# Patient Record
Sex: MALE | Race: WHITE | NOT HISPANIC OR LATINO | Employment: FULL TIME | URBAN - METROPOLITAN AREA
[De-identification: names, ages, dates, MRNs, and addresses within clinical notes are randomized per-mention and may not be internally consistent; named-entity substitution may affect disease eponyms.]

---

## 2017-06-07 ENCOUNTER — RADIANT APPOINTMENT (OUTPATIENT)
Dept: GENERAL RADIOLOGY | Facility: CLINIC | Age: 42
End: 2017-06-07
Attending: NURSE PRACTITIONER
Payer: COMMERCIAL

## 2017-06-07 ENCOUNTER — OFFICE VISIT (OUTPATIENT)
Dept: FAMILY MEDICINE | Facility: CLINIC | Age: 42
End: 2017-06-07
Payer: COMMERCIAL

## 2017-06-07 VITALS
HEART RATE: 72 BPM | TEMPERATURE: 98.3 F | DIASTOLIC BLOOD PRESSURE: 94 MMHG | WEIGHT: 315 LBS | SYSTOLIC BLOOD PRESSURE: 146 MMHG

## 2017-06-07 DIAGNOSIS — M25.562 ACUTE PAIN OF LEFT KNEE: Primary | ICD-10-CM

## 2017-06-07 PROCEDURE — 99203 OFFICE O/P NEW LOW 30 MIN: CPT | Performed by: NURSE PRACTITIONER

## 2017-06-07 PROCEDURE — 73565 X-RAY EXAM OF KNEES: CPT

## 2017-06-07 ASSESSMENT — PAIN SCALES - GENERAL: PAINLEVEL: NO PAIN (1)

## 2017-06-07 NOTE — LETTER
June 7, 2017      Rashaun Camara  [address] [facility] [address]      RE: Rashaun Rodney was seen in clinic today 6/7/2017 for acute injury of knee. He will be out of of work the rest of this week 6/7/2017 - 6/9/2017. May return to work on Monday 6/1212/2017.      Sincerely,    JARETT Collins CNP      Your [facility] Care Team

## 2017-06-07 NOTE — PATIENT INSTRUCTIONS
Will get x-ray today and notify you of any abnormal results    Will ace wrap knee today in office - if this does not offer enough compression/comfort - Get a compression sleeve/brace to see if tighter compression helps comfort    Ice knee as able     Ibuprofen 600 mg three times daily     Stay off of work this week, return to work Monday if you are able     Make appointment to see ortho   Knee Pain of Uncertain Cause    There are several common causes for knee pain. These can include:    A sprain of the ligaments that support the joint    An injury to the cartilage lining of the joint    Arthritis from wear-and-tear or inflammation  There are other causes as well. There may also be swelling, reduced movement of the knee joint, and pain with walking. A definite diagnosis will still need to be made. If your symptoms do not improve, further follow-up and testing may be needed.  Home care    Stay off the injured leg as much as possible until pain improves.    Apply an ice pack over the injured area for 15 to 20 minutes every 3 to 6 hours. You should do this for the first 24 to 48 hours. You can make an ice pack by filling a plastic bag that seals at the top with ice cubes and then wrapping it with a thin towel. Continue to use ice packs for relief of pain and swelling as needed. As the ice melts, be careful to avoid getting your wrap, splint, or cast wet. After 48 hours, apply heat (warm shower or warm bath) for 15 to 20 minutes several times a day, or alternate ice and heat. If you have to wear a hook-and-loop knee brace, you can open it to apply the ice pack, or heat, directly to the knee. Never put ice directly on the skin. Always wrap the ice in a towel or other type of cloth.    You may use over-the-counter pain medicine to control pain, unless another pain medicine was prescribed. If you have chronic liver or kidney disease or ever had a stomach ulcer or GI bleeding, talk with your healthcare provider using these  medicines.    If crutches or a walker have been recommended, do not put weight on the injured leg until you can do so without pain. Check with your healthcare provider before returning to sports or full work duties.    If you have a hook-and-loop knee brace, you can remove it to bathe and sleep, unless told otherwise.  Follow-up care  Follow up with your healthcare provider as advised. This is usually within 1-2 weeks.  If X-rays were taken, you will be told of any new findings that may affect your care.  Call 911  Call 911 if you have:     Shortness of breath    Chest pain  When to seek medical advice  Call your healthcare provider right away if any of these occur:    Toes or foot becomes swollen, cold, blue, numb, or tingly    Pain or swelling spreads over the knee or calf    Warmth or redness appears over the knee or calf    Other joints become painful    Rash appears    Fever of 100.4 F (38 C) or above lasting for 24 to 48 hours    8120-6171 [employer]. [address] [place] [address], [place] [address]. All rights reserved. This information is not intended as a substitute for professional medical care. Always follow your healthcare professional's instructions.

## 2017-06-07 NOTE — MR AVS SNAPSHOT
After Visit Summary   6/7/2017    Rashaun Camara    MRN: [id number]           Patient Information     Date Of Birth          1975        Visit Information        Provider Department      6/7/2017 9:40 AM Talya Bates APRN [facility]        Today's Diagnoses     Acute pain of left knee    -  1      Care Instructions    Will get x-ray today and notify you of any abnormal results    Will ace wrap knee today in office - if this does not offer enough compression/comfort - Get a compression sleeve/brace to see if tighter compression helps comfort    Ice knee as able     Ibuprofen 600 mg three times daily     Stay off of work this week, return to work Monday if you are able     Make appointment to see ortho   Knee Pain of Uncertain Cause    There are several common causes for knee pain. These can include:    A sprain of the ligaments that support the joint    An injury to the cartilage lining of the joint    Arthritis from wear-and-tear or inflammation  There are other causes as well. There may also be swelling, reduced movement of the knee joint, and pain with walking. A definite diagnosis will still need to be made. If your symptoms do not improve, further follow-up and testing may be needed.  Home care    Stay off the injured leg as much as possible until pain improves.    Apply an ice pack over the injured area for 15 to 20 minutes every 3 to 6 hours. You should do this for the first 24 to 48 hours. You can make an ice pack by filling a plastic bag that seals at the top with ice cubes and then wrapping it with a thin towel. Continue to use ice packs for relief of pain and swelling as needed. As the ice melts, be careful to avoid getting your wrap, splint, or cast wet. After 48 hours, apply heat (warm shower or warm bath) for 15 to 20 minutes several times a day, or alternate ice and heat. If you have to wear a hook-and-loop knee brace, you can open it to apply the ice  pack, or heat, directly to the knee. Never put ice directly on the skin. Always wrap the ice in a towel or other type of cloth.    You may use over-the-counter pain medicine to control pain, unless another pain medicine was prescribed. If you have chronic liver or kidney disease or ever had a stomach ulcer or GI bleeding, talk with your healthcare provider using these medicines.    If crutches or a walker have been recommended, do not put weight on the injured leg until you can do so without pain. Check with your healthcare provider before returning to sports or full work duties.    If you have a hook-and-loop knee brace, you can remove it to bathe and sleep, unless told otherwise.  Follow-up care  Follow up with your healthcare provider as advised. This is usually within 1-2 weeks.  If X-rays were taken, you will be told of any new findings that may affect your care.  Call 911  Call 911 if you have:     Shortness of breath    Chest pain  When to seek medical advice  Call your healthcare provider right away if any of these occur:    Toes or foot becomes swollen, cold, blue, numb, or tingly    Pain or swelling spreads over the knee or calf    Warmth or redness appears over the knee or calf    Other joints become painful    Rash appears    Fever of 100.4 F (38 C) or above lasting for 24 to 48 hours    7117-6639 [employer]. [address] [place], [place] [address]. All rights reserved. This information is not intended as a substitute for professional medical care. Always follow your healthcare professional's instructions.                Follow-ups after your visit        Additional Services     ORTHO  REFERRAL       [facility] is referring you to the Orthopedic  Services at [facility].       The  Representative will assist you in the coordination of your Orthopedic and Musculoskeletal Care as prescribed by your physician.    The   "Representative will call you within 1 business day to help schedule your appointment, or you may contact the  Representative at:    All areas ~ [phone]     Type of Referral : Non Surgical       Timeframe requested: Within 2 weeks    Coverage of these services is subject to the terms and limitations of your health insurance plan.  Please call member services at your health plan with any benefit or coverage questions.      If X-rays, CT or MRI's have been performed, please contact the facility where they were done to arrange for , prior to your scheduled appointment.  Please bring this referral request to your appointment and present it to your specialist.                  Who to contact     If you have questions or need follow up information about today's clinic visit or your schedule please contact [facility] directly at [phone].  Normal or non-critical lab and imaging results will be communicated to you by [employer], letter or phone within 4 business days after the clinic has received the results. If you do not hear from us within 7 days, please contact the clinic through [employer] or phone. If you have a critical or abnormal lab result, we will notify you by phone as soon as possible.  Submit refill requests through [employer] or call your pharmacy and they will forward the refill request to us. Please allow 3 business days for your refill to be completed.          Additional Information About Your Visit        [employer] Information     [employer] lets you send messages to your doctor, view your test results, renew your prescriptions, schedule appointments and more. To sign up, go to [email or web address] . Click on \"Log in\" on the left side of the screen, which will take you to the Welcome page. Then click on \"Sign up Now\" on the right side of the page.     You will be asked to enter the access code listed below, as well as some personal information. Please follow the " directions to create your username and password.     Your access code is: [email or web address]  Expires: 2017 10:26 AM     Your access code will  in 90 days. If you need help or a new code, please call your [facility] or [phone].        Care EveryWhere ID     This is your Care EveryWhere ID. This could be used by other organizations to access your [place] medical records  [id number]        Your Vitals Were     Pulse Temperature                72 98.3  F (36.8  C) (Tympanic)           Blood Pressure from Last 3 Encounters:   17 (!) 146/94   09/15/16 132/72    Weight from Last 3 Encounters:   17 (!) 366 lb (166 kg)              We Performed the Following     ORTHO  REFERRAL     XR Knee AP Standing Bilateral        Primary Care Provider    None       No address on file        Thank you!     Thank you for choosing [facility]  for your care. Our goal is always to provide you with excellent care. Hearing back from our patients is one way we can continue to improve our services. Please take a few minutes to complete the written survey that you may receive in the mail after your visit with us. Thank you!             Your Updated Medication List - Protect others around you: Learn how to safely use, store and throw away your medicines at www.disposemymeds.org.          This list is accurate as of: 17 10:26 AM.  Always use your most recent med list.                   Brand Name Dispense Instructions for use    FLONASE NA      Spray in nostril as needed

## 2017-06-07 NOTE — NURSING NOTE
Chief Complaint   Patient presents with     Musculoskeletal Problem     Knee       Initial BP (!) 146/94 (BP Location: Right arm, Patient Position: Chair, Cuff Size: Adult Large)  Pulse 72  Temp 98.3  F (36.8  C) (Tympanic)  Wt (!) 366 lb (166 kg) There is no height or weight on file to calculate BMI.  Medication Reconciliation: complete    Health Maintenance that is potentially due pending provider review:  TD - Believes had within the last 3 years possibly at [facility]    Alison Edwards MA  9:57 AM 6/7/2017  .

## 2017-06-07 NOTE — PROGRESS NOTES
"  SUBJECTIVE:                                                    Rashaun Camara is a 41 year old male who presents to clinic today for the following health issues:      Knee Pain     Onset: x 1 week    Description:   Location: left knee  Character: \"electric\" when moves just right, Dull Ache at times    Intensity: moderate    Progression of Symptoms: worse    Accompanying Signs & Symptoms:  Other symptoms: swelling and spasms and cramps in his calf   History:   Previous similar pain: no       Precipitating factors:   Trauma or overuse: Unknown    Alleviating factors:  Improved by: ice and NSAID - Ibuprofen    Sometimes will buckle on him if he steps wrong     Therapies Tried and outcome: Ibuprofen, Ice - fells some relief      No history of trauma or injury  Slipped and fell when mowing the lawn, slipped in a pot hole prior to all of this, but didn't hurt during/after incidents    Steadily getting worse in the pat week     Morning is okay, stiff and wants to buckle  Walks it out, then by mid-end of day, by about noon starts hurting more  Doesn't want to support him to move, some times has to catch self    Time off it seems to improve    Ibuprofen seems to help, but is resting when taking so not sure if is really helping     At rest doesn't even feel, when tapping on medial aspect of knee is like electric  Spasms of lower calf and upper leg  Waking up with cramps         Problem list and histories reviewed & adjusted, as indicated.  Additional history: as documented    There is no problem list on file for this patient.    History reviewed. No pertinent surgical history.    Social History   Substance Use Topics     Smoking status: Never Smoker     Smokeless tobacco: Not on file     Alcohol use No     History reviewed. No pertinent family history.      Current Outpatient Prescriptions   Medication Sig Dispense Refill     Fluticasone Propionate (FLONASE NA) Spray in nostril as needed       Allergies   Allergen Reactions "     Penicillins Rash       Reviewed and updated as needed this visit by clinical staff  Tobacco  Allergies  Meds  Problems  Med Hx  Surg Hx  Fam Hx  Soc Hx        Reviewed and updated as needed this visit by Provider  Allergies  Meds  Problems         ROS:  Constitutional, HEENT, cardiovascular, pulmonary, gi and gu systems are negative, except as otherwise noted.    OBJECTIVE:                                                    BP (!) 146/94 (BP Location: Right arm, Patient Position: Chair, Cuff Size: Adult Large)  Pulse 72  Temp 98.3  F (36.8  C) (Tympanic)  Wt (!) 366 lb (166 kg)  There is no height or weight on file to calculate BMI.  GENERAL APPEARANCE: healthy, alert and no distress  MS: extremities normal- no gross deformities noted and peripheral pulses normal, antalgic gait favoring left side  ORTHO: Knee Exam Left: Inspection: No effusion  Tender: medial joint line  Non-tender: remainder of knee non-tender  Active Range of Motion: full flexion, pain with extension  Strength: quad  5/5, Hamstrings  5/5, Gastroc  5/5, Tibialis anterior  5/5 and Peroneals  5/5    Also examined: hip full range of motion   NEURO: Normal strength and tone, mentation intact, speech normal and DTR symmetrically normal in lower extremities    Diagnostic Test Results:  Xray - Knees independently read by JARETT Collins CNP - good joint space, no acute fracture      ASSESSMENT/PLAN:                                                      1. Acute pain of left knee  Just started hurting x 1 week, prior to that no known injury or trauma. Patient did slip in yard when mowing and stepped in pot hole, but did not have pain with either at time of incident. Full range of motion with the exception of full extension with pain. No effusion. Xray negative for degeneration or acute process. Concerned for ligament tear would like patient to see ortho for further evaluation  - XR Knee AP Standing Bilateral  - ORTHO   REFERRAL    Patient Instructions   Will get x-ray today and notify you of any abnormal results    Will ace wrap knee today in office - if this does not offer enough compression/comfort - Get a compression sleeve/brace to see if tighter compression helps comfort    Ice knee as able     Ibuprofen 600 mg three times daily     Stay off of work this week, return to work Monday if you are able     Make appointment to see ortho   Knee Pain of Uncertain Cause    There are several common causes for knee pain. These can include:    A sprain of the ligaments that support the joint    An injury to the cartilage lining of the joint    Arthritis from wear-and-tear or inflammation  There are other causes as well. There may also be swelling, reduced movement of the knee joint, and pain with walking. A definite diagnosis will still need to be made. If your symptoms do not improve, further follow-up and testing may be needed.  Home care    Stay off the injured leg as much as possible until pain improves.    Apply an ice pack over the injured area for 15 to 20 minutes every 3 to 6 hours. You should do this for the first 24 to 48 hours. You can make an ice pack by filling a plastic bag that seals at the top with ice cubes and then wrapping it with a thin towel. Continue to use ice packs for relief of pain and swelling as needed. As the ice melts, be careful to avoid getting your wrap, splint, or cast wet. After 48 hours, apply heat (warm shower or warm bath) for 15 to 20 minutes several times a day, or alternate ice and heat. If you have to wear a hook-and-loop knee brace, you can open it to apply the ice pack, or heat, directly to the knee. Never put ice directly on the skin. Always wrap the ice in a towel or other type of cloth.    You may use over-the-counter pain medicine to control pain, unless another pain medicine was prescribed. If you have chronic liver or kidney disease or ever had a stomach ulcer or GI bleeding, talk with  your healthcare provider using these medicines.    If crutches or a walker have been recommended, do not put weight on the injured leg until you can do so without pain. Check with your healthcare provider before returning to sports or full work duties.    If you have a hook-and-loop knee brace, you can remove it to bathe and sleep, unless told otherwise.  Follow-up care  Follow up with your healthcare provider as advised. This is usually within 1-2 weeks.  If X-rays were taken, you will be told of any new findings that may affect your care.  Call 911  Call 911 if you have:     Shortness of breath    Chest pain  When to seek medical advice  Call your healthcare provider right away if any of these occur:    Toes or foot becomes swollen, cold, blue, numb, or tingly    Pain or swelling spreads over the knee or calf    Warmth or redness appears over the knee or calf    Other joints become painful    Rash appears    Fever of 100.4 F (38 C) or above lasting for 24 to 48 hours    7942-3489 [employer] [address] [place] [address]. All rights reserved. This information is not intended as a substitute for professional medical care. Always follow your healthcare professional's instructions.            JARETT Choudhary [facility]

## 2017-06-13 ENCOUNTER — OFFICE VISIT (OUTPATIENT)
Dept: ORTHOPEDICS | Facility: CLINIC | Age: 42
End: 2017-06-13
Payer: COMMERCIAL

## 2017-06-13 VITALS
WEIGHT: 315 LBS | BODY MASS INDEX: 37.19 KG/M2 | HEIGHT: 77 IN | DIASTOLIC BLOOD PRESSURE: 92 MMHG | SYSTOLIC BLOOD PRESSURE: 148 MMHG

## 2017-06-13 DIAGNOSIS — M25.462 EFFUSION OF LEFT KNEE: ICD-10-CM

## 2017-06-13 DIAGNOSIS — M25.562 ACUTE PAIN OF LEFT KNEE: Primary | ICD-10-CM

## 2017-06-13 PROCEDURE — 99243 OFF/OP CNSLTJ NEW/EST LOW 30: CPT | Performed by: FAMILY MEDICINE

## 2017-06-13 NOTE — PROGRESS NOTES
"Rashaun Camara  :  1975  DOS: 2017  MRN: [id number]    Sports Medicine Clinic Visit    PCP: None    Rashaun Camara is a 41 year old male who is seen in consultation at the request of  Talya Bates C.N.P. presenting with acute left knee pain.    Injury: Mowing lawn, slipped landing with left knee bent behind him ~ 2.5 weeks ago, pain started ~ 3 days later and has gradually worsened over time.  Pain located over left deep medial knee, nonradiating.  Additional Features:  Positive: swelling, popping, instability and weakness, severe antalgic gait.  Symptoms are better with Rest and knee brace.  Symptoms are worse with: walking, lying on either side, stairs/step ups, going from sit to stand.  Other evaluation and/or treatments so far consists of: Ice, Ibuprofen, Rest and UC visit, hinged knee brace.  Recent imaging completed: X-rays completed 17.  Prior History of related problems: none  Patient reports that he mild relief after resting from work for ~ 5 days, pain worsened after returning to work yesterday.    Social History: works a  for [employer]    Review of Systems  Musculoskeletal: as above  Remainder of review of systems is negative including constitutional, CV, pulmonary, GI, Skin and Neurologic except as noted in HPI or medical history.    No past medical history on file.  No past surgical history on file.    Objective  BP (!) 148/92  Ht 6' 5\" (1.956 m)  Wt (!) 366 lb (166 kg)  BMI 43.4 kg/m2    General: healthy, alert and in no distress    HEENT: no scleral icterus or conjunctival erythema   Skin: no suspicious lesions or rash. No jaundice.   CV: regular rhythm by palpation, 2+ distal pulses, no pedal edema    Resp: normal respiratory effort without conversational dyspnea   Psych: normal mood and affect    Gait: antalgic, appropriate coordination and balance   Neuro: normal light touch sensory exam of the extremities. Motor strength as noted below "     Left Knee exam    ROM:        Flexion 90 degrees       Extension -6 degrees       Range of motion limited by pain, tightness    Inspection:       no visible ecchymosis        effusion noted small/moderte    Skin:       no visible deformities       well perfused       capillary refill brisk    Patellar Motion:        Normal patellar tracking noted through range of motion       Crepitus noted in the patellofemoral joint    Tender:        lateral patellar border       medial joint line       lateral joint line    Non Tender:         remainder of knee area        medial patellar border        along MCL        distal IT Band        infrapatellar tendon       pes anserine bursa       either hamstring tendon    Special Tests:        positive (+) Noah       neg (-) Lachman       neg (-) anterior drawer       neg (-) posterior drawer       neg (-) varus at 0 deg and 30 deg       neg (-) valgus at 0 deg and 30 deg       positive pain with forced extension    Evaluation of ipsilateral kinetic chain       normal strength with hip extension and abduction       normal strength with single leg squat      Radiology  Results for orders placed or performed in visit on 06/07/17   XR Knee AP Standing Bilateral    Narrative    XR KNEE AP STANDING BILATERAL -  6/7/2017 10:55 AM     HISTORY:  Left knee pain, Pain in left knee.     COMPARISON: None.      Impression    IMPRESSION: Minimal degenerative changes in the right medial and  lateral compartments. Joint spaces of the left knee are maintained. No  fracture or osseous lesion seen on this single view.     ALISE BRADSHAW MD         Assessment:  1. Acute pain of left knee    2. Effusion of left knee        Plan:  Discussed the assessment with the patient.  Follow up: will contact with MR results  Given nature/mechanism of injury and effusion with significant WB pain, will eval with MR to determine if there is a fracture or internal dernagement  Advised protected painfree WB  He  is reticent to take too many restrictions at work  I filled out paperwork for him with limited standing and WB in leg, but will work with him as long as his job duties can be perfored safel for him and others and not worsen underlying injury  RICE reviewed  XR images independently visualized and reviewed with patient today in clinic  Minimal OA changes on imaging, signs of small effusion  Assistive device use revewed  Plan for close f/u, unless there is a derangement which would require ortho consult  Home handouts provided and supportive care reviewed  All questions were answered today  Contact us with additional questions or concerns  Signs and sx of concern reviewed    Thanks very much for sending this nice gentleman to us, I will keep you updated with his progress      Tristan Monroy DO, CAQ  Primary Care Sports Medicine  [facility]               Disclaimer: This note consists of symbols derived from keyboarding, dictation and/or voice recognition software. As a result, there may be errors in the script that have gone undetected. Please consider this when interpreting information found in this chart.

## 2017-06-13 NOTE — MR AVS SNAPSHOT
After Visit Summary   6/13/2017    Rashaun Camara    MRN: [id number]           Patient Information     Date Of Birth          1975        Visit Information        Provider Department      6/13/2017 3:00 PM Tristan Monroy,  [facility]        Today's Diagnoses     Acute pain of left knee    -  1    Effusion of left knee          Care Instructions     Advanced imaging is done by appointment. Some insurance require a prior authorization to be completed which may delay the time until you are able to schedule your appointment. You should be receiving a call from the scheduling department, if you have not heard from them in 24-48 hours.   Please call [facility]: [phone] to schedule your left knee MRI.  Depending on your availability you can usually schedule within the next 1-2 days.    The clinic will call you with results, if you have not heard from the clinic within 3-4 days following your MRI please contact us at the number listed below.                 Follow-ups after your visit        Future tests that were ordered for you today     Open Future Orders        Priority Expected Expires Ordered    MR Knee Left w/o Contrast Routine  6/13/2018 6/13/2017            Who to contact     If you have questions or need follow up information about today's clinic visit or your schedule please contact [facility] directly at [phone].  Normal or non-critical lab and imaging results will be communicated to you by MyChart, letter or phone within 4 business days after the clinic has received the results. If you do not hear from us within 7 days, please contact the clinic through MyChart or phone. If you have a critical or abnormal lab result, we will notify you by phone as soon as possible.  Submit refill requests through [employer] or call your pharmacy and they will forward the refill request to us. Please  "allow 3 business days for your refill to be completed.          Additional Information About Your Visit        MyChart Information     Appwizhart lets you send messages to your doctor, view your test results, renew your prescriptions, schedule appointments and more. To sign up, go to [email or web address] . Click on \"Log in\" on the left side of the screen, which will take you to the Welcome page. Then click on \"Sign up Now\" on the right side of the page.     You will be asked to enter the access code listed below, as well as some personal information. Please follow the directions to create your username and password.     Your access code is: [email or web address]  Expires: 2017 10:26 AM     Your access code will  in 90 days. If you need help or a new code, please call your [place] clinic or [phone].        Care EveryWhere ID     This is your Care EveryWhere ID. This could be used by other organizations to access your [place] medical records  [id number]        Your Vitals Were     Height BMI (Body Mass Index)                6' 5\" (1.956 m) 43.4 kg/m2           Blood Pressure from Last 3 Encounters:   17 (!) 148/92   17 (!) 146/94   09/15/16 132/72    Weight from Last 3 Encounters:   17 (!) 366 lb (166 kg)   17 (!) 366 lb (166 kg)               Primary Care Provider    None       No address on file        Thank you!     Thank you for choosing [facility]  for your care. Our goal is always to provide you with excellent care. Hearing back from our patients is one way we can continue to improve our services. Please take a few minutes to complete the written survey that you may receive in the mail after your visit with us. Thank you!             Your Updated Medication List - Protect others around you: Learn how to safely use, store and throw away your medicines at www.disposemymeds.org.          This list is accurate as of: 17  3:48 PM.  Always use " your most recent med list.                   Brand Name Dispense Instructions for use    FLONASE NA      Spray in nostril as needed

## 2017-06-13 NOTE — NURSING NOTE
"Chief Complaint   Patient presents with     Knee Pain     acute left knee pain > 2 weeks       Initial BP (!) 148/92  Ht 6' 5\" (1.956 m)  Wt (!) 366 lb (166 kg)  BMI 43.4 kg/m2 Estimated body mass index is 43.4 kg/(m^2) as calculated from the following:    Height as of this encounter: 6' 5\" (1.956 m).    Weight as of this encounter: 366 lb (166 kg).  Medication Reconciliation: complete     Joe Torres ATC  "

## 2017-06-13 NOTE — PATIENT INSTRUCTIONS
Advanced imaging is done by appointment. Some insurance require a prior authorization to be completed which may delay the time until you are able to schedule your appointment. You should be receiving a call from the scheduling department, if you have not heard from them in 24-48 hours.   Please call [place] [place] [facility] [place]: [phone] to schedule your left knee MRI.  Depending on your availability you can usually schedule within the next 1-2 days.    The clinic will call you with results, if you have not heard from the clinic within 3-4 days following your MRI please contact us at the number listed below.

## 2017-06-14 ENCOUNTER — HOSPITAL ENCOUNTER (OUTPATIENT)
Dept: MRI IMAGING | Facility: CLINIC | Age: 42
Discharge: HOME | End: 2017-06-14
Attending: FAMILY MEDICINE | Admitting: FAMILY MEDICINE
Payer: COMMERCIAL

## 2017-06-14 DIAGNOSIS — M25.462 EFFUSION OF LEFT KNEE: ICD-10-CM

## 2017-06-14 DIAGNOSIS — M25.562 ACUTE PAIN OF LEFT KNEE: ICD-10-CM

## 2017-06-14 PROCEDURE — 73721 MRI JNT OF LWR EXTRE W/O DYE: CPT | Mod: LT

## 2017-06-15 ENCOUNTER — TELEPHONE (OUTPATIENT)
Dept: ORTHOPEDICS | Facility: CLINIC | Age: 42
End: 2017-06-15

## 2017-06-15 NOTE — TELEPHONE ENCOUNTER
Patient scheduled for f/u appt on 6/22.  Letter emailed to patient as requested.    Joe Torres ATC

## 2017-06-15 NOTE — LETTER
Olivia 15, 2017      Rashaun [facility]  [address] [facility] [address]        I reviewed Rashaun's MRI results with him today.  I am recommending the same restrictions as provided earlier this week for now, but I will follow up with him next week and updated restrictions will be provided at that time.  The most critical issue in managing his recovery will be avoiding pain when he is bearing weight.  I strongly advised him to limit, modify or eliminate any painful weightbearing activity.  All home or work activity that is pain-free is allowed, within the provided restrictions.  Please work with him as much as possible to provide work for him if possible within his restrictions.        Tristan Hanna, , CAQ  Primary Care Sports Medicine  [facility]

## 2017-06-15 NOTE — TELEPHONE ENCOUNTER
Reason for Call:  Request for results:    Name of test or procedure: MRI    Date of test of procedure: 6/14/17    Location of the test or procedure: [place]    OK to leave the result message on voice mail or with a family member? YES    Phone number Patient can be reached at:  Home number on file [phone] (home)    Additional comments: pt calling stating someone called him and he is returning the call for MRI results    Call taken on 6/15/2017 at 9:13 AM by Shara Gómez

## 2017-06-15 NOTE — TELEPHONE ENCOUNTER
Returned phone call to patient and discussed results.  He has a moderate bone contusion/stress reaction on the distal end of his femur.  Would recommend that the limit WB on his left knee over the next 2 weeks.  Discussed his current work restrictions - he requesting that note be addended to allow him 2 - 4 hours of standing through out the day or clarification on total time vs time at one period.  Would also like to discuss bending restriction.  Advised may recommend further time off, although he is eager to continue working d/t limited PTO.  Will discuss with Dr Monroy and return phone call to patient.    Joe Torres ATC

## 2017-06-15 NOTE — TELEPHONE ENCOUNTER
New letter to be written, limit any painful WB over the next 2 weeks as described.  Will f/u with him next Thursday at 9am to revisit, will advance activity as tolerated  Reassuring no internal derangement  Ok to to to work on ROM and pain in a pool if painfree  New letter written and will be emailed to [email or web address]

## 2017-06-22 ENCOUNTER — OFFICE VISIT (OUTPATIENT)
Dept: ORTHOPEDICS | Facility: CLINIC | Age: 42
End: 2017-06-22
Payer: COMMERCIAL

## 2017-06-22 VITALS
DIASTOLIC BLOOD PRESSURE: 85 MMHG | SYSTOLIC BLOOD PRESSURE: 140 MMHG | BODY MASS INDEX: 37.19 KG/M2 | WEIGHT: 315 LBS | HEIGHT: 77 IN

## 2017-06-22 DIAGNOSIS — S89.92XD KNEE JOINT INJURY, LEFT, SUBSEQUENT ENCOUNTER: Primary | ICD-10-CM

## 2017-06-22 DIAGNOSIS — T14.90XA BONE INJURY: ICD-10-CM

## 2017-06-22 DIAGNOSIS — R93.7 BONE MARROW EDEMA: ICD-10-CM

## 2017-06-22 PROCEDURE — 99213 OFFICE O/P EST LOW 20 MIN: CPT | Performed by: FAMILY MEDICINE

## 2017-06-22 NOTE — PROGRESS NOTES
"Rashaun Camara  :  1975  DOS: 2017  MRN: [id number]    Sports Medicine Clinic Visit    PCP: None    Rashaun Camara is a 41 year old male who is seen in consultation at the request of  Talya Bates C.N.P. presenting with acute left knee pain.    Injury: Mowing lawn, slipped landing with left knee bent behind him ~ 2.5 weeks ago, pain started ~ 3 days later and has gradually worsened over time.  Pain located over left deep medial knee, nonradiating.  Additional Features:  Positive: swelling, popping, instability and weakness, severe antalgic gait.  Symptoms are better with Rest and knee brace.  Symptoms are worse with: walking, lying on either side, stairs/step ups, going from sit to stand.  Other evaluation and/or treatments so far consists of: Ice, Ibuprofen, Rest and UC visit, hinged knee brace.  Recent imaging completed: X-rays completed 17.  Prior History of related problems: none  Patient reports that he mild relief after resting from work for ~ 5 days, pain worsened after returning to work yesterday.    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  Reports that his pain has waxed and waned over the last 1 week.  Walking with moderate antalgic gait today after tripping over a bottle hans earlier this AM.  No interim injury.         Review of Systems  Musculoskeletal: as above  Remainder of review of systems is negative including constitutional, CV, pulmonary, GI, Skin and Neurologic except as noted in HPI or medical history.    No past medical history on file.  No past surgical history on file.    Objective  /85  Ht 6' 5\" (1.956 m)  Wt (!) 366 lb (166 kg)  BMI 43.4 kg/m2    General: healthy, alert and in no distress    HEENT: no scleral icterus or conjunctival erythema   Skin: no suspicious lesions or rash. No jaundice.   CV: regular rhythm by palpation, " 2+ distal pulses, no pedal edema    Resp: normal respiratory effort without conversational dyspnea   Psych: normal mood and affect    Gait: antalgic, appropriate coordination and balance   Neuro: normal light touch sensory exam of the extremities. Motor strength as noted below     Left Knee exam    ROM:        Flexion 130 degrees       Extension -2 degrees       Range of motion limited by mild pain in terminal flexion    Inspection:       no visible ecchymosis        effusion noted trace    Skin:       no visible deformities       well perfused       capillary refill brisk    Patellar Motion:        Normal patellar tracking noted through range of motion    Tender:        lateral patellar border mild       medial joint line and medial femoral condyle still focal       lateral joint line mild    Non Tender:         remainder of knee area        medial patellar border        along MCL        distal IT Band        infrapatellar tendon       pes anserine bursa       either hamstring tendon    Special Tests:        neg (-) varus at 0 deg and 30 deg       neg (-) valgus at 0 deg and 30 deg       positive pain with forced extension    Evaluation of ipsilateral kinetic chain       normal strength with hip extension and abduction      Radiology  Results for orders placed or performed in visit on 06/07/17   XR Knee AP Standing Bilateral    Narrative    XR KNEE AP STANDING BILATERAL -  6/7/2017 10:55 AM     HISTORY:  Left knee pain, Pain in left knee.     COMPARISON: None.      Impression    IMPRESSION: Minimal degenerative changes in the right medial and  lateral compartments. Joint spaces of the left knee are maintained. No  fracture or osseous lesion seen on this single view.     ALISE BRADSHAW MD         Assessment:  1. Knee joint injury, left, subsequent encounter    2. Bone injury    3. Bone marrow edema        Plan:  Discussed the assessment with the patient.  Follow up: 3 weeks  Continue protected painfree WB  Was  improving to pain-free WB except stairs, somewhat flared last 2 days due to increased activity  Letter updated today, can amend any time  He will call with update next week  Employer's position is that he likely will not be able to return until he has no restrictions  RICE reviewed, assistive device use reviewed including crutch/es and cane prn  Reassuring no internal derangement, so should heal completely with time and conservative care  Supportive care reviewed  All questions were answered today  Contact us with additional questions or concerns  Signs and sx of concern reviewed      Tristan Monroy DO, ASHLYN  Primary Care Sports Medicine  [facility]               Disclaimer: This note consists of symbols derived from keyboarding, dictation and/or voice recognition software. As a result, there may be errors in the script that have gone undetected. Please consider this when interpreting information found in this chart.

## 2017-06-22 NOTE — MR AVS SNAPSHOT
"              After Visit Summary   2017    Rashaun Camara    MRN: [id number]           Patient Information     Date Of Birth          1975        Visit Information        Provider Department      2017 9:00 AM Tristan Monroy,  [facility]        Today's Diagnoses     Knee joint injury, left, subsequent encounter    -  1    Bone injury        Bone marrow edema           Follow-ups after your visit        Who to contact     If you have questions or need follow up information about today's clinic visit or your schedule please contact [facility] directly at [phone].  Normal or non-critical lab and imaging results will be communicated to you by [employer], letter or phone within 4 business days after the clinic has received the results. If you do not hear from us within 7 days, please contact the clinic through [employer] or phone. If you have a critical or abnormal lab result, we will notify you by phone as soon as possible.  Submit refill requests through [employer] or call your pharmacy and they will forward the refill request to us. Please allow 3 business days for your refill to be completed.          Additional Information About Your Visit        MyChart Information     [employer] lets you send messages to your doctor, view your test results, renew your prescriptions, schedule appointments and more. To sign up, go to [email or web address] . Click on \"Log in\" on the left side of the screen, which will take you to the Welcome page. Then click on \"Sign up Now\" on the right side of the page.     You will be asked to enter the access code listed below, as well as some personal information. Please follow the directions to create your username and password.     Your access code is: [email or web address]  Expires: 2017 10:26 AM     Your access code will  in 90 days. If you need help or a new code, please call your [place] clinic or " "[phone].        Care EveryWhere ID     This is your Care EveryWhere ID. This could be used by other organizations to access your [place] medical records  [id number]        Your Vitals Were     Height BMI (Body Mass Index)                6' 5\" (1.956 m) 43.4 kg/m2           Blood Pressure from Last 3 Encounters:   06/22/17 140/85   06/13/17 (!) 148/92   06/07/17 (!) 146/94    Weight from Last 3 Encounters:   06/22/17 (!) 366 lb (166 kg)   06/13/17 (!) 366 lb (166 kg)   06/07/17 (!) 366 lb (166 kg)              Today, you had the following     No orders found for display       Primary Care Provider    None       No address on file        Equal Access to Services     WENDI CHAMBERLAIN : Hadii radhames Christianson, waaxda luqadaha, qaybta kaalmada adeshelbyyaivanna, keila martin . So [facility] [phone].    ATENCIÓN: Si habla español, tiene a dillon disposición servicios gratuitos de asistencia lingüística. Llame al [phone].    We comply with applicable federal civil rights laws and [place] laws. We do not discriminate on the basis of race, color, national origin, age, disability sex, sexual orientation or gender identity.            Thank you!     Thank you for choosing [place] [facility]  for your care. Our goal is always to provide you with excellent care. Hearing back from our patients is one way we can continue to improve our services. Please take a few minutes to complete the written survey that you may receive in the mail after your visit with us. Thank you!             Your Updated Medication List - Protect others around you: Learn how to safely use, store and throw away your medicines at www.disposemymeds.org.          This list is accurate as of: 6/22/17 12:02 PM.  Always use your most recent med list.                   Brand Name Dispense Instructions for use Diagnosis    FLONASE NA      Spray in nostril as needed          "

## 2017-06-22 NOTE — NURSING NOTE
"Chief Complaint   Patient presents with     Knee Pain     f/u left knee injury > 3 weeks       Initial /85  Ht 6' 5\" (1.956 m)  Wt (!) 366 lb (166 kg)  BMI 43.4 kg/m2 Estimated body mass index is 43.4 kg/(m^2) as calculated from the following:    Height as of this encounter: 6' 5\" (1.956 m).    Weight as of this encounter: 366 lb (166 kg).  Medication Reconciliation: complete     Joe Torres ATC  "

## 2017-06-22 NOTE — LETTER
June 22, 2017      Rashaun [facility]  [address] [facility] [address]        I reviewed Rashaun's MRI results with him again today.  I am recommending the same restrictions for now.  I will follow up with him in three weeks and updated restrictions will be provided at that time.  I will update restrictions weekly as needed based on his progress, however, as he is anxious to return to work.  The most critical issue in managing his recovery will be avoiding pain when he is bearing weight.  I strongly advised him to limit, modify or eliminate any painful weightbearing activity.  All home or work activity that is pain-free is allowed, within the provided restrictions.  Please work with him as much as possible to provide work for him if possible within his restrictions.        Tristan Hanna, DO, CAQ  Primary Care Sports Medicine  [facility]

## 2017-06-30 ENCOUNTER — TELEPHONE (OUTPATIENT)
Dept: ORTHOPEDICS | Facility: CLINIC | Age: 42
End: 2017-06-30

## 2017-06-30 NOTE — TELEPHONE ENCOUNTER
Reason for Call:  Other     Detailed comments: Pt instructed to call in once a week with an update: He has an appt in 2 weeks.     He is having issues with weight bearing with standing - there is pain in the knee. If he tries to walk with the knee straight it is painful. Before he could walk and not do stairs, but now he can do stairs and not straight walking. He feels if the knee is bent it feels more stable. He is using 1 crutch.     Phone Number Patient can be reached at: Home number on file [phone] (home)    Best Time: Any    Can we leave a detailed message on this number? YES    Call taken on 6/30/2017 at 9:35 AM by Denise Behrendt

## 2017-07-11 ENCOUNTER — OFFICE VISIT (OUTPATIENT)
Dept: ORTHOPEDICS | Facility: CLINIC | Age: 42
End: 2017-07-11
Payer: COMMERCIAL

## 2017-07-11 VITALS
BODY MASS INDEX: 37.19 KG/M2 | WEIGHT: 315 LBS | DIASTOLIC BLOOD PRESSURE: 88 MMHG | HEIGHT: 77 IN | SYSTOLIC BLOOD PRESSURE: 135 MMHG

## 2017-07-11 DIAGNOSIS — T14.90XA BONE INJURY: ICD-10-CM

## 2017-07-11 DIAGNOSIS — R93.7 BONE MARROW EDEMA: ICD-10-CM

## 2017-07-11 DIAGNOSIS — S89.92XD KNEE JOINT INJURY, LEFT, SUBSEQUENT ENCOUNTER: Primary | ICD-10-CM

## 2017-07-11 PROCEDURE — 99213 OFFICE O/P EST LOW 20 MIN: CPT | Performed by: FAMILY MEDICINE

## 2017-07-11 NOTE — PROGRESS NOTES
"Rashaun Camara  :  1975  DOS: 2017  MRN: [id number]    Sports Medicine Clinic Visit    PCP: None    Rashaun Camara is a 41 year old male who is seen in consultation at the request of  Talya Bates C.N.P. presenting with acute left knee pain.    Injury: Mowing lawn, slipped landing with left knee bent behind him ~ 2.5 weeks ago, pain started ~ 3 days later and has gradually worsened over time.  Pain located over left deep medial knee, nonradiating.  Additional Features:  Positive: swelling, popping, instability and weakness, severe antalgic gait.  Symptoms are better with Rest and knee brace.  Symptoms are worse with: walking, lying on either side, stairs/step ups, going from sit to stand.  Other evaluation and/or treatments so far consists of: Ice, Ibuprofen, Rest and UC visit, hinged knee brace.  Recent imaging completed: X-rays completed 17.  Prior History of related problems: none  Patient reports that he mild relief after resting from work for ~ 5 days, pain worsened after returning to work yesterday.    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  Reports that his pain has waxed and waned over the last 1 week.  Walking with moderate antalgic gait today after tripping over a bottle hans earlier this AM.  No interim injury.       Interim History - 2017  Since last visit on 2017 patient has minimal improvement in left knee pain.  Reports that he is now able to use stairs and walk with knee with minimal discomfort.  He is lacking terminal knee extension and reports that his knee feels \"weak\".  He has continued to be off work, since his employer is unable to work within his restrictions.  No interim injury.       Review of Systems  Musculoskeletal: as above  Remainder of review of systems is negative including constitutional, CV, pulmonary, GI, Skin " "and Neurologic except as noted in HPI or medical history.    No past medical history on file.  No past surgical history on file.    Objective  /88  Ht 6' 5\" (1.956 m)  Wt (!) 366 lb (166 kg)  BMI 43.4 kg/m2    General: healthy, alert and in no distress    HEENT: no scleral icterus or conjunctival erythema   Skin: no suspicious lesions or rash. No jaundice.   CV: regular rhythm by palpation, 2+ distal pulses, no pedal edema    Resp: normal respiratory effort without conversational dyspnea   Psych: normal mood and affect    Gait: antalgic bearing wt especially with full extension and WB, appropriate coordination and balance   Neuro: normal light touch sensory exam of the extremities. Motor strength as noted below     Left Knee exam    ROM:        Flexion 130 degrees       Extension -2 degrees       Range of motion limited by mild pain in terminal flexion    Inspection:       no visible ecchymosis        effusion noted trace    Skin:       no visible deformities       well perfused       capillary refill brisk    Patellar Motion:        Normal patellar tracking noted through range of motion    Tender:        lateral patellar border mild       medial joint line and medial femoral condyle still focal       lateral joint line mild    Non Tender:         remainder of knee area        medial patellar border        along MCL        distal IT Band        infrapatellar tendon       pes anserine bursa       either hamstring tendon    Special Tests:        neg (-) varus at 0 deg and 30 deg       neg (-) valgus at 0 deg and 30 deg       positive pain with forced extension    Evaluation of ipsilateral kinetic chain       normal strength with hip extension and abduction      Radiology  Results for orders placed or performed in visit on 06/07/17   XR Knee AP Standing Bilateral    Narrative    XR KNEE AP STANDING BILATERAL -  6/7/2017 10:55 AM     HISTORY:  Left knee pain, Pain in left knee.     COMPARISON: None.      " "Impression    IMPRESSION: Minimal degenerative changes in the right medial and  lateral compartments. Joint spaces of the left knee are maintained. No  fracture or osseous lesion seen on this single view.     ALISE BRADSHAW MD         Assessment:  1. Knee joint injury, left, subsequent encounter    2. Bone injury    3. Bone marrow edema        Plan:  Discussed the assessment with the patient.  Follow up: 4 weeks  Continue protected painfree WB  Employer's position is that he likely will not be able to return until he has no restrictions  RICE reviewed, assistive device use reviewed including crutch/es and cane prn  Reassuring no internal derangement, so should heal completely with time and conservative care  Letter updated for work:  \"I saw Rashaun again today in my clinic.  He will follow up with me in 4 weeks and updated restrictions will be provided at that time, or sooner if he is improved.  I will update restrictions weekly if needed based on his progress.  The most critical issue in managing his recovery will be avoiding pain when he is bearing weight.  I strongly advised him to limit, modify or eliminate any painful weightbearing activity.  All home or work activity that is pain-free is allowed.  Please work with him as much as possible to provide work for him if possible.\"  Supportive care reviewed  All questions were answered today  Contact us with additional questions or concerns  Signs and sx of concern reviewed      Tristan Monroy DO, ASHLYN  Primary Care Sports Medicine  [facility]               Disclaimer: This note consists of symbols derived from keyboarding, dictation and/or voice recognition software. As a result, there may be errors in the script that have gone undetected. Please consider this when interpreting information found in this chart.  "

## 2017-07-11 NOTE — NURSING NOTE
"Chief Complaint   Patient presents with     Knee Pain     f/u left knee pain > 4 weeks       Initial /88  Ht 6' 5\" (1.956 m)  Wt (!) 366 lb (166 kg)  BMI 43.4 kg/m2 Estimated body mass index is 43.4 kg/(m^2) as calculated from the following:    Height as of this encounter: 6' 5\" (1.956 m).    Weight as of this encounter: 366 lb (166 kg).  Medication Reconciliation: complete     Joe Torres ATC  "

## 2017-07-11 NOTE — MR AVS SNAPSHOT
"              After Visit Summary   7/11/2017    Rashaun Camara    MRN: [id number]           Patient Information     Date Of Birth          1975        Visit Information        Provider Department      7/11/2017 2:40 PM Tristan Monroy, DO [facility]        Today's Diagnoses     Knee joint injury, left, subsequent encounter    -  1    Bone injury        Bone marrow edema          Care Instructions    Schedule physical therapy with [facility] physical therapy, [phone].  Call with updated in ~ 2 weeks.  Work restrictions provided today, can be updated at any time if improving significantly.          Follow-ups after your visit        Additional Services     PHYSICAL THERAPY REFERRAL       *This therapy referral will be filtered to a centralized scheduling office at [facility] and the patient will receive a call to schedule an appointment at a [facility] location most convenient for them. *     [facility] provides Physical Therapy evaluation and treatment and many specialty services across the [facility] system.  If requesting a specialty program, please choose from the list below.    If you have not heard from the scheduling office within 2 business days, please call [phone] for all locations, with the exception of [place], please call [phone].  Treatment: Evaluation & Treatment  Special Instructions/Modalities: per therapist evaluation  Special Programs: None    Please be aware that coverage of these services is subject to the terms and limitations of your health insurance plan.  Call member services at your health plan with any benefit or coverage questions.      **Note to Provider:  If you are referring outside of [facility] for the therapy appointment, please list the name of the location in the \"special instructions\" above, print the referral and give to the patient to schedule the appointment.                  Who " "to contact     If you have questions or need follow up information about today's clinic visit or your schedule please contact [facility] directly at [phone].  Normal or non-critical lab and imaging results will be communicated to you by MyChart, letter or phone within 4 business days after the clinic has received the results. If you do not hear from us within 7 days, please contact the clinic through MyChart or phone. If you have a critical or abnormal lab result, we will notify you by phone as soon as possible.  Submit refill requests through [employer] or call your pharmacy and they will forward the refill request to us. Please allow 3 business days for your refill to be completed.          Additional Information About Your Visit        [facility] Information     [employer] lets you send messages to your doctor, view your test results, renew your prescriptions, schedule appointments and more. To sign up, go to [email or web address] . Click on \"Log in\" on the left side of the screen, which will take you to the Welcome page. Then click on \"Sign up Now\" on the right side of the page.     You will be asked to enter the access code listed below, as well as some personal information. Please follow the directions to create your username and password.     Your access code is: [email or web address]  Expires: 2017 10:26 AM     Your access code will  in 90 days. If you need help or a new code, please call your [facility] clinic or [phone].        Care EveryWhere ID     This is your Care EveryWhere ID. This could be used by other organizations to access your [facility] medical records  [id number]        Your Vitals Were     Height BMI (Body Mass Index)                6' 5\" (1.956 m) 43.4 kg/m2           Blood Pressure from Last 3 Encounters:   17 135/88   17 140/85   17 (!) 148/92    Weight from Last 3 Encounters:   17 (!) 366 lb (166 kg)   17 (!) 366 lb (166 " kg)   06/13/17 (!) 366 lb (166 kg)              We Performed the Following     PHYSICAL THERAPY REFERRAL        Primary Care Provider    None       No address on file        Equal Access to Services     WENDI CHAMBERLAIN : Hadii aad ku hadnohemisalina Christianson, ricardaivanna garcianicoleha, nishi sosalaura melendrez, keila smileyelida dinora. So [facility] [phone].    ATENCIÓN: Si habla español, tiene a dillon disposición servicios gratuitos de asistencia lingüística. Llame al [phone].    We comply with applicable federal civil rights laws and [place] laws. We do not discriminate on the basis of race, color, national origin, age, disability sex, sexual orientation or gender identity.            Thank you!     Thank you for choosing [facility]  for your care. Our goal is always to provide you with excellent care. Hearing back from our patients is one way we can continue to improve our services. Please take a few minutes to complete the written survey that you may receive in the mail after your visit with us. Thank you!             Your Updated Medication List - Protect others around you: Learn how to safely use, store and throw away your medicines at www.disposemymeds.org.          This list is accurate as of: 7/11/17  3:22 PM.  Always use your most recent med list.                   Brand Name Dispense Instructions for use Diagnosis    FLONASE NA      Spray in nostril as needed

## 2017-07-11 NOTE — PATIENT INSTRUCTIONS
Schedule physical therapy with [facility] physical therapy, [phone].  Call with updated in ~ 2 weeks.  Work restrictions provided today, can be updated at any time if improving significantly.

## 2017-07-11 NOTE — LETTER
July 11, 2017      Rashaun [facility]  [address] [facility] [address]        I saw Rashaun again today in my clinic.  He will follow up with me in 4 weeks and updated restrictions will be provided at that time, or sooner if he is improved.  I will update restrictions weekly if needed based on his progress.  The most critical issue in managing his recovery will be avoiding pain when he is bearing weight.  I strongly advised him to limit, modify or eliminate any painful weightbearing activity.  All home or work activity that is pain-free is allowed.  Please work with him as much as possible to provide work for him if possible.        Tristan Hanna, , CAQ  Primary Care Sports Medicine  [facility]

## 2017-07-19 ENCOUNTER — HOSPITAL ENCOUNTER (OUTPATIENT)
Dept: PHYSICAL THERAPY | Facility: CLINIC | Age: 42
Setting detail: THERAPIES SERIES
End: 2017-07-19
Attending: FAMILY MEDICINE
Payer: COMMERCIAL

## 2017-07-19 PROCEDURE — 97161 PT EVAL LOW COMPLEX 20 MIN: CPT | Mod: GP | Performed by: PHYSICAL THERAPIST

## 2017-07-19 PROCEDURE — 97110 THERAPEUTIC EXERCISES: CPT | Mod: GP | Performed by: PHYSICAL THERAPIST

## 2017-07-19 PROCEDURE — 97112 NEUROMUSCULAR REEDUCATION: CPT | Mod: GP | Performed by: PHYSICAL THERAPIST

## 2017-07-19 PROCEDURE — 40000718 ZZHC STATISTIC PT DEPARTMENT ORTHO VISIT: Performed by: PHYSICAL THERAPIST

## 2017-07-19 NOTE — PROGRESS NOTES
PHYSICAL THERAPY INITIAL EVALUATION  07/19/17 1100   General Information   Type of Visit Initial OP Ortho PT Evaluation   Start of Care Date 07/19/17   Referring Physician Dr. Monroy   Patient/Family Goals Statement get back to work, mow lawn   Orders Evaluate and Treat   Date of Order 07/11/17   Insurance Type [employer]   Insurance Comments/Visits Authorized no limits   Medical Diagnosis knee joint, bone injury, bone marrow edema   Surgical/Medical history reviewed Yes   Precautions/Limitations no known precautions/limitations   Body Part(s)   Body Part(s) Knee   Presentation and Etiology   Pertinent history of current problem (include personal factors and/or comorbidities that impact the POC) Patient reports that he was mowing lawn, right leg slipped out and he landed on the left knee with it bent. Was told that he almost fractured it. 3 weeks into recovery, slipped on a bottle and aggravated it. Will occasionally feel like it is going to go out. Calf burns when standing on the leg. Needs to be 100% to return to work.     Impairments A. Pain;E. Decreased flexibility;G. Impaired balance;H. Impaired gait   Functional Limitations perform activities of daily living;perform required work activities;perform desired leisure / sports activities   Symptom Location L medial knee   How/Where did it occur Other  (mowing lawn)   Onset date of current episode/exacerbation 06/07/17   Chronicity New   Pain rating (0-10 point scale) Best (/10);Worst (/10)   Best (/10) 0   Worst (/10) 8   Pain quality B. Dull;C. Aching   Frequency of pain/symptoms D. Other   Pain frequency comment certain positions    Pain/symptoms exacerbated by B. Walking;G. Certain positions   Pain/symptoms eased by B. Walking;F. Certain positions   Progression of symptoms since onset: Improved   Prior Level of Function   Prior Level of Function-Mobility independent   Prior Level of Function-ADLs independent   Current Level of Function   Patient role/employment  history A. 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 No   Per patient - Fall with injury in past year? No   Is patient a fall risk? No   Functional Scales   Functional Scales Other   Other Scales  LEFS: 52/80   Knee Objective Findings   Side (if bilateral, select both right and left) Right;Left   Gait/Locomotion antalgic, mid foot heel strike, slighty circumducts left during swing, knee flexed at intiail contact    Foot Position In Standing pes planus   Anterior Drawer Test -   Posterior Drawer Test -   Varus Stress Test -   Valgus Stress Test -   Noah's Test -   Apprehension Test -   Palpation tender medial femoral condyle   Right Knee Extension AROM 0   Right Knee Extension PROM 3 hyperextension   Right Knee Flexion PROM 135   Right Knee Flexion Strength 5/5   Right Knee Extension Strength 5/5   Right Hip Abduction Strength 5/5   Right Quad Set Strength good   R VMO Strength good   Right Gastrocnemius Flexibility mild tight   Right Hamstring Flexibility mod tight   Right Hip Flexor Flexibility WNL   Right Quadricep Flexibility mild tight   Right ITB Flexibility mild tight   Left Knee Extension AROM lacking 2 degrees full extension    Left Knee Flexion AROM 135   Left Knee Flexion Strength 5/5   Left Knee Extension Strength 5-/5   Left Hip Abduction Strength 4+/5   Left Quad Set Strength fair   L VMO Strength fair   Left Gastrocnemius Flexibility mod tight   Left Hamstring Flexibility mod tight   Left Hip Flexor Flexibility WNL   Left Quadricep Flexibility mild tight   Left ITB Flexibility mild tight   Planned Therapy Interventions   Planned Therapy Interventions gait training;balance training;joint mobilization;manual therapy;neuromuscular re-education;ROM;strengthening;stretching   Clinical Impression   Criteria  for Skilled Therapeutic Interventions Met yes, treatment indicated   PT Diagnosis L knee pain, quad weakness, decreased knee ROM   Influenced by the following impairments pain, weakness, decreased flexibility, impaired gait, impaired balance    Functional limitations due to impairments walking, stairs, squatting, lifting   Clinical Presentation Stable/Uncomplicated   Clinical Presentation Rationale symptoms improving   Clinical Decision Making (Complexity) Low complexity   Therapy Frequency 2 times/Week   Predicted Duration of Therapy Intervention (days/wks) 2-3 weeks, 1x/week for 3 weeks   Risk & Benefits of therapy have been explained Yes   Patient, Family & other staff in agreement with plan of care Yes   Clinical Impression Comments Patient presenting with poor quad activation, decreased knee extension ROM and medial knee pain.   Education Assessment   Preferred Learning Style Listening;Demonstration;Pictures/video   Barriers to Learning No barriers   ORTHO GOALS   PT Ortho Eval Goals 1;2;3;4   Ortho Goal 1   Goal Identifier 1   Goal Description Patient will be able to walk 200 ft without a limp or pain, demonstrating normal gait pattern.    Target Date 08/09/17   Ortho Goal 2   Goal Identifier 2   Goal Description Patient will be able to ascend/descend stairs, no rail, demonstrating normal gait mechanics and minimal pain.   Target Date 08/30/17   Ortho Goal 3   Goal Identifier 3   Goal Description Patient will to walk 1 mile with minimal pain.   Target Date 08/30/17   Ortho Goal 4   Goal Identifier 4   Goal Description Patient will be able to return to work with pain 2/10 or less at end of day.   Target Date 08/30/17   Total Evaluation Time   Total Evaluation Time 30       Please contact me with any questions or concerns.  Thank you for your referral.    Gloria Puckett, PT, DPT, OCS  Physical Therapist, Orthopedic Certified Specialist  [facility]  [phone]

## 2017-07-24 ENCOUNTER — HOSPITAL ENCOUNTER (OUTPATIENT)
Dept: PHYSICAL THERAPY | Facility: CLINIC | Age: 42
Setting detail: THERAPIES SERIES
End: 2017-07-24
Attending: FAMILY MEDICINE
Payer: COMMERCIAL

## 2017-07-24 PROCEDURE — 97112 NEUROMUSCULAR REEDUCATION: CPT | Mod: GP | Performed by: PHYSICAL THERAPIST

## 2017-07-24 PROCEDURE — 97110 THERAPEUTIC EXERCISES: CPT | Mod: GP | Performed by: PHYSICAL THERAPIST

## 2017-07-24 PROCEDURE — 40000718 ZZHC STATISTIC PT DEPARTMENT ORTHO VISIT: Performed by: PHYSICAL THERAPIST

## 2017-07-31 ENCOUNTER — HOSPITAL ENCOUNTER (OUTPATIENT)
Dept: PHYSICAL THERAPY | Facility: CLINIC | Age: 42
Setting detail: THERAPIES SERIES
End: 2017-07-31
Attending: FAMILY MEDICINE
Payer: COMMERCIAL

## 2017-07-31 PROCEDURE — 97110 THERAPEUTIC EXERCISES: CPT | Mod: GP | Performed by: PHYSICAL THERAPIST

## 2017-07-31 PROCEDURE — 40000718 ZZHC STATISTIC PT DEPARTMENT ORTHO VISIT: Performed by: PHYSICAL THERAPIST

## 2017-08-02 ENCOUNTER — HOSPITAL ENCOUNTER (OUTPATIENT)
Dept: PHYSICAL THERAPY | Facility: CLINIC | Age: 42
Setting detail: THERAPIES SERIES
End: 2017-08-02
Attending: FAMILY MEDICINE
Payer: COMMERCIAL

## 2017-08-02 PROCEDURE — 97110 THERAPEUTIC EXERCISES: CPT | Mod: GP | Performed by: PHYSICAL THERAPIST

## 2017-08-02 PROCEDURE — 40000718 ZZHC STATISTIC PT DEPARTMENT ORTHO VISIT: Performed by: PHYSICAL THERAPIST

## 2017-08-04 ENCOUNTER — OFFICE VISIT (OUTPATIENT)
Dept: ORTHOPEDICS | Facility: CLINIC | Age: 42
End: 2017-08-04
Payer: COMMERCIAL

## 2017-08-04 VITALS
WEIGHT: 315 LBS | DIASTOLIC BLOOD PRESSURE: 85 MMHG | SYSTOLIC BLOOD PRESSURE: 130 MMHG | HEIGHT: 77 IN | BODY MASS INDEX: 37.19 KG/M2

## 2017-08-04 DIAGNOSIS — S89.92XD KNEE JOINT INJURY, LEFT, SUBSEQUENT ENCOUNTER: Primary | ICD-10-CM

## 2017-08-04 DIAGNOSIS — R93.7 BONE MARROW EDEMA: ICD-10-CM

## 2017-08-04 PROCEDURE — 99213 OFFICE O/P EST LOW 20 MIN: CPT | Performed by: FAMILY MEDICINE

## 2017-08-04 NOTE — PROGRESS NOTES
"Rashaun Camara  :  1975  DOS: 2017  MRN: [id number]    Sports Medicine Clinic Visit    PCP: None    Rashaun Camara is a 41 year old male who is seen in consultation at the request of  Talya Bates C.N.P. presenting with acute left knee pain.    Injury: Mowing lawn, slipped landing with left knee bent behind him ~ 2.5 weeks ago, pain started ~ 3 days later and has gradually worsened over time.  Pain located over left deep medial knee, nonradiating.  Additional Features:  Positive: swelling, popping, instability and weakness, severe antalgic gait.  Symptoms are better with Rest and knee brace.  Symptoms are worse with: walking, lying on either side, stairs/step ups, going from sit to stand.  Other evaluation and/or treatments so far consists of: Ice, Ibuprofen, Rest and UC visit, hinged knee brace.  Recent imaging completed: X-rays completed 17.  Prior History of related problems: none  Patient reports that he mild relief after resting from work for ~ 5 days, pain worsened after returning to work yesterday.    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  Reports that his pain has waxed and waned over the last 1 week.  Walking with moderate antalgic gait today after tripping over a bottle hans earlier this AM.  No interim injury.       Interim History - 2017  Since last visit on 2017 patient has minimal improvement in left knee pain.  Reports that he is now able to use stairs and walk with knee with minimal discomfort.  He is lacking terminal knee extension and reports that his knee feels \"weak\".  He has continued to be off work, since his employer is unable to work within his restrictions.  No interim injury.       Interim History - 2017  Since last visit on 17 patient has good overall improvement in left knee pain.  His walking has been " "greatly improved.  He is eager to return to work, although notes his employer has questions about him being able to return with restrictions.  No interim injury.       Review of Systems  Musculoskeletal: as above  Remainder of review of systems is negative including constitutional, CV, pulmonary, GI, Skin and Neurologic except as noted in HPI or medical history.    No past medical history on file.  No past surgical history on file.    Objective  /85  Ht 6' 5\" (1.956 m)  Wt (!) 366 lb (166 kg)  BMI 43.4 kg/m2    General: healthy, alert and in no distress    HEENT: no scleral icterus or conjunctival erythema   Skin: no suspicious lesions or rash. No jaundice.   CV: regular rhythm by palpation, 2+ distal pulses, no pedal edema    Resp: normal respiratory effort without conversational dyspnea   Psych: normal mood and affect    Gait: antalgic bearing wt especially with full extension and WB, appropriate coordination and balance   Neuro: normal light touch sensory exam of the extremities. Motor strength as noted below     Left Knee exam    ROM:        Flexion 140 degrees, symmetric       Extension -2 degrees, 0 deg on R       Range of motion not limited by pain    Inspection:       no visible ecchymosis        effusion noted trace    Skin:       no visible deformities       well perfused       capillary refill brisk    Patellar Motion:        Normal patellar tracking noted through range of motion    Tender:        lateral patellar border resolved       medial joint line and medial femoral condyle resolved/minimal       lateral joint line resolved    Non Tender:         remainder of knee area        medial patellar border        along MCL        distal IT Band        infrapatellar tendon       pes anserine bursa       either hamstring tendon    Special Tests:        neg (-) varus at 0 deg and 30 deg       neg (-) valgus at 0 deg and 30 deg       Neg pain with forced extension    Evaluation of ipsilateral kinetic " chain       normal strength with hip extension and abduction    Radiology  Results for orders placed or performed in visit on 06/07/17   XR Knee AP Standing Bilateral    Narrative    XR KNEE AP STANDING BILATERAL -  6/7/2017 10:55 AM     HISTORY:  Left knee pain, Pain in left knee.     COMPARISON: None.      Impression    IMPRESSION: Minimal degenerative changes in the right medial and  lateral compartments. Joint spaces of the left knee are maintained. No  fracture or osseous lesion seen on this single view.     ALISE BRADSHAW MD       Assessment:  1. Knee joint injury, left, subsequent encounter    2. Bone marrow edema        Plan:  Discussed the assessment with the patient.  Follow up: prn  Employer's position is that he likely will not be able to return until he has no restrictions  Cleared to return to full duties, but advised reduced shift length of 8hr for next week  RICE reviewed  Letter updated for work:  Overall significantly improved  Supportive care reviewed  All questions were answered today  Contact us with additional questions or concerns  Signs and sx of concern reviewed      Tristan Monroy DO, ASHLYN  Primary Care Sports Medicine  [facility]               Disclaimer: This note consists of symbols derived from keyboarding, dictation and/or voice recognition software. As a result, there may be errors in the script that have gone undetected. Please consider this when interpreting information found in this chart.

## 2017-08-04 NOTE — LETTER
August 4, 2017      Rashaun [facility]  [address] [facility] [address]        I saw Rashaun again today in my clinic.  He can return to work on 8/7/2017 without formal restriction, he can return to his full duties under his job description.  However, given the nature of his injury, I am recommending that he transition back to work carefully over the first one week. Therefore, while he can perform all of his normal duties while at work, I am recommending a maximum shift length of 8 hours for the first week to help prevent relapse.  I will update restrictions if needed only.  He can plan to return to work without hourly restriction after next week.  Please work with him as much as possible to provide work for him within the small remaining restriction.        Tristan Hanna, DO, CAQ  Primary Care Sports Medicine  [facility]

## 2017-08-04 NOTE — NURSING NOTE
"Chief Complaint   Patient presents with     Knee Pain     f/u left knee injury > 8 weeks       Initial /85  Ht 6' 5\" (1.956 m)  Wt (!) 366 lb (166 kg)  BMI 43.4 kg/m2 Estimated body mass index is 43.4 kg/(m^2) as calculated from the following:    Height as of this encounter: 6' 5\" (1.956 m).    Weight as of this encounter: 366 lb (166 kg).  Medication Reconciliation: complete     Joe Torres ATC  "

## 2017-08-04 NOTE — MR AVS SNAPSHOT
"              After Visit Summary   2017    Rashaun Camara    MRN: [id number]           Patient Information     Date Of Birth          1975        Visit Information        Provider Department      2017 3:20 PM Tristan Monroy DO [facility]        Today's Diagnoses     Knee joint injury, left, subsequent encounter    -  1    Bone marrow edema           Follow-ups after your visit        Who to contact     If you have questions or need follow up information about today's clinic visit or your schedule please contact [facility] directly at [phone].  Normal or non-critical lab and imaging results will be communicated to you by ZYOMYXhart, letter or phone within 4 business days after the clinic has received the results. If you do not hear from us within 7 days, please contact the clinic through [facility] or phone. If you have a critical or abnormal lab result, we will notify you by phone as soon as possible.  Submit refill requests through [facility]Plus or call your pharmacy and they will forward the refill request to us. Please allow 3 business days for your refill to be completed.          Additional Information About Your Visit        MyChart Information     [facility]Plus lets you send messages to your doctor, view your test results, renew your prescriptions, schedule appointments and more. To sign up, go to [email or web address] . Click on \"Log in\" on the left side of the screen, which will take you to the Welcome page. Then click on \"Sign up Now\" on the right side of the page.     You will be asked to enter the access code listed below, as well as some personal information. Please follow the directions to create your username and password.     Your access code is: [email or web address]  Expires: 2017 10:26 AM     Your access code will  in 90 days. If you need help or a new code, please call your [facility] clinic or [phone].        Care " "EveryWhere ID     This is your Care EveryWhere ID. This could be used by other organizations to access your [facility] medical records  [id number]        Your Vitals Were     Height BMI (Body Mass Index)                6' 5\" (1.956 m) 43.4 kg/m2           Blood Pressure from Last 3 Encounters:   08/04/17 130/85   07/11/17 135/88   06/22/17 140/85    Weight from Last 3 Encounters:   08/04/17 (!) 366 lb (166 kg)   07/11/17 (!) 366 lb (166 kg)   06/22/17 (!) 366 lb (166 kg)              Today, you had the following     No orders found for display       Primary Care Provider    None       No address on file        Equal Access to Services     WENDI CHAMBERLAIN : Tayler Christianson, kaushik wright, qaybmaty kaalmada parvin, keila martin . So [facility] [phone].    ATENCIÓN: Si habla español, tiene a dillon disposición servicios gratuitos de asistencia lingüística. Llame al [phone].    We comply with applicable federal civil rights laws and [place] laws. We do not discriminate on the basis of race, color, national origin, age, disability sex, sexual orientation or gender identity.            Thank you!     Thank you for choosing [facility]  for your care. Our goal is always to provide you with excellent care. Hearing back from our patients is one way we can continue to improve our services. Please take a few minutes to complete the written survey that you may receive in the mail after your visit with us. Thank you!             Your Updated Medication List - Protect others around you: Learn how to safely use, store and throw away your medicines at www.disposemymeds.org.          This list is accurate as of: 8/4/17  4:24 PM.  Always use your most recent med list.                   Brand Name Dispense Instructions for use Diagnosis    FLONASE NA      Spray in nostril as needed          "

## 2017-08-07 ENCOUNTER — TELEPHONE (OUTPATIENT)
Dept: ORTHOPEDICS | Facility: CLINIC | Age: 42
End: 2017-08-07

## 2017-08-07 NOTE — TELEPHONE ENCOUNTER
Jaclyn from [facility]. They have received Rashaun's work letter and they have some questions regarding Rashaun's return to work. Would like a call back.

## 2017-08-07 NOTE — TELEPHONE ENCOUNTER
Spoke with Jaclyn, addressed question on if 8 hr time restriction is in place for longer than 1 week  Taina Spencer MS ATC

## 2017-09-09 ENCOUNTER — OFFICE VISIT (OUTPATIENT)
Dept: ORTHOPEDICS | Facility: CLINIC | Age: 42
End: 2017-09-09
Payer: COMMERCIAL

## 2017-09-09 VITALS
HEIGHT: 77 IN | DIASTOLIC BLOOD PRESSURE: 80 MMHG | SYSTOLIC BLOOD PRESSURE: 131 MMHG | BODY MASS INDEX: 37.19 KG/M2 | WEIGHT: 315 LBS

## 2017-09-09 DIAGNOSIS — M25.562 LATERAL KNEE PAIN, LEFT: Primary | ICD-10-CM

## 2017-09-09 PROCEDURE — 99213 OFFICE O/P EST LOW 20 MIN: CPT | Performed by: FAMILY MEDICINE

## 2017-09-09 NOTE — NURSING NOTE
"Chief Complaint   Patient presents with     Knee Pain     f/u left knee injury > 10 weeks       Initial /80  Ht 6' 5\" (1.956 m)  Wt (!) 366 lb (166 kg)  BMI 43.4 kg/m2 Estimated body mass index is 43.4 kg/(m^2) as calculated from the following:    Height as of this encounter: 6' 5\" (1.956 m).    Weight as of this encounter: 366 lb (166 kg).  Medication Reconciliation: complete     Joe Torres ATC  "

## 2017-09-09 NOTE — PROGRESS NOTES
"Rashaun Camara  :  1975  DOS: 2017  MRN: [id number]    Sports Medicine Clinic Visit    PCP: None    Rashaun Camara is a 41 year old male who is seen in consultation at the request of  Talya Bates C.N.P. presenting with acute left knee pain.    Injury: Mowing lawn, slipped landing with left knee bent behind him ~ 2.5 weeks ago, pain started ~ 3 days later and has gradually worsened over time.  Pain located over left deep medial knee, nonradiating.  Additional Features:  Positive: swelling, popping, instability and weakness, severe antalgic gait.  Symptoms are better with Rest and knee brace.  Symptoms are worse with: walking, lying on either side, stairs/step ups, going from sit to stand.  Other evaluation and/or treatments so far consists of: Ice, Ibuprofen, Rest and UC visit, hinged knee brace.  Recent imaging completed: X-rays completed 17.  Prior History of related problems: none  Patient reports that he mild relief after resting from work for ~ 5 days, pain worsened after returning to work yesterday.    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  Reports that his pain has waxed and waned over the last 1 week.  Walking with moderate antalgic gait today after tripping over a bottle hans earlier this AM.  No interim injury.       Interim History - 2017  Since last visit on 2017 patient has minimal improvement in left knee pain.  Reports that he is now able to use stairs and walk with knee with minimal discomfort.  He is lacking terminal knee extension and reports that his knee feels \"weak\".  He has continued to be off work, since his employer is unable to work within his restrictions.  No interim injury.       Interim History - 2017  Since last visit on 17 patient has good overall improvement in left knee pain.  His walking has been " "greatly improved.  He is eager to return to work, although notes his employer has questions about him being able to return with restrictions.  No interim injury.       Interim History - September 9, 2017  Since last visit on 8/7/2017 patient has moderate left knee pain.  Reports that his knee pain has been waxing and waning over the last ~ 4 weeks.  Pain is now located over lateral knee.  Pain is worse going from sit to stand after resting, descending stairs, and lying with leg step.  Currently treating with ~ 1600mg of IBU daily PRN.  No interim injury.         Review of Systems  Musculoskeletal: as above  Remainder of review of systems is negative including constitutional, CV, pulmonary, GI, Skin and Neurologic except as noted in HPI or medical history.    No past medical history on file.  No past surgical history on file.    Objective  /80  Ht 6' 5\" (1.956 m)  Wt (!) 366 lb (166 kg)  BMI 43.4 kg/m2    General: healthy, alert and in no distress    HEENT: no scleral icterus or conjunctival erythema   Skin: no suspicious lesions or rash. No jaundice.   CV: regular rhythm by palpation, 2+ distal pulses, no pedal edema    Resp: normal respiratory effort without conversational dyspnea   Psych: normal mood and affect    Gait: antalgic bearing wt especially with full extension and WB, appropriate coordination and balance   Neuro: normal light touch sensory exam of the extremities. Motor strength as noted below     Left Knee exam    ROM:        Flexion 140 degrees, symmetric       Extension )       Range of motion not limited by pain, mild pain lateral knee with terminal flexion and extension    Inspection:       no visible ecchymosis        effusion not present today    Skin:       no visible deformities       well perfused       capillary refill brisk    Patellar Motion:        Normal patellar tracking noted through range of motion    Tender:        lateral patellar border resolved       medial joint line and " medial femoral condyle resolved/minimal       lateral joint line mild            distal IT Band    Non Tender:         remainder of knee area        medial patellar border        along MCL        infrapatellar tendon       pes anserine bursa       either hamstring tendon    Special Tests:        neg (-) varus at 0 deg and 30 deg       neg (-) valgus at 0 deg and 30 deg       Neg pain with forced extension    Evaluation of ipsilateral kinetic chain       normal strength with hip extension and abduction    Radiology  Results for orders placed or performed in visit on 06/07/17   XR Knee AP Standing Bilateral    Narrative    XR KNEE AP STANDING BILATERAL -  6/7/2017 10:55 AM     HISTORY:  Left knee pain, Pain in left knee.     COMPARISON: None.      Impression    IMPRESSION: Minimal degenerative changes in the right medial and  lateral compartments. Joint spaces of the left knee are maintained. No  fracture or osseous lesion seen on this single view.     ALISE BRADSHAW MD       Assessment:  1. Lateral knee pain, left        Plan:  Discussed the assessment with the patient.  Follow up: prn  Continue full duty at work  Reassurance overall  Focus on HEP and low impact activity  RICE reviewed  Letter updated for work if needed  Overall significantly improved from original injury  Supportive care reviewed  All questions were answered today  Contact us with additional questions or concerns  Signs and sx of concern reviewed      Tristan Monroy DO, ASHLYN  Primary Care Sports Medicine  [facility]               Disclaimer: This note consists of symbols derived from keyboarding, dictation and/or voice recognition software. As a result, there may be errors in the script that have gone undetected. Please consider this when interpreting information found in this chart.

## 2017-09-09 NOTE — LETTER
September 8, 2017      Rashaun [facility]  [address] [facility] [address]        I saw Rashaun again today in my clinic for follow up from his left knee injury.  He can continue to work without formal restriction, able to perform full duties.  I will update restrictions if needed only before his next follow up in 6 weeks.        Tristan Hanna, , CAQ  Primary Care Sports Medicine  [facility]

## 2017-09-14 ENCOUNTER — APPOINTMENT (OUTPATIENT)
Dept: GENERAL RADIOLOGY | Facility: CLINIC | Age: 42
End: 2017-09-14
Attending: PHYSICIAN ASSISTANT
Payer: OTHER MISCELLANEOUS

## 2017-09-14 ENCOUNTER — HOSPITAL ENCOUNTER (EMERGENCY)
Facility: CLINIC | Age: 42
Discharge: HOME | End: 2017-09-14
Attending: PHYSICIAN ASSISTANT | Admitting: PHYSICIAN ASSISTANT
Payer: OTHER MISCELLANEOUS

## 2017-09-14 VITALS
OXYGEN SATURATION: 97 % | BODY MASS INDEX: 43.28 KG/M2 | DIASTOLIC BLOOD PRESSURE: 97 MMHG | WEIGHT: 315 LBS | TEMPERATURE: 98.1 F | SYSTOLIC BLOOD PRESSURE: 173 MMHG | RESPIRATION RATE: 16 BRPM

## 2017-09-14 DIAGNOSIS — S76.012A STRAIN OF LEFT HIP, INITIAL ENCOUNTER: Primary | ICD-10-CM

## 2017-09-14 PROCEDURE — 99213 OFFICE O/P EST LOW 20 MIN: CPT | Performed by: PHYSICIAN ASSISTANT

## 2017-09-14 PROCEDURE — 73502 X-RAY EXAM HIP UNI 2-3 VIEWS: CPT

## 2017-09-14 PROCEDURE — 99213 OFFICE O/P EST LOW 20 MIN: CPT

## 2017-09-14 RX ORDER — HYDROCODONE BITARTRATE AND ACETAMINOPHEN 5; 325 MG/1; MG/1
1-2 TABLET ORAL EVERY 6 HOURS PRN
Qty: 10 TABLET | Refills: 0 | Status: SHIPPED | OUTPATIENT
Start: 2017-09-14 | End: 2017-09-15

## 2017-09-14 ASSESSMENT — ENCOUNTER SYMPTOMS
CARDIOVASCULAR NEGATIVE: 1
CONSTITUTIONAL NEGATIVE: 1
GASTROINTESTINAL NEGATIVE: 1
RESPIRATORY NEGATIVE: 1

## 2017-09-14 NOTE — DISCHARGE INSTRUCTIONS
Hip Strain    You have a strain of the muscles around the hip joint. A muscle strain is a stretching or tearing of muscle fibers. This causes pain, especially when you move that muscle. There may also be some swelling and bruising.  Home care    Stay off the injured leg as much as possible until you can walk on it without pain. If you have a lot of pain with walking, crutches or a walker may be prescribed. These can be rented or purchased at many pharmacies and surgical or orthopedic supply stores. Follow your healthcare provider's advice regarding when to begin putting weight on that leg.    Apply an ice pack over the injured area for 15 to 20 minutes every 3 to 6 hours. You should do this for the first 24 to 48 hours. You can make an ice pack by filling a plastic bag that seals at the top with ice cubes and then wrapping it with a thin towel. Be careful not to injure your skin with the ice treatments. Ice should never be applied directly to skin. Continue the use of ice packs for relief of pain and swelling as needed. After 48 hours, apply heat (warm shower or warm bath) for 15 to 20 minutes several times a day, or alternate ice and heat.    You may use over-the-counter pain medicine to control pain, unless another pain medicine was prescribed. If you have chronic liver or kidney disease or ever had a stomach ulcer or GI bleeding, talk with your healthcare provider beforeusing these medicines.    If you play sports, you may resume these activities when you are able to hop and run on the injured leg without pain.  Follow-up care  Follow up with your healthcare provider, or as advised. If your symptoms do not begin to get better after a week, more tests may be needed.  If X-rays were taken, you will be told of any new findings that may affect your care.  When to seek medical advice  Call your healthcare provider right away if any of these occur:    Increased swelling or bruising    Increased pain    Losing the  ability to put weight on the injured side  Date Last Reviewed: 11/19/2015 2000-2017 [employer]. [address] [place] [address], [place] [address]. All rights reserved. This information is not intended as a substitute for professional medical care. Always follow your healthcare professional's instructions.

## 2017-09-14 NOTE — ED AVS SNAPSHOT
[facility] Emergency Department    [address] [facility] [address]    Phone:  [phone]    Fax:  [phone]                                       Rashaun Camara   MRN: [id number]    Department:  [facility] Emergency Department   Date of Visit:  9/14/2017           After Visit Summary Signature Page     I have received my discharge instructions, and my questions have been answered. I have discussed any challenges I see with this plan with the nurse or doctor.    ..........................................................................................................................................  Patient/Patient Representative Signature      ..........................................................................................................................................  Patient Representative Print Name and Relationship to Patient    ..................................................               ................................................  Date                                            Time    ..........................................................................................................................................  Reviewed by Signature/Title    ...................................................              ..............................................  Date                                                            Time

## 2017-09-14 NOTE — ED AVS SNAPSHOT
[facility] Emergency Department    [address] [facility] [address]    Phone:  [phone]    Fax:  [phone]                                       Rashaun Camara   MRN: [id number]    Department:  [facility] Emergency Department   Date of Visit:  9/14/2017           Patient Information     Date Of Birth          1975        Your diagnoses for this visit were:     Strain of left hip, initial encounter        You were seen by Alexandra Reed PA-C.      Follow-up Information     Follow up with [facility]. Call on 9/18/2017.    Specialty:  Orthopedics and Sports Medicine    Why:  As needed, For persistent symptoms    Contact information:    [address] [place] [address]  [phone]    Additional information:    The medical center is located at   [address] [place] [address] (between [facility] and   [address], [place] miles north   of [place]).        Follow up with [facility].    Specialty:  EMERGENCY MEDICINE    Why:  As needed, If symptoms worsen    Contact information:    [address] [place] [id number] [phone]    Additional information:    The medical center is located at   [address] [place] [address] (between [facility] and   [address], four miles north   of [place]).        Discharge Instructions         Hip Strain    You have a strain of the muscles around the hip joint. A muscle strain is a stretching or tearing of muscle fibers. This causes pain, especially when you move that muscle. There may also be some swelling and bruising.  Home care    Stay off the injured leg as much as possible until you can walk on it without pain. If you have a lot of pain with walking, crutches or a walker may be prescribed. These can be rented or purchased at many pharmacies and surgical or orthopedic supply stores. Follow your healthcare provider's advice regarding when to begin putting weight  on that leg.    Apply an ice pack over the injured area for 15 to 20 minutes every 3 to 6 hours. You should do this for the first 24 to 48 hours. You can make an ice pack by filling a plastic bag that seals at the top with ice cubes and then wrapping it with a thin towel. Be careful not to injure your skin with the ice treatments. Ice should never be applied directly to skin. Continue the use of ice packs for relief of pain and swelling as needed. After 48 hours, apply heat (warm shower or warm bath) for 15 to 20 minutes several times a day, or alternate ice and heat.    You may use over-the-counter pain medicine to control pain, unless another pain medicine was prescribed. If you have chronic liver or kidney disease or ever had a stomach ulcer or GI bleeding, talk with your healthcare provider beforeusing these medicines.    If you play sports, you may resume these activities when you are able to hop and run on the injured leg without pain.  Follow-up care  Follow up with your healthcare provider, or as advised. If your symptoms do not begin to get better after a week, more tests may be needed.  If X-rays were taken, you will be told of any new findings that may affect your care.  When to seek medical advice  Call your healthcare provider right away if any of these occur:    Increased swelling or bruising    Increased pain    Losing the ability to put weight on the injured side  Date Last Reviewed: 11/19/2015 2000-2017 [employer]. [address] [place] [address]. All rights reserved. This information is not intended as a substitute for professional medical care. Always follow your healthcare professional's instructions.          24 Hour Appointment Hotline       To make an appointment at any [facility]), call [phone] ([phone]). If you don't have a family doctor or clinic, we will help you find one. [facility] clinics are conveniently located to serve the needs of you and  your family.             Review of your medicines      START taking        Dose / Directions Last dose taken    HYDROcodone-acetaminophen 5-325 MG per tablet   Commonly known as:  NORCO   Dose:  1-2 tablet   Quantity:  10 tablet        Take 1-2 tablets by mouth every 6 hours as needed for moderate to severe pain   Refills:  0          Our records show that you are taking the medicines listed below. If these are incorrect, please call your family doctor or clinic.        Dose / Directions Last dose taken    FLONASE NA        Spray in nostril as needed   Refills:  0                Prescriptions were sent or printed at these locations (1 Prescription)                   [facility] [place], [place] - [address]   [address] [facility] [address] [address]    Telephone:  [phone]   Fax:  [phone]   Hours:                  Printed at Department/Unit printer (1 of 1)         HYDROcodone-acetaminophen (NORCO) 5-325 MG per tablet                Procedures and tests performed during your visit     Pelvis XR w/ unilateral hip left      Orders Needing Specimen Collection     None      Pending Results     No orders found from 9/12/2017 to 9/15/2017.            Pending Culture Results     No orders found from 9/12/2017 to 9/15/2017.            Pending Results Instructions     If you had any lab results that were not finalized at the time of your Discharge, you can call the ED Lab Result RN at [phone]. You will be contacted by this team for any positive Lab results or changes in treatment. The nurses are available 7 days a week from 10A to 6:30P.  You can leave a message 24 hours per day and they will return your call.        Test Results From Your Hospital Stay        9/14/2017  3:50 PM      Narrative     XR PELVIS AND HIP LEFT 1 VIEW 9/14/2017 3:46 PM    HISTORY: Left hip injury. Anterior tenderness.    COMPARISON: None.    FINDINGS: No fracture or malalignment. Osseous structures are within  normal  "limits.        Impression     IMPRESSION: No acute osseous abnormality.    NADER COBURN MD                Thank you for choosing [facility]       Thank you for choosing [facility] for your care. Our goal is always to provide you with excellent care. Hearing back from our patients is one way we can continue to improve our services. Please take a few minutes to complete the written survey that you may receive in the mail after you visit with us. Thank you!        [email or web address] Information     [employer] lets you send messages to your doctor, view your test results, renew your prescriptions, schedule appointments and more. To sign up, go to [email or web address] . Click on \"Log in\" on the left side of the screen, which will take you to the Welcome page. Then click on \"Sign up Now\" on the right side of the page.     You will be asked to enter the access code listed below, as well as some personal information. Please follow the directions to create your username and password.     Your access code is: [email or web address]  Expires: 2017  4:06 PM     Your access code will  in 90 days. If you need help or a new code, please call your [facility] clinic or [phone].        Care EveryWhere ID     This is your Care EveryWhere ID. This could be used by other organizations to access your [facility] medical records  [id number]        Equal Access to Services     WENDI CHAMBERLAIN : Hadii radhames pereira Soyannick, waaxda luqadaha, qaybta kaalmada adeegyada, keila martin . So [facility] [phone].    ATENCIÓN: Si habla español, tiene a dillon disposición servicios gratuitos de asistencia lingüística. Llame al [phone].    We comply with applicable federal civil rights laws and [place] laws. We do not discriminate on the basis of race, color, national origin, age, disability sex, sexual orientation or gender identity.            After Visit Summary       This is your record. Keep this with you and show to your " community pharmacist(s) and doctor(s) at your next visit.

## 2017-09-14 NOTE — ED PROVIDER NOTES
"  History     Chief Complaint   Patient presents with     Hip Pain     left hip pain after near fall at work yesterday     HPI  Rashaun Camara is a 42 year old male who presents with complaints of left hip pain since yesterday.  Patient states he was standing on a platform at work yesterday when part of it \"shifted,\" and patient extending his left leg back in order to catch himself from falling.  He states he felt something in his left hip immediately at that time.  As the day progressed, he developed progressive pain in his left hip especially with weightbearing despite icing the area and taking Aleve.  Today the pain has persisted.  He states he has full range of motion but has pain with doing so.  Patient denies hx hip injury.    I have reviewed the Medications, Allergies, Past Medical and Surgical History, and Social History in the Epic system.    Allergies:   Allergies   Allergen Reactions     Penicillins Rash         No current facility-administered medications on file prior to encounter.   Current Outpatient Prescriptions on File Prior to Encounter:  Fluticasone Propionate (FLONASE NA) Spray in nostril as needed       There is no problem list on file for this patient.      History reviewed. No pertinent surgical history.    Social History   Substance Use Topics     Smoking status: Never Smoker     Smokeless tobacco: Never Used     Alcohol use No         There is no immunization history on file for this patient.    BMI: Estimated body mass index is 43.28 kg/(m^2) as calculated from the following:    Height as of 9/9/17: 1.956 m (6' 5\").    Weight as of this encounter: 165.6 kg (365 lb).      Review of Systems   Constitutional: Negative.    Respiratory: Negative.    Cardiovascular: Negative.    Gastrointestinal: Negative.    Musculoskeletal:        Left hip pain   Skin: Negative.    All other systems reviewed and are negative.      Physical Exam    BP (!) 173/97  Temp 98.1  F (36.7  C) (Oral)  Resp 16  Wt (!) " "165.6 kg (365 lb)  SpO2 97%  BMI 43.28 kg/m2    Physical Exam   Constitutional: He appears well-developed and well-nourished. No distress.   HENT:   Head: Normocephalic and atraumatic.   Cardiovascular: Intact distal pulses.    Pulmonary/Chest: Effort normal.   Musculoskeletal: Normal range of motion.        Left hip: He exhibits tenderness (anterior and lateral). He exhibits normal range of motion, normal strength, no swelling, no crepitus, no deformity and no laceration.        Left knee: Normal.        Left ankle: Normal.        Left lower leg: Normal.   Patient has full range of motion of his left hip but describes increased pain with flexion, external rotation, and especially internal rotation.     Neurological: He is alert. He has normal strength. No sensory deficit.   Skin: Skin is warm and dry.       ED Course     ED Course     Procedures    Results for orders placed or performed during the hospital encounter of 09/14/17   Pelvis XR w/ unilateral hip left    Narrative    XR PELVIS AND HIP LEFT 1 VIEW 9/14/2017 3:46 PM    HISTORY: Left hip injury. Anterior tenderness.    COMPARISON: None.    FINDINGS: No fracture or malalignment. Osseous structures are within  normal limits.      Impression    IMPRESSION: No acute osseous abnormality.    NADER COBURN MD       Assessments & Plan (with Medical Decision Making)     Pt is a 42 year old male who presents with complaints of left hip pain since yesterday.  Patient states he was standing on a platform at work yesterday when part of it \"shifted,\" and patient extending his left leg back in order to catch himself from falling.  He states he felt something in his left hip immediately at that time.  As the day progressed, he developed progressive pain in his left hip especially with weightbearing despite icing the area and taking Aleve.  Today the pain has persisted.  He states he has full range of motion but has pain with doing so.  Patient denies hx hip injury.  Pt " is afebrile on arrival.  Exam as above.  X-rays of left hip were negative for fracture or acute pathology.  Discussed results with patient.  Suspect hip strain.  Encouraged rest and ice as well as NSAID use for pain and inflammation.  Hand-outs provided.    Patient was sent with [facility] and was instructed to follow-up with orthopedics if no improvement in 5-7 days for continued care and management or sooner if new or worsening symptoms.  He is to return to the ED for persistent and/or worsening symptoms.  Patient expressed understanding of the diagnosis and plan and was discharged home in good condition.     have reviewed the nursing notes.    I have reviewed the findings, diagnosis, plan and need for follow up with the patient.    New Prescriptions    HYDROCODONE-ACETAMINOPHEN (NORCO) 5-325 MG PER TABLET    Take 1-2 tablets by mouth every 6 hours as needed for moderate to severe pain       Final diagnoses:   Strain of left hip, initial encounter       9/14/2017   [facility] EMERGENCY DEPARTMENT     Alexandra Reed PA-C  09/14/17 1603       Alexandra Reed PA-C  09/14/17 1600

## 2017-09-15 ENCOUNTER — TELEPHONE (OUTPATIENT)
Dept: ORTHOPEDICS | Facility: CLINIC | Age: 42
End: 2017-09-15

## 2017-09-15 ENCOUNTER — OFFICE VISIT (OUTPATIENT)
Dept: ORTHOPEDICS | Facility: CLINIC | Age: 42
End: 2017-09-15
Payer: OTHER MISCELLANEOUS

## 2017-09-15 VITALS
DIASTOLIC BLOOD PRESSURE: 85 MMHG | HEIGHT: 77 IN | BODY MASS INDEX: 37.19 KG/M2 | WEIGHT: 315 LBS | SYSTOLIC BLOOD PRESSURE: 140 MMHG

## 2017-09-15 DIAGNOSIS — S76.912A MUSCLE STRAIN OF THIGH, LEFT, INITIAL ENCOUNTER: ICD-10-CM

## 2017-09-15 DIAGNOSIS — S76.012A HIP STRAIN, LEFT, INITIAL ENCOUNTER: Primary | ICD-10-CM

## 2017-09-15 PROCEDURE — 99203 OFFICE O/P NEW LOW 30 MIN: CPT | Performed by: FAMILY MEDICINE

## 2017-09-15 RX ORDER — HYDROCODONE BITARTRATE AND ACETAMINOPHEN 5; 325 MG/1; MG/1
1-2 TABLET ORAL EVERY 6 HOURS PRN
Qty: 20 TABLET | Refills: 0 | Status: SHIPPED | OUTPATIENT
Start: 2017-09-15 | End: 2017-09-27

## 2017-09-15 NOTE — NURSING NOTE
"Chief Complaint   Patient presents with     Musculoskeletal Problem     left hip injury > 2 days       Initial /85  Ht 6' 5\" (1.956 m)  Wt (!) 365 lb (165.6 kg)  BMI 43.28 kg/m2 Estimated body mass index is 43.28 kg/(m^2) as calculated from the following:    Height as of this encounter: 6' 5\" (1.956 m).    Weight as of this encounter: 365 lb (165.6 kg).  Medication Reconciliation: complete     Joe Torres ATC  "

## 2017-09-15 NOTE — TELEPHONE ENCOUNTER
"Patient LVM this AM stating the injured left hip at work on 9/13 and is requesting appt with Dr Monroy.  He was seen in ER on 9/14, XR negative.    Spoke to patient discussed patient reports that he was at work on 9/13, stepped backwards off a \"faulty\" platform, slipped causing \"sharp pop\" sensation in lateral hip.  He was seen in ER yesterday, XR completed, given Norco for pain and instructed to f/u with Dr Monroy.  Rest and pain medication are provided moderate relief.  Patient is requesting to be worked in today d/t work note from ER only having off until 9/18.   Will work patient in for appt this afternoon.      Joe Torres, ATC  "

## 2017-09-15 NOTE — LETTER
Rashaun Jax,  1975, was seen in my office today for an injury at work on 2017. He has a hip strain on the left.  He would like to continue to work as much as possible, and I am allowing this for now as long as his symptoms do not worsen.  If he is unable to perform his normal work duties then he should be medically excused for up to the next week if needed.  He will follow up with me again in one week for re-evaluation, and updated recommendations will be provided at that time, and sooner if needed.        Tristan Hanna DO, CAQ  Primary Care Sports Medicine  [facility]

## 2017-09-15 NOTE — MR AVS SNAPSHOT
"              After Visit Summary   9/15/2017    Rashaun Camara    MRN: [id number]           Patient Information     Date Of Birth          1975        Visit Information        Provider Department      9/15/2017 1:20 PM Tristan Monroy DO [facility] [place]        Today's Diagnoses     Hip strain, left, initial encounter    -  1    Muscle strain of thigh, left, initial encounter           Follow-ups after your visit        Your next 10 appointments already scheduled     Sep 22, 2017  2:00 PM CDT   Return Visit with Tristan Monroy DO   [facility] [place] ([facility] [place])    [address]  [place] [address] [phone]              Who to contact     If you have questions or need follow up information about today's clinic visit or your schedule please contact [facility] [place] directly at [phone].  Normal or non-critical lab and imaging results will be communicated to you by trinkethart, letter or phone within 4 business days after the clinic has received the results. If you do not hear from us within 7 days, please contact the clinic through trinkethart or phone. If you have a critical or abnormal lab result, we will notify you by phone as soon as possible.  Submit refill requests through [employer] or call your pharmacy and they will forward the refill request to us. Please allow 3 business days for your refill to be completed.          Additional Information About Your Visit        [employer] Information     [employer] lets you send messages to your doctor, view your test results, renew your prescriptions, schedule appointments and more. To sign up, go to [email or web address] . Click on \"Log in\" on the left side of the screen, which will take you to the Welcome page. Then click on \"Sign up Now\" on the right side of the page.     You will be asked to enter the access code listed below, as " "well as some personal information. Please follow the directions to create your username and password.     Your access code is: [email or web address]  Expires: 2017  4:06 PM     Your access code will  in 90 days. If you need help or a new code, please call your [facility] clinic or [phone].        Care EveryWhere ID     This is your Care EveryWhere ID. This could be used by other organizations to access your [facility] medical records  [id number]        Your Vitals Were     Height BMI (Body Mass Index)                6' 5\" (1.956 m) 43.28 kg/m2           Blood Pressure from Last 3 Encounters:   09/15/17 140/85   17 (!) 173/97   17 131/80    Weight from Last 3 Encounters:   09/15/17 (!) 365 lb (165.6 kg)   17 (!) 365 lb (165.6 kg)   17 (!) 366 lb (166 kg)              Today, you had the following     No orders found for display         Where to get your medicines      Some of these will need a paper prescription and others can be bought over the counter.  Ask your nurse if you have questions.     Bring a paper prescription for each of these medications     HYDROcodone-acetaminophen 5-325 MG per tablet          Primary Care Provider Office Phone #    [facility] [phone] [address] [address]        Equal Access to Services     WENDI CHAMBERLAIN : Hadii radhames walkero Soyannick, waaxda luqadaha, qaybta kaalmada adeliam, keila martin . So [facility] [phone].    ATENCIÓN: Si habla español, tiene a dillon disposición servicios gratuitos de asistencia lingüística. Llame al [phone].    We comply with applicable federal civil rights laws and [place] laws. We do not discriminate on the basis of race, color, national origin, age, disability sex, sexual orientation or gender identity.            Thank you!     Thank you for choosing [facility]  for your care. Our goal is always to provide " you with excellent care. Hearing back from our patients is one way we can continue to improve our services. Please take a few minutes to complete the written survey that you may receive in the mail after your visit with us. Thank you!             Your Updated Medication List - Protect others around you: Learn how to safely use, store and throw away your medicines at www.disposemymeds.org.          This list is accurate as of: 9/15/17  4:45 PM.  Always use your most recent med list.                   Brand Name Dispense Instructions for use Diagnosis    FLONASE NA      Spray in nostril as needed        HYDROcodone-acetaminophen 5-325 MG per tablet    NORCO    20 tablet    Take 1-2 tablets by mouth every 6 hours as needed for moderate to severe pain    Hip strain, left, initial encounter

## 2017-09-15 NOTE — PROGRESS NOTES
"Rashaun Camara  :  1975  DOS: 9/15/2017  MRN: [id number]    Sports Medicine Clinic Visit    PCP: Clinic, [facility]    Rashaun Camara is a 42 year old male who is seen as an ER referral presenting with left hip injury.    Injury: Working on faulty platform at work, platform tilted forward, causing him to slip, catching himself suddenly and feeling \"sharp pop\" sensation in deep anterior, lateral hip ~ 2 days ago (17).  Was able to finish work shift that evening, but pain progressively worsened following day after returning to work.  Pain located over left deep anterior, lateral hip, nonradiating.  Additional Features:  Positive: popping and weakness.  Symptoms are better with Other medications: Norco and Rest.  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  Other evaluation and/or treatments so far consists of: Ice, Aleve, Other medications: Norco, Rest and ER visit.  Recent imaging completed: X-rays completed 17 in ER.  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    No past medical history on file.  No past surgical history on file.    Objective  /85  Ht 6' 5\" (1.956 m)  Wt (!) 365 lb (165.6 kg)  BMI 43.28 kg/m2    General: healthy, alert and in no distress    HEENT: no scleral icterus or conjunctival erythema   Skin: no suspicious lesions or rash. No jaundice.   CV: regular rhythm by palpation, 2+ distal pulses, no pedal edema    Resp: normal respiratory effort without conversational dyspnea   Psych: normal mood and affect    Gait: nonantalgic, appropriate coordination and balance   Neuro: normal light touch sensory exam of the extremities. Motor strength as noted below "     Left hip exam    Inspection:        No edema or ecchymosis in hip area    ROM:       Full painfree passive ROM except extension, which is painful    Strength:        flexion 5/5       extension 5/5       abduction 5/5       adduction 5/5       Painful active knee extension, hip flexion    Tender:        ASIS, rectus femoris       TFL, glute med    Non Tender:        remainder of hip area       illiac crest       pubis       greater trochanter       SI joint    Sensation:        grossly intact in hip and thigh    Skin:       well perfused       capillary refill brisk    Special Tests:        neg (-) ENEDINA       neg (-) FADIR       neg (-) scour    Radiology  Results for orders placed or performed during the hospital encounter of 09/14/17   Pelvis XR w/ unilateral hip left    Narrative    XR PELVIS AND HIP LEFT 1 VIEW 9/14/2017 3:46 PM    HISTORY: Left hip injury. Anterior tenderness.    COMPARISON: None.    FINDINGS: No fracture or malalignment. Osseous structures are within  normal limits.      Impression    IMPRESSION: No acute osseous abnormality.    NADER COBURN MD       Assessment:  1. Hip strain, left, initial encounter    2. Muscle strain of thigh, left, initial encounter        Plan:  Discussed the assessment with the patient.  Follow up: 1 week  Suspect primarily rectus femoris grade II strain and milder TFL/glute strain  Work-related injury  RICE and supportive care reviewed  Pain medication provided for breakthrough sx  Rest over the weekend and can return to work if able next week  If not improved or not safe to work should be medically excused  Compression shorts advised  Progress NWB/low impact activity as tolerated  Suspect overall 2-6 week recovery period if strain is accurate  No sign of radicular issue, hip joint issue, other significant pathology  Assisitive device use reviewed  We discussed modified progressive pain-free activity as tolerated  Home handouts provided and supportive care  reviewed  All questions were answered today  Contact us with additional questions or concerns  Signs and sx of concern reviewed      Tristan Monroy DO, ASHLYN  Primary Care Sports Medicine  [facility]             Disclaimer: This note consists of symbols derived from keyboarding, dictation and/or voice recognition software. As a result, there may be errors in the script that have gone undetected. Please consider this when interpreting information found in this chart.

## 2017-09-20 ENCOUNTER — TELEPHONE (OUTPATIENT)
Dept: ORTHOPEDICS | Facility: CLINIC | Age: 42
End: 2017-09-20

## 2017-09-20 NOTE — TELEPHONE ENCOUNTER
Spoke to patient discussed that may be off work until his f/u currently scheduled for 9/22.  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  Will address further workability at that time.    Joe Torres ATC

## 2017-09-20 NOTE — TELEPHONE ENCOUNTER
Patient LVM. He has tried to work through his injury, however it is getting worse. Wondering if he should be seen on Friday. Would like a call back.

## 2017-09-20 NOTE — LETTER
10/13/17      ARLEY Leiva 1973 is currently under my care for his left hip strain.  He is having good overall improvement with physical therapy and rest over the last 2 weeks.  He may return to work beginning 10/16/17 with no restrictions.  He will followed up with me again in ~ 3 - 4 weeks, if not continuing to improve.  Please contact my clinic with any questions or concerns that you may have.    Tristan Hanna DO, CAQ  Primary Care Sports Medicine  [facility]

## 2017-09-22 ENCOUNTER — OFFICE VISIT (OUTPATIENT)
Dept: ORTHOPEDICS | Facility: CLINIC | Age: 42
End: 2017-09-22
Payer: OTHER MISCELLANEOUS

## 2017-09-22 VITALS
DIASTOLIC BLOOD PRESSURE: 80 MMHG | HEIGHT: 77 IN | SYSTOLIC BLOOD PRESSURE: 135 MMHG | BODY MASS INDEX: 37.19 KG/M2 | WEIGHT: 315 LBS

## 2017-09-22 DIAGNOSIS — S76.912D MUSCLE STRAIN OF THIGH, LEFT, SUBSEQUENT ENCOUNTER: ICD-10-CM

## 2017-09-22 DIAGNOSIS — S76.012D HIP STRAIN, LEFT, SUBSEQUENT ENCOUNTER: Primary | ICD-10-CM

## 2017-09-22 PROCEDURE — 99213 OFFICE O/P EST LOW 20 MIN: CPT | Performed by: FAMILY MEDICINE

## 2017-09-22 NOTE — NURSING NOTE
"Chief Complaint   Patient presents with     Musculoskeletal Problem     f/u left hip pain       Initial /80  Ht 6' 5\" (1.956 m)  Wt (!) 365 lb (165.6 kg)  BMI 43.28 kg/m2 Estimated body mass index is 43.28 kg/(m^2) as calculated from the following:    Height as of this encounter: 6' 5\" (1.956 m).    Weight as of this encounter: 365 lb (165.6 kg).  Medication Reconciliation: complete     Joe Torres ATC  "

## 2017-09-22 NOTE — MR AVS SNAPSHOT
"              After Visit Summary   9/22/2017    Rashaun Camara    MRN: [id number]           Patient Information     Date Of Birth          1975        Visit Information        Provider Department      9/22/2017 2:00 PM Tristan Monroy,  [facility]        Today's Diagnoses     Hip strain, left, subsequent encounter    -  1    Muscle strain of thigh, left, subsequent encounter           Follow-ups after your visit        Additional Services     PHYSICAL THERAPY REFERRAL       *This therapy referral will be filtered to a centralized scheduling office at [facility] and the patient will receive a call to schedule an appointment at a [facility] location most convenient for them. *     [facility] provides Physical Therapy evaluation and treatment and many specialty services across the [facility] system.  If requesting a specialty program, please choose from the list below.    If you have not heard from the scheduling office within 2 business days, please call [phone] for all locations, with the exception of [place], please call [phone].  Treatment: Evaluation & Treatment  Special Instructions/Modalities: per therapist evaluation  Special Programs: None    Please be aware that coverage of these services is subject to the terms and limitations of your health insurance plan.  Call member services at your health plan with any benefit or coverage questions.      **Note to Provider:  If you are referring outside of [facility] for the therapy appointment, please list the name of the location in the \"special instructions\" above, print the referral and give to the patient to schedule the appointment.                  Your next 10 appointments already scheduled     Sep 29, 2017  9:00 AM CDT   Ortho Eval with Gloria Puckett, PT   [facility] ([facility])    [address] " "[id number]   [phone]            Oct 03, 2017  2:20 PM CDT   Return Visit with Tristan Monroy DO   [facility] ([facility])    [address]  [address]  [place] [address]   [phone]              Who to contact     If you have questions or need follow up information about today's clinic visit or your schedule please contact [facility] directly at [phone].  Normal or non-critical lab and imaging results will be communicated to you by ensemblihart, letter or phone within 4 business days after the clinic has received the results. If you do not hear from us within 7 days, please contact the clinic through [facility] or phone. If you have a critical or abnormal lab result, we will notify you by phone as soon as possible.  Submit refill requests through Payoff or call your pharmacy and they will forward the refill request to us. Please allow 3 business days for your refill to be completed.          Additional Information About Your Visit        Payoff Information     Payoff lets you send messages to your doctor, view your test results, renew your prescriptions, schedule appointments and more. To sign up, go to [email or web address] . Click on \"Log in\" on the left side of the screen, which will take you to the Welcome page. Then click on \"Sign up Now\" on the right side of the page.     You will be asked to enter the access code listed below, as well as some personal information. Please follow the directions to create your username and password.     Your access code is: [email or web address]  Expires: 2017  4:06 PM     Your access code will  in 90 days. If you need help or a new code, please call your [facility] or [phone].        Care EveryWhere ID     This is your Care EveryWhere ID. This could be used by other organizations to access your [facility] medical records  [id number]        Your Vitals Were     " "Height BMI (Body Mass Index)                6' 5\" (1.956 m) 43.28 kg/m2           Blood Pressure from Last 3 Encounters:   09/22/17 135/80   09/15/17 140/85   09/14/17 (!) 173/97    Weight from Last 3 Encounters:   09/22/17 (!) 365 lb (165.6 kg)   09/15/17 (!) 365 lb (165.6 kg)   09/14/17 (!) 365 lb (165.6 kg)              We Performed the Following     PHYSICAL THERAPY REFERRAL        Primary Care Provider Office Phone #    [facility] [phone] [address] [address]        Equal Access to Services     WENDI CHAMBERLAIN : Tayler Christianson, kaushik wright, nishi melendrez, keila farias. So [facility] [phone].    ATENCIÓN: Si habla español, tiene a dillon disposición servicios gratuitos de asistencia lingüística. Llame al [phone].    We comply with applicable federal civil rights laws and [place] laws. We do not discriminate on the basis of race, color, national origin, age, disability sex, sexual orientation or gender identity.            Thank you!     Thank you for choosing [facility]  for your care. Our goal is always to provide you with excellent care. Hearing back from our patients is one way we can continue to improve our services. Please take a few minutes to complete the written survey that you may receive in the mail after your visit with us. Thank you!             Your Updated Medication List - Protect others around you: Learn how to safely use, store and throw away your medicines at www.disposemymeds.org.          This list is accurate as of: 9/22/17  5:49 PM.  Always use your most recent med list.                   Brand Name Dispense Instructions for use Diagnosis    FLONASE NA      Spray in nostril as needed        HYDROcodone-acetaminophen 5-325 MG per tablet    NORCO    20 tablet    Take 1-2 tablets by mouth every 6 hours as needed for moderate to severe pain    Hip " strain, left, initial encounter

## 2017-09-22 NOTE — LETTER
2017      Rashaun Camara,  1975, was seen in my office again today for an injury at work on 2017.  He has a hip strain on the left.  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  He would like to continue to work as much as possible, but I am recommending being off for the next one week to hasten his recovery.  I also recommend physical therapy to start at this time.  He will follow up with me again in two weeks for re-evaluation, and updated recommendations will be provided at that time, and sooner if needed.  He can plan to return to work again as tolerated starting 10/2/2017.      Tristan Hanna, , CAQ  Primary Care Sports Medicine  [facility]

## 2017-09-22 NOTE — PROGRESS NOTES
"Rashaun Camara  :  1975  DOS: 2017  MRN: [id number]    Sports Medicine Clinic Visit    PCP: [facility]    Rashaun Camara is a 42 year old male who is seen as an ER referral presenting with left hip injury.    Injury: Working on faulty platform at work, platform tilted forward, causing him to slip, catching himself suddenly and feeling \"sharp pop\" sensation in deep anterior, lateral hip ~ 2 days ago (17).  Was able to finish work shift that evening, but pain progressively worsened following day after returning to work.  Pain located over left deep anterior, lateral hip, nonradiating.  Additional Features:  Positive: popping and weakness.  Symptoms are better with Other medications: Norco and Rest.  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  Other evaluation and/or treatments so far consists of: Ice, Aleve, Other medications: Norco, Rest and ER visit.  Recent imaging completed: X-rays completed 17 in ER.  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  Also noting significant discomfort with going up and down stairs.  No interim injury.         Review of Systems  Musculoskeletal: as above  Remainder of review of systems is negative including constitutional, CV, pulmonary, GI, Skin and Neurologic except as noted in HPI or medical history.    No past medical history on file.  No past surgical history on file.    Objective  /80  Ht 6' 5\" (1.956 m)  Wt (!) 365 lb (165.6 kg)  BMI 43.28 kg/m2    General: healthy, alert and in no distress    HEENT: no scleral icterus or conjunctival erythema   Skin: no suspicious lesions or rash. No " jaundice.   CV: regular rhythm by palpation, 2+ distal pulses, no pedal edema    Resp: normal respiratory effort without conversational dyspnea   Psych: normal mood and affect    Gait: nonantalgic, appropriate coordination and balance   Neuro: normal light touch sensory exam of the extremities. Motor strength as noted below     Left hip exam    Inspection:        No edema or ecchymosis in hip area    ROM:       Full painfree passive ROM except extension, which is painful but improving    Strength:        flexion 5/5       extension 5/5       abduction 5/5       adduction 5/5       Painful active knee extension, hip flexion    Tender:        ASIS, rectus femoris       TFL, glute med    Non Tender:        remainder of hip area       illiac crest       pubis       greater trochanter       SI joint    Sensation:        grossly intact in hip and thigh    Skin:       well perfused       capillary refill brisk    Special Tests:        neg (-) ENEDINA       neg (-) FADIR       neg (-) scour    Radiology  Results for orders placed or performed during the hospital encounter of 09/14/17   Pelvis XR w/ unilateral hip left    Narrative    XR PELVIS AND HIP LEFT 1 VIEW 9/14/2017 3:46 PM    HISTORY: Left hip injury. Anterior tenderness.    COMPARISON: None.    FINDINGS: No fracture or malalignment. Osseous structures are within  normal limits.      Impression    IMPRESSION: No acute osseous abnormality.    NADER COBURN MD       Assessment:  1. Hip strain, left, subsequent encounter    2. Muscle strain of thigh, left, subsequent encounter        Plan:  Discussed the assessment with the patient.  Follow up: 2 week  Suspect primarily rectus femoris grade II strain and milder TFL/glute strain  Work-related injury  RICE and supportive care reviewed  Pain medication provided for breakthrough sx  One week off work given difficulty with full duty  Plan to return 10/2/17  PT ordered today  Progress NWB/low impact activity as  tolerated  Suspect overall 2-6 week recovery  No sign of radicular issue, hip joint issue, other significant pathology  Assisitive device use reviewed  We discussed modified progressive pain-free activity as tolerated  Home handouts provided and supportive care reviewed  All questions were answered today  Contact us with additional questions or concerns  Signs and sx of concern reviewed      Tristan Monroy DO, CAKIM  Primary Care Sports Medicine  [facility]             Disclaimer: This note consists of symbols derived from keyboarding, dictation and/or voice recognition software. As a result, there may be errors in the script that have gone undetected. Please consider this when interpreting information found in this chart.

## 2017-09-25 NOTE — TELEPHONE ENCOUNTER
Patient LVM. He's been off of Aleve since Friday. He has pain behind his knee when his knee is bent. It wakes him frequently in the night. He also notices that the pain occurs when he coughs as well. Would like a call back.

## 2017-09-25 NOTE — TELEPHONE ENCOUNTER
Patient LVM requesting a call back regarding information below. States pain in the back of his knee travels up his leg and it is not getting better. Would like a call back ASAP.

## 2017-09-25 NOTE — TELEPHONE ENCOUNTER
Spoke with patient.  He feels like this pain is nerve related.  Pain when he tries to flex his knee up into his posterior hip. Feels he is having a cramp in the back of his leg.   He was having relief from the aleve.   Was taking Vicodin for sleep and he is now out.     Denies any back pain.      Did let him know that Dr. Monroy is out of the office today, but he should hear back tomorrow.

## 2017-09-26 NOTE — TELEPHONE ENCOUNTER
Pt calling to check status of message from yesterday. He believes this is nerve pain and is not sure if he should see his PCP or if Dr. Monroy has any other suggestions. He was on aleve for 10 days.     Please call    Shara Gómez  Specialty CSS

## 2017-09-26 NOTE — TELEPHONE ENCOUNTER
Pt calling back to check status of messages left. He is scheduled to be seen tomorrow at [facility] for forms and f/u. Would still like a call back    Please call    Shara Gómez  Specialty CSS

## 2017-09-26 NOTE — TELEPHONE ENCOUNTER
Discussed with Dr Monroy, posterior thigh/knee pain is likely d/t hamstring irritation from his altered gait following his left hip injury, especially that given fact that he is having increased pain with knee flexion.  Would continue to recommend OTC Tylenol PRN, continue to wean from naproxen.    Spoke to patient discussed recommendations from Dr Monroy noted above.  He denies any other nerve pathology, other than radiating pain.  Again stated that majority of pain in back of knee is with knee flexion.  Recommend that he schedule with physical therapy - still awaiting approval from .  He has updated forms that he will email to myself for Dr Monroy to complete.  Advised appt tomorrow is likely not necessary given that he was just evaluated on 9/22.  Will have Dr Monroy complete form, then will contact patient at that time.  Patient will await return phone call from clinic either later today or tomorrow AM.    Joe Torres ATC

## 2017-09-27 ENCOUNTER — OFFICE VISIT (OUTPATIENT)
Dept: FAMILY MEDICINE | Facility: CLINIC | Age: 42
End: 2017-09-27
Payer: COMMERCIAL

## 2017-09-27 ENCOUNTER — OFFICE VISIT (OUTPATIENT)
Dept: ORTHOPEDICS | Facility: CLINIC | Age: 42
End: 2017-09-27
Payer: OTHER MISCELLANEOUS

## 2017-09-27 VITALS
BODY MASS INDEX: 37.19 KG/M2 | WEIGHT: 315 LBS | DIASTOLIC BLOOD PRESSURE: 102 MMHG | HEIGHT: 77 IN | SYSTOLIC BLOOD PRESSURE: 144 MMHG

## 2017-09-27 VITALS
HEIGHT: 77 IN | DIASTOLIC BLOOD PRESSURE: 102 MMHG | SYSTOLIC BLOOD PRESSURE: 144 MMHG | HEART RATE: 72 BPM | TEMPERATURE: 96.9 F | BODY MASS INDEX: 37.19 KG/M2 | WEIGHT: 315 LBS

## 2017-09-27 DIAGNOSIS — S76.012D HIP STRAIN, LEFT, SUBSEQUENT ENCOUNTER: Primary | ICD-10-CM

## 2017-09-27 DIAGNOSIS — Z13.6 CARDIOVASCULAR SCREENING; LDL GOAL LESS THAN 130: ICD-10-CM

## 2017-09-27 DIAGNOSIS — I10 BENIGN ESSENTIAL HYPERTENSION: ICD-10-CM

## 2017-09-27 DIAGNOSIS — Z00.00 ENCOUNTER FOR ROUTINE ADULT HEALTH EXAMINATION WITHOUT ABNORMAL FINDINGS: Primary | ICD-10-CM

## 2017-09-27 DIAGNOSIS — S76.912D MUSCLE STRAIN OF THIGH, LEFT, SUBSEQUENT ENCOUNTER: ICD-10-CM

## 2017-09-27 LAB
ANION GAP SERPL CALCULATED.3IONS-SCNC: 5 MMOL/L (ref 3–14)
BUN SERPL-MCNC: 16 MG/DL (ref 7–30)
CALCIUM SERPL-MCNC: 9.1 MG/DL (ref 8.5–10.1)
CHLORIDE SERPL-SCNC: 103 MMOL/L (ref 94–109)
CHOLEST SERPL-MCNC: 174 MG/DL
CO2 SERPL-SCNC: 28 MMOL/L (ref 20–32)
CREAT SERPL-MCNC: 0.99 MG/DL (ref 0.66–1.25)
GFR SERPL CREATININE-BSD FRML MDRD: 83 ML/MIN/1.7M2
GLUCOSE SERPL-MCNC: 88 MG/DL (ref 70–99)
HDLC SERPL-MCNC: 38 MG/DL
LDLC SERPL CALC-MCNC: 108 MG/DL
NONHDLC SERPL-MCNC: 136 MG/DL
POTASSIUM SERPL-SCNC: 4.4 MMOL/L (ref 3.4–5.3)
SODIUM SERPL-SCNC: 136 MMOL/L (ref 133–144)
TRIGL SERPL-MCNC: 142 MG/DL
TSH SERPL DL<=0.005 MIU/L-ACNC: 3.16 MU/L (ref 0.4–4)

## 2017-09-27 PROCEDURE — 99396 PREV VISIT EST AGE 40-64: CPT | Performed by: NURSE PRACTITIONER

## 2017-09-27 PROCEDURE — 80048 BASIC METABOLIC PNL TOTAL CA: CPT | Performed by: NURSE PRACTITIONER

## 2017-09-27 PROCEDURE — 99213 OFFICE O/P EST LOW 20 MIN: CPT | Performed by: FAMILY MEDICINE

## 2017-09-27 PROCEDURE — 80061 LIPID PANEL: CPT | Performed by: NURSE PRACTITIONER

## 2017-09-27 PROCEDURE — 36415 COLL VENOUS BLD VENIPUNCTURE: CPT | Performed by: NURSE PRACTITIONER

## 2017-09-27 PROCEDURE — 84443 ASSAY THYROID STIM HORMONE: CPT | Performed by: NURSE PRACTITIONER

## 2017-09-27 RX ORDER — LISINOPRIL 20 MG/1
20 TABLET ORAL DAILY
Qty: 30 TABLET | Refills: 3 | Status: SHIPPED | OUTPATIENT
Start: 2017-09-27 | End: 2018-01-15

## 2017-09-27 ASSESSMENT — PAIN SCALES - GENERAL: PAINLEVEL: MILD PAIN (3)

## 2017-09-27 NOTE — PATIENT INSTRUCTIONS
Start Lisinopril 20 mg daily for blood pressure  - return to clinic in 2 weeks for a nurse only visit for blood pressure check  - bring in home cuff for comparison     Continue to see Dr. Monroy for your hip/knee     Lab work today  - will update you on results     Return to clinic in 3 months for blood pressure check       Preventive Health Recommendations  Male Ages 40 to 49    Yearly exam:             See your health care provider every year in order to  o   Review health changes.   o   Discuss preventive care.    o   Review your medicines if your doctor has prescribed any.    You should be tested each year for STDs (sexually transmitted diseases) if you re at risk.     Have a cholesterol test every 5 years.     Have a colonoscopy (test for colon cancer) if someone in your family has had colon cancer or polyps before age 50.     After age 45, have a diabetes test (fasting glucose). If you are at risk for diabetes, you should have this test every 3 years.      Talk with your health care provider about whether or not a prostate cancer screening test (PSA) is right for you.    Shots: Get a flu shot each year. Get a tetanus shot every 10 years.     Nutrition:    Eat at least 5 servings of fruits and vegetables daily.     Eat whole-grain bread, whole-wheat pasta and brown rice instead of white grains and rice.     Talk to your provider about Calcium and Vitamin D.     Lifestyle    Exercise for at least 150 minutes a week (30 minutes a day, 5 days a week). This will help you control your weight and prevent disease.     Limit alcohol to one drink per day.     No smoking.     Wear sunscreen to prevent skin cancer.     See your dentist every six months for an exam and cleaning.

## 2017-09-27 NOTE — TELEPHONE ENCOUNTER
Form completed.  Spoke to patient discussed his hamstring/posterior distal thigh pain is not improving.  He is concerned about returning to work on Monday with this pain, since he having to taking 2000 - 3000mg of Tylenol daily with only mild relief.  He would like to keep appt currently scheduled for this afternoon.    Joe Torres ATC

## 2017-09-27 NOTE — PROGRESS NOTES
"Rashaun Camara  :  1975  DOS: 2017  MRN: [id number]    Sports Medicine Clinic Visit    PCP: Chang, [facility]    Rashaun Camara is a 42 year old male who is seen as an ER referral presenting with left hip injury.    Injury: Working on faulty platform at work, platform tilted forward, causing him to slip, catching himself suddenly and feeling \"sharp pop\" sensation in deep anterior, lateral hip ~ 2 days ago (17).  Was able to finish work shift that evening, but pain progressively worsened following day after returning to work.  Pain located over left deep anterior, lateral hip, nonradiating.  Additional Features:  Positive: popping and weakness.  Symptoms are better with Other medications: Norco and Rest.  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  Other evaluation and/or treatments so far consists of: Ice, Aleve, Other medications: Norco, Rest and ER visit.  Recent imaging completed: X-rays completed 17 in ER.  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  Also noting significant discomfort with going up and down stairs.  No interim injury.       Interim History - 2017  Since last visit on 2017 patient has moderate -severe left distal posterior thigh pain over the last ~ 3 - 4 days.  Pain is worse with knee flexion.  Notes that he is getting discomfort with coughing/sighing.  Denies any numbness/tingling.  No interim injury.       Review of Systems  Musculoskeletal: as above  Remainder of review of systems is negative including constitutional, CV, pulmonary, GI, Skin and Neurologic except as noted in HPI or medical " "history.    No past medical history on file.  No past surgical history on file.    Objective  BP (!) 144/102  Ht 6' 5\" (1.956 m)  Wt (!) 370 lb (167.8 kg)  BMI 43.88 kg/m2    General: healthy, alert and in no distress    HEENT: no scleral icterus or conjunctival erythema   Skin: no suspicious lesions or rash. No jaundice.   CV: regular rhythm by palpation, 2+ distal pulses, no pedal edema    Resp: normal respiratory effort without conversational dyspnea   Psych: normal mood and affect    Gait: nonantalgic, appropriate coordination and balance   Neuro: normal light touch sensory exam of the extremities. Motor strength as noted below     Left hip exam    Inspection:        No edema or ecchymosis in hip area    ROM:       Full painfree passive ROM except extension, which is painful but improving    Strength:        flexion 5/5       extension 5/5       abduction 5/5       adduction 5/5       Painful active knee extension and flexion, hip flexion    Tender:        ASIS, rectus femoris       TFL, glute med       Distal hamstring tendons, milder over rectus muscle belly    Non Tender:        remainder of hip area       illiac crest       pubis       greater trochanter       SI joint    Sensation:        grossly intact in hip and thigh    Skin:       well perfused       capillary refill brisk    Special Tests:        neg (-) ENEDINA       neg (-) FADIR       neg (-) scour    Full strength and ROM in back with normal, symmetric DTRs and neg SLR and slump for radicular sx      Radiology  Results for orders placed or performed during the hospital encounter of 09/14/17   Pelvis XR w/ unilateral hip left    Narrative    XR PELVIS AND HIP LEFT 1 VIEW 9/14/2017 3:46 PM    HISTORY: Left hip injury. Anterior tenderness.    COMPARISON: None.    FINDINGS: No fracture or malalignment. Osseous structures are within  normal limits.      Impression    IMPRESSION: No acute osseous abnormality.    NADER COBURN MD       Assessment:  1. " Hip strain, left, subsequent encounter    2. Muscle strain of thigh, left, subsequent encounter        Plan:  Discussed the assessment with the patient.  Follow up: 2-3 weeks  Still suspect primarily rectus femoris grade II strain and milder TFL/glute strain, ongoing sequela from altered gait and ongoing work  Work-related injury  RICE and supportive care reviewed  Pain medication use reviewed  Letter updated for work, see recs in Epic and form filled out and sent for scanning into Epic  PT ordered previously, start when approved by WC  Progress NWB/low impact activity as tolerated  Suspect overall 2-6 week recovery  No sign of radicular issue, hip joint issue, other significant pathology  Assisitive device use reviewed  We discussed modified progressive pain-free activity as tolerated  Home handouts provided and supportive care reviewed  All questions were answered today  Contact us with additional questions or concerns  Signs and sx of concern reviewed      Tritsan Monroy DO, ASHLYN  Primary Care Sports Medicine  [facility]             Disclaimer: This note consists of symbols derived from keyboarding, dictation and/or voice recognition software. As a result, there may be errors in the script that have gone undetected. Please consider this when interpreting information found in this chart.

## 2017-09-27 NOTE — MR AVS SNAPSHOT
After Visit Summary   9/27/2017    Rashaun Camara    MRN: [id number]           Patient Information     Date Of Birth          1975        Visit Information        Provider Department      9/27/2017 4:40 PM Tristan Monroy,  [facility]        Today's Diagnoses     Hip strain, left, subsequent encounter    -  1    Muscle strain of thigh, left, subsequent encounter           Follow-ups after your visit        Your next 10 appointments already scheduled     Oct 04, 2017  3:30 PM CDT   Ortho Treatment with Gloria Puckett, PT   [facility] ([facility])    [address]  [address] [place] [address]   [phone]            Oct 09, 2017 11:00 AM CDT   Ortho Treatment with Gloria Puckett PT   [facility] ([facility])    [address] [address]   [phone]            Oct 13, 2017  8:30 AM CDT   Ortho Treatment with Gloria Puckett PT   [facility])    [address] [address]   [phone]              Who to contact     If you have questions or need follow up information about today's clinic visit or your schedule please contact [facility] directly at [phone].  Normal or non-critical lab and imaging results will be communicated to you by MyChart, letter or phone within 4 business days after the clinic has received the results. If you do not hear from us within 7 days, please contact the clinic through MyChart or phone. If you have a critical or abnormal lab result, we will notify you by phone as soon as possible.  Submit refill requests through [email or web address] or call your pharmacy and they will forward the refill request to us. Please allow 3 business days for your refill to be completed.          Additional Information About Your Visit       "  MyChart Information     [facility] [employer] lets you send messages to your doctor, view your test results, renew your prescriptions, schedule appointments and more. To sign up, go to [email or web address] . Click on \"Log in\" on the left side of the screen, which will take you to the Welcome page. Then click on \"Sign up Now\" on the right side of the page.     You will be asked to enter the access code listed below, as well as some personal information. Please follow the directions to create your username and password.     Your access code is: [email or web address]  Expires: 2017  4:06 PM     Your access code will  in 90 days. If you need help or a new code, please call your [facility] clinic or [phone].        Care EveryWhere ID     This is your Care EveryWhere ID. This could be used by other organizations to access your [facility] medical records  [id number]        Your Vitals Were     Height BMI (Body Mass Index)                6' 5\" (1.956 m) 43.88 kg/m2           Blood Pressure from Last 3 Encounters:   17 (!) 144/102   17 (!) 144/102   17 135/80    Weight from Last 3 Encounters:   17 (!) 370 lb (167.8 kg)   17 (!) 370 lb 3.2 oz (167.9 kg)   17 (!) 365 lb (165.6 kg)              Today, you had the following     No orders found for display         Today's Medication Changes          These changes are accurate as of: 17 11:59 PM.  If you have any questions, ask your nurse or doctor.               Start taking these medicines.        Dose/Directions    lisinopril 20 MG tablet   Commonly known as:  PRINIVIL/ZESTRIL   Used for:  Benign essential hypertension   Started by:  Tlaya Bates APRN CNP        Dose:  20 mg   Take 1 tablet (20 mg) by mouth daily   Quantity:  30 tablet   Refills:  3            Where to get your medicines      These medications were sent to [facility] [place] [address] "     Phone:  [phone]     lisinopril 20 MG tablet                Primary Care Provider Office Phone # Fax #    [facility] [phone] [address] [address]        Equal Access to Services     WENDI CHAMBERLAIN : Hadii aad ku hadasho Soomaali, waaxda luqadaha, qaybta kaalmada adeegyada, waxay idiin hayaan adeeg arpit latin farias. So [facility] [phone].    ATENCIÓN: Si habla español, tiene a dillon disposición servicios gratuitos de asistencia lingüística. Llame al [phone].    We comply with applicable federal civil rights laws and [place] laws. We do not discriminate on the basis of race, color, national origin, age, disability, sex, sexual orientation, or gender identity.            Thank you!     Thank you for choosing [facility] [place]  for your care. Our goal is always to provide you with excellent care. Hearing back from our patients is one way we can continue to improve our services. Please take a few minutes to complete the written survey that you may receive in the mail after your visit with us. Thank you!             Your Updated Medication List - Protect others around you: Learn how to safely use, store and throw away your medicines at www.disposemymeds.org.          This list is accurate as of: 9/27/17 11:59 PM.  Always use your most recent med list.                   Brand Name Dispense Instructions for use Diagnosis    FLONASE NA      Spray in nostril as needed        lisinopril 20 MG tablet    PRINIVIL/ZESTRIL    30 tablet    Take 1 tablet (20 mg) by mouth daily    Benign essential hypertension

## 2017-09-27 NOTE — PROGRESS NOTES
SUBJECTIVE:   CC: Rashaun Camara is an 42 year old male who presents for preventative health visit.     Healthy Habits:    Do you get at least three servings of calcium containing foods daily (dairy, green leafy vegetables, etc.)? yes    Amount of exercise or daily activities, outside of work: work is very physical    Problems taking medications regularly not applicable    Medication side effects: No    Have you had an eye exam in the past two years? yes    Do you see a dentist twice per year? no    Do you have sleep apnea, excessive snoring or daytime drowsiness? unsure      Hypertension Follow-up      Outpatient blood pressures are not being checked.    Low Salt Diet: no added salt    Had been losing weight prior to knee and after knee recovered   Having some hip pain   Stopped drinking energy drinks, coffee is minimal       Today's PHQ-2 Score:   PHQ-2 ( 1999 Pf[employer]) 9/27/2017   Q1: Little interest or pleasure in doing things 1   Q2: Feeling down, depressed or hopeless 0   PHQ-2 Score 1         Abuse: Current or Past(Physical, Sexual or Emotional)- No  Do you feel safe in your environment - Yes  Social History   Substance Use Topics     Smoking status: Never Smoker     Smokeless tobacco: Never Used     Alcohol use No     The patient does not drink >3 drinks per day nor >7 drinks per week.    Last PSA: No results found for: PSA    Reviewed orders with patient. Reviewed health maintenance and updated orders accordingly - Yes  Labs reviewed in EPIC    Reviewed and updated as needed this visit by clinical staff  Tobacco  Allergies  Meds  Problems  Med Hx  Surg Hx  Fam Hx  Soc Hx          Reviewed and updated as needed this visit by Provider  Allergies  Meds  Problems        History reviewed. No pertinent past medical history.   History reviewed. No pertinent surgical history.    ROS:  C: NEGATIVE for fever, chills, change in weight  I: NEGATIVE for worrisome rashes, moles or lesions  E: NEGATIVE for vision  "changes or irritation  ENT: NEGATIVE for ear, mouth and throat problems  R: NEGATIVE for significant cough or SOB  CV: NEGATIVE for chest pain, palpitations or peripheral edema  GI: NEGATIVE for nausea, abdominal pain, heartburn, or change in bowel habits   male: negative for dysuria, hematuria, decreased urinary stream, erectile dysfunction, urethral discharge  MUSCULOSKELETAL:POSITIVE  for hamstring pain   N: NEGATIVE for weakness, dizziness or paresthesias  P: NEGATIVE for changes in mood or affect    OBJECTIVE:   BP (!) 144/102  Pulse 72  Temp 96.9  F (36.1  C) (Tympanic)  Ht 6' 5\" (1.956 m)  Wt (!) 370 lb 3.2 oz (167.9 kg)  BMI 43.9 kg/m2  EXAM:  GENERAL: healthy, alert and no distress  EYES: Eyes grossly normal to inspection, PERRL and conjunctivae and sclerae normal  HENT: ear canals and TM's normal, nose and mouth without ulcers or lesions  NECK: no adenopathy, no asymmetry, masses, or scars and thyroid normal to palpation  RESP: lungs clear to auscultation - no rales, rhonchi or wheezes  CV: regular rate and rhythm, normal S1 S2, no S3 or S4, no murmur, click or rub, no peripheral edema and peripheral pulses strong  ABDOMEN: soft, nontender, no hepatosplenomegaly, no masses and bowel sounds normal   (male): normal male genitalia without lesions or urethral discharge, no hernia  MS: no gross musculoskeletal defects noted, no edema  SKIN: no suspicious lesions or rashes  NEURO: Normal strength and tone, mentation intact and speech normal  PSYCH: mentation appears normal, affect normal/bright    ASSESSMENT/PLAN:   1. Encounter for routine adult health examination without abnormal findings    - Basic metabolic panel  (Ca, Cl, CO2, Creat, Gluc, K, Na, BUN)  - TSH with free T4 reflex    2. Benign essential hypertension  Patient's blood pressure's elevated over the last several visits. History of hypertension in the past, treated and then taken off for good blood pressure's. Patient has been working on " "weight loss, but has had some set backs with knee injury and hamstring strain. Discussed weight loss versus medication management. At this point feel patient would benefit from medication management until weight loss is achieved. Patient agreeable. Information provided on Well together program. Start Lisinopril, follow up 2 weeks nurse visit for blood pressure check.    - lisinopril (PRINIVIL/ZESTRIL) 20 MG tablet; Take 1 tablet (20 mg) by mouth daily  Dispense: 30 tablet; Refill: 3    3. CARDIOVASCULAR SCREENING; LDL GOAL LESS THAN 130    - LIPID REFLEX TO DIRECT LDL PANEL    COUNSELING:  Reviewed preventive health counseling, as reflected in patient instructions       Regular exercise       Healthy diet/nutrition       reports that he has never smoked. He has never used smokeless tobacco.      Estimated body mass index is 43.9 kg/(m^2) as calculated from the following:    Height as of this encounter: 6' 5\" (1.956 m).    Weight as of this encounter: 370 lb 3.2 oz (167.9 kg).   Weight management plan: Discussed healthy diet and exercise guidelines and patient will follow up in 6 months in clinic to re-evaluate. Specific weight management program called Well Together  discussed and follow up in 6 months in clinic to re-evaluate.    Counseling Resources:  ATP IV Guidelines  Pooled Cohorts Equation Calculator  FRAX Risk Assessment  ICSI Preventive Guidelines  Dietary Guidelines for Americans, 2010  USDA's MyPlate  ASA Prophylaxis  Lung CA Screening    JARETT Choudhary [facility]  "

## 2017-09-27 NOTE — NURSING NOTE
"Chief Complaint   Patient presents with     Physical     Hypertension       Initial BP (!) 148/98 (BP Location: Right arm, Patient Position: Chair, Cuff Size: Adult Large)  Pulse 72  Temp 96.9  F (36.1  C) (Tympanic)  Ht 6' 5\" (1.956 m)  Wt (!) 370 lb 3.2 oz (167.9 kg)  BMI 43.9 kg/m2 Estimated body mass index is 43.9 kg/(m^2) as calculated from the following:    Height as of this encounter: 6' 5\" (1.956 m).    Weight as of this encounter: 370 lb 3.2 oz (167.9 kg).  Medication Reconciliation: complete    Health Maintenance that is potentially due pending provider review:  Lipids - pt fasting      Alison Pavon MA  8:13 AM 9/27/2017  .        "

## 2017-09-27 NOTE — NURSING NOTE
"Chief Complaint   Patient presents with     Musculoskeletal Problem     f/u left hip injury       Initial BP (!) 144/102  Ht 6' 5\" (1.956 m)  Wt (!) 370 lb (167.8 kg)  BMI 43.88 kg/m2 Estimated body mass index is 43.88 kg/(m^2) as calculated from the following:    Height as of this encounter: 6' 5\" (1.956 m).    Weight as of this encounter: 370 lb (167.8 kg).  Medication Reconciliation: complete     Joe Torres ATC  "

## 2017-09-27 NOTE — MR AVS SNAPSHOT
After Visit Summary   9/27/2017    Rashaun Camara    MRN: [id number]           Patient Information     Date Of Birth          1975        Visit Information        Provider Department      9/27/2017 7:40 AM Talya Bates APRN [facility]        Today's Diagnoses     Encounter for routine adult health examination without abnormal findings    -  1    CARDIOVASCULAR SCREENING; LDL GOAL LESS THAN 130        Benign essential hypertension          Care Instructions    Start Lisinopril 20 mg daily for blood pressure  - return to clinic in 2 weeks for a nurse only visit for blood pressure check  - bring in home cuff for comparison     Continue to see Dr. Monroy for your hip/knee     Lab work today  - will update you on results     Return to clinic in 3 months for blood pressure check       Preventive Health Recommendations  Male Ages 40 to 49    Yearly exam:             See your health care provider every year in order to  o   Review health changes.   o   Discuss preventive care.    o   Review your medicines if your doctor has prescribed any.    You should be tested each year for STDs (sexually transmitted diseases) if you re at risk.     Have a cholesterol test every 5 years.     Have a colonoscopy (test for colon cancer) if someone in your family has had colon cancer or polyps before age 50.     After age 45, have a diabetes test (fasting glucose). If you are at risk for diabetes, you should have this test every 3 years.      Talk with your health care provider about whether or not a prostate cancer screening test (PSA) is right for you.    Shots: Get a flu shot each year. Get a tetanus shot every 10 years.     Nutrition:    Eat at least 5 servings of fruits and vegetables daily.     Eat whole-grain bread, whole-wheat pasta and brown rice instead of white grains and rice.     Talk to your provider about Calcium and Vitamin D.     Lifestyle    Exercise for at least 150 minutes a  week (30 minutes a day, 5 days a week). This will help you control your weight and prevent disease.     Limit alcohol to one drink per day.     No smoking.     Wear sunscreen to prevent skin cancer.     See your dentist every six months for an exam and cleaning.              Follow-ups after your visit        Your next 10 appointments already scheduled     Sep 27, 2017  4:40 PM CDT   Return Visit with Tristan Monroy DO   [facility] [place] ([facility] [place])    [address]  [place] [address]   [phone]            Sep 29, 2017  9:00 AM CDT   Ortho Eval with Gloria Puckett PT   [facility] ([facility])    [address]  [address]  [place] [place] [id number] [phone]            Oct 03, 2017  2:20 PM CDT   Return Visit with Tristan Monroy DO   [facility] [place] ([facility])    [address]  [address]  [place] [place] [facility] [place] [address] [phone]              Who to contact     If you have questions or need follow up information about today's clinic visit or your schedule please contact [facility] directly at [phone].  Normal or non-critical lab and imaging results will be communicated to you by MyChart, letter or phone within 4 business days after the clinic has received the results. If you do not hear from us within 7 days, please contact the clinic through MyChart or phone. If you have a critical or abnormal lab result, we will notify you by phone as soon as possible.  Submit refill requests through [facility] or call your pharmacy and they will forward the refill request to us. Please allow 3 business days for your refill to be completed.          Additional Information About Your Visit        [facility] Information     [facility] lets you send messages to your doctor, view your test results, renew your prescriptions, schedule  "appointments and more. To sign up, go to [email or web address] . Click on \"Log in\" on the left side of the screen, which will take you to the Welcome page. Then click on \"Sign up Now\" on the right side of the page.     You will be asked to enter the access code listed below, as well as some personal information. Please follow the directions to create your username and password.     Your access code is: [email or web address]  Expires: 2017  4:06 PM     Your access code will  in 90 days. If you need help or a new code, please call your [place] clinic or [phone].        Care EveryWhere ID     This is your Care EveryWhere ID. This could be used by other organizations to access your [place] medical records  [id number]        Your Vitals Were     Pulse Temperature Height BMI (Body Mass Index)          72 96.9  F (36.1  C) (Tympanic) 6' 5\" (1.956 m) 43.9 kg/m2         Blood Pressure from Last 3 Encounters:   17 (!) 144/102   17 135/80   09/15/17 140/85    Weight from Last 3 Encounters:   17 (!) 370 lb 3.2 oz (167.9 kg)   17 (!) 365 lb (165.6 kg)   09/15/17 (!) 365 lb (165.6 kg)              We Performed the Following     Basic metabolic panel  (Ca, Cl, CO2, Creat, Gluc, K, Na, BUN)     LIPID REFLEX TO DIRECT LDL PANEL     TSH with free T4 reflex          Today's Medication Changes          These changes are accurate as of: 17  8:53 AM.  If you have any questions, ask your nurse or doctor.               Start taking these medicines.        Dose/Directions    lisinopril 20 MG tablet   Commonly known as:  PRINIVIL/ZESTRIL   Used for:  Benign essential hypertension   Started by:  Talya Bates APRN CNP        Dose:  20 mg   Take 1 tablet (20 mg) by mouth daily   Quantity:  30 tablet   Refills:  3            Where to get your medicines      These medications were sent to [facility] [address] " [address]     Phone:  [phone]     lisinopril 20 MG tablet                Primary Care Provider Office Phone #    [facility] [phone] [address] [address]        Equal Access to Services     WENDI CHAMBERLAIN : Tayler dumont aarono Soomaali, waaxda luqadaha, qaybta kaalmada adeegyada, keila allanin hayaan yoavshelby munson mario farias. So [facility] [phone].    ATENCIÓN: Si habla español, tiene a dillon disposición servicios gratuitos de asistencia lingüística. Llame al [phone].    We comply with applicable federal civil rights laws and [place] laws. We do not discriminate on the basis of race, color, national origin, age, disability sex, sexual orientation or gender identity.            Thank you!     Thank you for choosing [facility]  for your care. Our goal is always to provide you with excellent care. Hearing back from our patients is one way we can continue to improve our services. Please take a few minutes to complete the written survey that you may receive in the mail after your visit with us. Thank you!             Your Updated Medication List - Protect others around you: Learn how to safely use, store and throw away your medicines at www.disposemymeds.org.          This list is accurate as of: 9/27/17  8:53 AM.  Always use your most recent med list.                   Brand Name Dispense Instructions for use Diagnosis    FLONASE NA      Spray in nostril as needed        HYDROcodone-acetaminophen 5-325 MG per tablet    NORCO    20 tablet    Take 1-2 tablets by mouth every 6 hours as needed for moderate to severe pain    Hip strain, left, initial encounter       lisinopril 20 MG tablet    PRINIVIL/ZESTRIL    30 tablet    Take 1 tablet (20 mg) by mouth daily    Benign essential hypertension

## 2017-09-28 NOTE — TELEPHONE ENCOUNTER
JASON was signed by patient last evening to release his OV notes and physical therapy orders to  for his left hip injury.  Copy of OV notes, physical therapy order, and workability letters faxed to his  rep whose info is below.  They may contact clinic with further questions or concerns.    Claim #[id number]   [facility]  [id number]  [facility] [address]    Evita Bowling is the manager Hanna Montesinos is listed as the   Fax [phone]     Joe Torres ATC

## 2017-09-29 ENCOUNTER — HOSPITAL ENCOUNTER (OUTPATIENT)
Dept: PHYSICAL THERAPY | Facility: CLINIC | Age: 42
Setting detail: THERAPIES SERIES
End: 2017-09-29
Attending: FAMILY MEDICINE
Payer: OTHER MISCELLANEOUS

## 2017-09-29 PROCEDURE — 97161 PT EVAL LOW COMPLEX 20 MIN: CPT | Mod: GP | Performed by: PHYSICAL THERAPIST

## 2017-09-29 PROCEDURE — 40000718 ZZHC STATISTIC PT DEPARTMENT ORTHO VISIT: Performed by: PHYSICAL THERAPIST

## 2017-09-29 PROCEDURE — 97035 APP MDLTY 1+ULTRASOUND EA 15: CPT | Mod: GP | Performed by: PHYSICAL THERAPIST

## 2017-09-29 PROCEDURE — 97140 MANUAL THERAPY 1/> REGIONS: CPT | Mod: GP | Performed by: PHYSICAL THERAPIST

## 2017-09-29 NOTE — PROGRESS NOTES
" PHYSICAL THERAPY INITIAL EVALUATION  09/29/17 0800   General Information   Type of Visit Initial OP Ortho PT Evaluation   Start of Care Date 09/29/17   Referring Physician Dr. Monroy   Patient/Family Goals Statement get rid of the pain, get back to work   Orders Evaluate and Treat   Date of Order 09/22/17   Insurance Type Other   Insurance Comments/Visits Authorized Work Comp   Medical Diagnosis left hip strain, left muscle strain of thigh   Surgical/Medical history reviewed Yes   Precautions/Limitations no known precautions/limitations   Body Part(s)   Body Part(s) Hip   Presentation and Etiology   Pertinent history of current problem (include personal factors and/or comorbidities that impact the POC) Patient reports he was stepping backwards on a platform that was wobbly. States it tipped backwards and he started to fall and he caught himself by jerking himself forward while his left foot was planted behind him. States he felt a \"pop\" in his hip. Was able to finish work shift that evening, but pain progressively worsened following day after returning to work. The pain has continued to worsen over the course of the past few weeks. He reports when he coughs he can feel it. When he lays flat in bed it got a lot worse. Seems to not have worsened once he layed down on the floor. Hurts that worse when he tries to flex his knee. Gets really stiff when he wakes in the morning and can't bend his knee.    Impairments A. Pain;B. Decreased WB tolerance;C. Swelling;E. Decreased flexibility;F. Decreased strength and endurance;H. Impaired gait;J. Burning   Functional Limitations perform activities of daily living;perform required work activities;perform desired leisure / sports activities   Symptom Location L anterior/lateral hip, anterior and posterior thigh   How/Where did it occur At work   Onset date of current episode/exacerbation 09/13/17   Chronicity New   Pain rating (0-10 point scale) Best (/10);Worst (/10)   Best (/10) " 1   Worst (/10) 9   Pain quality B. Dull;C. Aching;E. Shooting;G. Cramping   Frequency of pain/symptoms A. Constant   Pain/symptoms are: Worse during the night   Pain/symptoms exacerbated by G. Certain positions;I. Bending;L. Work tasks;M. Other   Pain exacerbation comment sleeping   Pain/symptoms eased by A. Sitting;B. Walking;F. Certain positions;G. Heat;I. OTC medication(s)   Progression of symptoms since onset: Worsened   Prior Level of Function   Prior Level of Function-Mobility independent   Prior Level of Function-ADLs independent   Current Level of Function   Patient role/employment history A. Employed   Current equipment-Gait/Locomotion None   Fall Risk Screen   Fall screen completed by PT   Per patient - Fall 2 or more times in past year? No   Per patient - Fall with injury in past year? No   Is patient a fall risk? No   Functional Scales   Functional Scales Other   Other Scales  LEFS: 40/80   Hip Objective Findings   Side (if bilateral, select both right and left) Right;Left   Gait/Locomotion antalgic   Hip ROM Comments painful active hip flexion and active knee flexion. Minimal to no pain with pure passive knee flexion and hip flexion.   Lumbar ROM Lumbar AROM WNL and non-painful, some stretching felt in L hamstring with forward flexion   Pelvic Screen + supine to sit, - pubic symph   Femoral Nerve Stretch Test -   Resisted Hip Adduction Test -   Straight Leg Raise Test -   Scour Test -   ENEDINA Test -   FADIR Test -   Palpation very tender on the L biceps femoris tendon, along length hamstring up to ischial tuberosity, iliopsoas/RF, gluteals and piriformis   Right Hip Flexion PROM WNL   Right Hip Abduction PROM WNL   Right Hip Adduction PROM WNL   Right Hip ER PROM WNL   Right Hip IR PROM WNL   Lior Flexibility Test mild tight L   Obers/ITB Flexibility mild tight on L   Left Hip Flexion PROM WNL   Left Hip Abduction PROM WNL   Left Hip Adduction PROM WNL   Left Hip ER PROM WNL, pain in post lateral  knee with testing   Left Hip IR PROM WNL   Left Hip Flexion Strength 5/5, pain   Left Hip Abduction Strength 4+/5   Left Knee Flexion Strength 5/5, pain   Left Knee Extension Strength tests strong at mid range but difficulty moving into terminal knee extension     Left Hamstring Flexibility mod tight   Left Prone Quad Flexibility mod tight   Planned Therapy Interventions   Planned Therapy Interventions balance training;gait training;joint mobilization;manual therapy;neuromuscular re-education;ROM;strengthening;stretching   Planned Modality Interventions   Planned Modality Interventions Ultrasound   Clinical Impression   Criteria for Skilled Therapeutic Interventions Met yes, treatment indicated   PT Diagnosis Left hip and thigh pain   Influenced by the following impairments pain, decreased flexibility, decreased ROM, decreased strength   Functional limitations due to impairments walking, stairs, squatting, lifting, rolling in bed   Clinical Presentation Evolving/Changing   Clinical Presentation Rationale worsening symptoms with now posterior thigh pain   Clinical Decision Making (Complexity) Low complexity   Therapy Frequency 2 times/Week   Predicted Duration of Therapy Intervention (days/wks) 2-3 weeks, 1x/week for 3 weeks, 1x every other week for 2 weeks   Risk & Benefits of therapy have been explained Yes   Patient, Family & other staff in agreement with plan of care Yes   Clinical Impression Comments Patient presenting with left hip and thigh pain consistent with likely rectus femoris muscle strain and biceps femoris msucle strain.     Education Assessment   Preferred Learning Style Listening;Demonstration;Pictures/video   Barriers to Learning No barriers   ORTHO GOALS   PT Ortho Eval Goals 1;2;3;4   Ortho Goal 1   Goal Identifier 1   Goal Description Patient will be able to walk short distances without a limp and minmal pain.   Target Date 10/20/17   Ortho Goal 2   Goal Identifier 2   Goal Description Patient  will be able to ascend/descend stairs without difficulty and minimal pain.   Target Date 11/10/17   Ortho Goal 3   Goal Identifier 3   Goal Description Patient will be able to return to work without restriction and pain 2/10 or less at end of work shift.   Target Date 11/24/17   Ortho Goal 4   Goal Identifier 4   Goal Description Patient will be independent and compliant with HEP to aid functional recovery.    Target Date 11/24/17   Total Evaluation Time   Total Evaluation Time 30        Please contact me with any questions or concerns.  Thank you for your referral.    Gloria Puckett, PT, DPT, OCS  Physical Therapist, Orthopedic Certified Specialist  [facility]  [phone]

## 2017-10-02 ENCOUNTER — HOSPITAL ENCOUNTER (OUTPATIENT)
Dept: PHYSICAL THERAPY | Facility: CLINIC | Age: 42
Setting detail: THERAPIES SERIES
End: 2017-10-02
Attending: FAMILY MEDICINE
Payer: OTHER MISCELLANEOUS

## 2017-10-02 ENCOUNTER — TELEPHONE (OUTPATIENT)
Dept: ORTHOPEDICS | Facility: CLINIC | Age: 42
End: 2017-10-02

## 2017-10-02 DIAGNOSIS — S89.92XD INJURY OF LEFT LOWER EXTREMITY, SUBSEQUENT ENCOUNTER: Primary | ICD-10-CM

## 2017-10-02 PROCEDURE — 40000718 ZZHC STATISTIC PT DEPARTMENT ORTHO VISIT: Performed by: PHYSICAL THERAPIST

## 2017-10-02 PROCEDURE — 97035 APP MDLTY 1+ULTRASOUND EA 15: CPT | Mod: GP | Performed by: PHYSICAL THERAPIST

## 2017-10-02 PROCEDURE — 97140 MANUAL THERAPY 1/> REGIONS: CPT | Mod: GP | Performed by: PHYSICAL THERAPIST

## 2017-10-02 RX ORDER — CYCLOBENZAPRINE HCL 10 MG
10 TABLET ORAL
Qty: 30 TABLET | Refills: 1 | Status: SHIPPED | OUTPATIENT
Start: 2017-10-02 | End: 2018-01-15

## 2017-10-02 NOTE — TELEPHONE ENCOUNTER
Patient notified that rx had been provided.  His pain/function has been waxing/waning.  He asked about when to f/u.  Advised to continue to monitor for remainder of week and contact clinic with update on 10/6.      Joe Torres ATC

## 2017-10-02 NOTE — TELEPHONE ENCOUNTER
----- Message from Gloria Puckett, PT sent at 9/29/2017 10:19 AM CDT -----  Regarding: pt wondering about muscle relaxer  Dr. Monroy,  I evaluated Rashaun today. I think my exam was on point with what you found. Rashaun was concerned about nerve involvement but I did not find any on my exam, still just seems muscular/tendon. His biggest issue today was his posterior knee pain. His biceps femoris tendon was pretty hot when I palpated it. He states that he is getting maybe 2 hours of sleep at night, seems to hurt the most then. We talked some about sleeping positions. He was wondering about a muscle relaxer to take at night to help him sleep. I told him that I needed to defer that question to you. Thoughts?     Thanks,  Gloria

## 2017-10-02 NOTE — LETTER
10/06/17      Rashaun Duvallmts,  1975, was seen in my office again today for an injury at work on 2017.  He has a hip strain on the left.  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  Overall his left hip pain is improving with physical therapy.  He is unable to return to work at this time.  He will contact me with an update on 10/13/17, and updated recommendations will be provided at that time, and sooner if needed.  He can plan to return to work again as tolerated starting 10/16/17.      Tristan Hanna, , CAQ  Primary Care Sports Medicine  [facility]

## 2017-10-04 ENCOUNTER — HOSPITAL ENCOUNTER (OUTPATIENT)
Dept: PHYSICAL THERAPY | Facility: CLINIC | Age: 42
Setting detail: THERAPIES SERIES
End: 2017-10-04
Attending: FAMILY MEDICINE
Payer: OTHER MISCELLANEOUS

## 2017-10-04 PROCEDURE — 97112 NEUROMUSCULAR REEDUCATION: CPT | Mod: GP | Performed by: PHYSICAL THERAPIST

## 2017-10-04 PROCEDURE — 40000718 ZZHC STATISTIC PT DEPARTMENT ORTHO VISIT: Performed by: PHYSICAL THERAPIST

## 2017-10-04 PROCEDURE — 97110 THERAPEUTIC EXERCISES: CPT | Mod: GP | Performed by: PHYSICAL THERAPIST

## 2017-10-06 NOTE — TELEPHONE ENCOUNTER
Patient states he is getting better; however he requests another week off work with a letter to his employer.     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    The muscle relaxer has been helping him sleep at night.

## 2017-10-06 NOTE — TELEPHONE ENCOUNTER
Patient called today at 12:59. He said that he wanted to update Dr. Monroy. His pain is a lot better, he is walking without an ache at this point, he is also sleeping better at night.  He would like us to call him back at [phone]    Karin CARRILLO (R)

## 2017-10-09 ENCOUNTER — HOSPITAL ENCOUNTER (OUTPATIENT)
Dept: PHYSICAL THERAPY | Facility: CLINIC | Age: 42
Setting detail: THERAPIES SERIES
End: 2017-10-09
Attending: FAMILY MEDICINE
Payer: OTHER MISCELLANEOUS

## 2017-10-09 PROCEDURE — 40000718 ZZHC STATISTIC PT DEPARTMENT ORTHO VISIT: Performed by: PHYSICAL THERAPIST

## 2017-10-09 PROCEDURE — 97112 NEUROMUSCULAR REEDUCATION: CPT | Mod: GP | Performed by: PHYSICAL THERAPIST

## 2017-10-09 PROCEDURE — 97140 MANUAL THERAPY 1/> REGIONS: CPT | Mod: GP | Performed by: PHYSICAL THERAPIST

## 2017-10-13 ENCOUNTER — HOSPITAL ENCOUNTER (OUTPATIENT)
Dept: PHYSICAL THERAPY | Facility: CLINIC | Age: 42
Setting detail: THERAPIES SERIES
End: 2017-10-13
Attending: FAMILY MEDICINE
Payer: OTHER MISCELLANEOUS

## 2017-10-13 PROCEDURE — 40000718 ZZHC STATISTIC PT DEPARTMENT ORTHO VISIT: Performed by: PHYSICAL THERAPIST

## 2017-10-13 PROCEDURE — 97110 THERAPEUTIC EXERCISES: CPT | Mod: GP | Performed by: PHYSICAL THERAPIST

## 2017-10-13 NOTE — TELEPHONE ENCOUNTER
Patient left voicemail requesting a release to work. He states he is resigning his position on Monday but still requires a letter for work comp.

## 2017-10-13 NOTE — TELEPHONE ENCOUNTER
Spoke to patient discussed overall he is doing better.  He has accepted a position at a different company and starts on 10/18.  Work like to be released from all restrictions at this time.    Updated letter provided.  Patient requests that this be faxed to his  rep and emailed to him at numbers previously provided.  Letter sent as requested by patient.  He will f/u with Dr Monroy in ~ 4 weeks for reassessment.    Joe Torres, ATC

## 2017-10-16 ENCOUNTER — HOSPITAL ENCOUNTER (OUTPATIENT)
Dept: PHYSICAL THERAPY | Facility: CLINIC | Age: 42
Setting detail: THERAPIES SERIES
End: 2017-10-16
Attending: FAMILY MEDICINE
Payer: OTHER MISCELLANEOUS

## 2017-10-16 PROCEDURE — 40000718 ZZHC STATISTIC PT DEPARTMENT ORTHO VISIT: Performed by: PHYSICAL THERAPIST

## 2017-10-16 PROCEDURE — 97110 THERAPEUTIC EXERCISES: CPT | Mod: GP | Performed by: PHYSICAL THERAPIST

## 2017-10-18 ENCOUNTER — TELEPHONE (OUTPATIENT)
Dept: FAMILY MEDICINE | Facility: CLINIC | Age: 42
End: 2017-10-18

## 2017-10-18 ENCOUNTER — ALLIED HEALTH/NURSE VISIT (OUTPATIENT)
Dept: FAMILY MEDICINE | Facility: CLINIC | Age: 42
End: 2017-10-18
Payer: COMMERCIAL

## 2017-10-18 VITALS — DIASTOLIC BLOOD PRESSURE: 85 MMHG | SYSTOLIC BLOOD PRESSURE: 135 MMHG

## 2017-10-18 DIAGNOSIS — I10 BENIGN ESSENTIAL HYPERTENSION: Primary | ICD-10-CM

## 2017-10-18 PROCEDURE — 99207 ZZC NO CHARGE NURSE ONLY: CPT | Performed by: NURSE PRACTITIONER

## 2017-10-18 NOTE — TELEPHONE ENCOUNTER
Panel Management Review      Patient has the following on his problem list:     Hypertension   Last three blood pressure readings:  BP Readings from Last 3 Encounters:   09/27/17 (!) 144/102   09/27/17 (!) 144/102   09/22/17 135/80     Blood pressure: FAILED    HTN Guidelines:  Age 18-59 BP range:  Less than 140/90  Age 60-85 with Diabetes:  Less than 140/90  Age 60-85 without Diabetes:  less than 150/90        Composite cancer screening  Chart review shows that this patient is due/due soon for the following None  Summary:    Patient is due/failing the following:   BP    Action needed:   Patient needs nurse only appointment.    Type of outreach:    Phone, spoke to patient.  he has been monitoring his BP at home and gave me an updated reading. He currently changed jobs so does not have current insurance but is going to follow up as soon as his new insurance kicks in. Informed him the BP checks are a no charge visit that he can do at any time. His last reading was 135/85.    Questions for provider review:    None                                                                                                                                    Alison Pavon MA  2:17 PM 10/18/2017       Chart routed to none .

## 2018-01-15 ENCOUNTER — OFFICE VISIT (OUTPATIENT)
Dept: FAMILY MEDICINE | Facility: CLINIC | Age: 43
End: 2018-01-15
Payer: COMMERCIAL

## 2018-01-15 VITALS
SYSTOLIC BLOOD PRESSURE: 136 MMHG | HEIGHT: 77 IN | BODY MASS INDEX: 37.19 KG/M2 | TEMPERATURE: 97.9 F | DIASTOLIC BLOOD PRESSURE: 84 MMHG | WEIGHT: 315 LBS | HEART RATE: 76 BPM

## 2018-01-15 DIAGNOSIS — S89.92XD INJURY OF LEFT LOWER EXTREMITY, SUBSEQUENT ENCOUNTER: ICD-10-CM

## 2018-01-15 DIAGNOSIS — J01.90 ACUTE SINUSITIS WITH SYMPTOMS > 10 DAYS: Primary | ICD-10-CM

## 2018-01-15 DIAGNOSIS — I10 BENIGN ESSENTIAL HYPERTENSION: ICD-10-CM

## 2018-01-15 PROCEDURE — 99213 OFFICE O/P EST LOW 20 MIN: CPT | Performed by: PHYSICIAN ASSISTANT

## 2018-01-15 RX ORDER — LISINOPRIL 20 MG/1
20 TABLET ORAL DAILY
Qty: 30 TABLET | Refills: 3 | Status: SHIPPED | OUTPATIENT
Start: 2018-01-15 | End: 2018-02-13 | Stop reason: DRUGHIGH

## 2018-01-15 RX ORDER — CYCLOBENZAPRINE HCL 10 MG
10 TABLET ORAL
Qty: 30 TABLET | Refills: 1 | Status: SHIPPED | OUTPATIENT
Start: 2018-01-15 | End: 2019-06-09

## 2018-01-15 RX ORDER — CEFDINIR 300 MG/1
600 CAPSULE ORAL DAILY
Qty: 20 CAPSULE | Refills: 0 | Status: SHIPPED | OUTPATIENT
Start: 2018-01-15 | End: 2018-02-13

## 2018-01-15 ASSESSMENT — ENCOUNTER SYMPTOMS
VOMITING: 0
SHORTNESS OF BREATH: 0
FEVER: 0
EYE DISCHARGE: 0
WEAKNESS: 0
HEADACHES: 1
SPUTUM PRODUCTION: 0
STRIDOR: 0
EYE PAIN: 0
NAUSEA: 0
COUGH: 1
CHILLS: 0
WHEEZING: 0
PSYCHIATRIC NEGATIVE: 1
EYE REDNESS: 0
MUSCULOSKELETAL NEGATIVE: 1
SORE THROAT: 0

## 2018-01-15 NOTE — PROGRESS NOTES
HPI      SUBJECTIVE:   Rashaun Camara is a 42 year old male who presents to clinic today for sinus congestion, intermittent fevers on and off for the past month.  He has had a cough and considerable postnasal drainage as well    ENT Symptoms             Symptoms: cc Present Absent Comment   Fever/Chills  x     Fatigue  x     Muscle Aches  x     Eye Irritation   x    Sneezing  x     Nasal Rito/Drg  x     Sinus Pressure/Pain  x     Loss of smell  x     Dental pain   x    Sore Throat   x    Swollen Glands   x    Ear Pain/Fullness   x    Cough   x    Wheeze   x    Chest Pain   x    Shortness of breath   x    Rash   x    Other   x      Symptom duration:  on and off for about one month   Symptom severity:  moderate   Treatments tried:  tylenol severe sinus   Contacts:  none           Problem list and histories reviewed & adjusted, as indicated.  Additional history: as documented    Patient Active Problem List   Diagnosis     Benign essential hypertension     History reviewed. No pertinent surgical history.    Social History   Substance Use Topics     Smoking status: Never Smoker     Smokeless tobacco: Never Used     Alcohol use No     History reviewed. No pertinent family history.      Current Outpatient Prescriptions   Medication Sig Dispense Refill     lisinopril (PRINIVIL/ZESTRIL) 20 MG tablet Take 1 tablet (20 mg) by mouth daily 30 tablet 3     cyclobenzaprine (FLEXERIL) 10 MG tablet Take 1 tablet (10 mg) by mouth nightly as needed for muscle spasms 30 tablet 1     cefdinir (OMNICEF) 300 MG capsule Take 2 capsules (600 mg) by mouth daily 20 capsule 0     Fluticasone Propionate (FLONASE NA) Spray in nostril as needed       [DISCONTINUED] lisinopril (PRINIVIL/ZESTRIL) 20 MG tablet Take 1 tablet (20 mg) by mouth daily (Patient not taking: Reported on 1/15/2018) 30 tablet 3     Allergies   Allergen Reactions     Penicillins Rash     Labs reviewed in EPIC      Reviewed and updated as needed this visit by clinical staff      Reviewed and updated as needed this visit by Provider       Review of Systems   Constitutional: Negative for chills and fever.   HENT: Positive for congestion. Negative for ear discharge, ear pain, hearing loss and sore throat.    Eyes: Negative for pain, discharge and redness.   Respiratory: Positive for cough. Negative for sputum production, shortness of breath, wheezing and stridor.    Cardiovascular: Negative for chest pain.   Gastrointestinal: Negative for nausea and vomiting.   Genitourinary: Negative.    Musculoskeletal: Negative.    Skin: Negative for itching and rash.   Neurological: Positive for headaches. Negative for weakness.   Endo/Heme/Allergies: Negative.    Psychiatric/Behavioral: Negative.          Physical Exam   Constitutional: He is oriented to person, place, and time and well-developed, well-nourished, and in no distress.   HENT:   Head: Normocephalic and atraumatic.   Right Ear: Hearing, tympanic membrane, external ear and ear canal normal.   Left Ear: Hearing, tympanic membrane, external ear and ear canal normal.   Nose: Rhinorrhea present. No septal deviation. Right sinus exhibits maxillary sinus tenderness. Left sinus exhibits maxillary sinus tenderness.   Mouth/Throat: Oropharyngeal exudate, posterior oropharyngeal edema and posterior oropharyngeal erythema present. No tonsillar abscesses.   Eyes: Conjunctivae and EOM are normal. Pupils are equal, round, and reactive to light. Right eye exhibits no discharge. Left eye exhibits no discharge. No scleral icterus.   Neck: Normal range of motion. Neck supple. No thyromegaly present.   Cardiovascular: Normal rate, regular rhythm, normal heart sounds and intact distal pulses.  Exam reveals no gallop and no friction rub.    No murmur heard.  Pulmonary/Chest: Effort normal and breath sounds normal. No respiratory distress. He has no wheezes. He has no rales. He exhibits no tenderness.   Abdominal: Soft. Bowel sounds are normal. He exhibits no  distension and no mass. There is no tenderness. There is no rebound and no guarding.   Musculoskeletal: Normal range of motion. He exhibits no edema or tenderness.   Lymphadenopathy:     He has no cervical adenopathy.   Neurological: He is alert and oriented to person, place, and time. He has normal reflexes. No cranial nerve deficit. He exhibits normal muscle tone. Gait normal. Coordination normal.   Skin: Skin is warm and dry. No rash noted. No erythema.   Psychiatric: Mood, memory, affect and judgment normal.       (J01.90) Acute sinusitis with symptoms > 10 days  (primary encounter diagnosis)  Comment:   Plan: cefdinir (OMNICEF) 300 MG capsule            (I10) Benign essential hypertension  Comment:   Plan: lisinopril (PRINIVIL/ZESTRIL) 20 MG tablet            (S89.92XD) Injury of left lower extremity, subsequent encounter  Comment:   Plan: cyclobenzaprine (FLEXERIL) 10 MG tablet             Follow-up if not improving

## 2018-01-15 NOTE — MR AVS SNAPSHOT
"              After Visit Summary   1/15/2018    Rashaun Camara    MRN: [id number]           Patient Information     Date Of Birth          1975        Visit Information        Provider Department      1/15/2018 2:00 PM Jordy Lo PA-C [facility]        Today's Diagnoses     Acute sinusitis with symptoms > 10 days    -  1    Benign essential hypertension        Injury of left lower extremity, subsequent encounter           Follow-ups after your visit        Follow-up notes from your care team     Return if symptoms worsen or fail to improve.      Who to contact     If you have questions or need follow up information about today's clinic visit or your schedule please contact [facility] directly at [phone].  Normal or non-critical lab and imaging results will be communicated to you by MyChart, letter or phone within 4 business days after the clinic has received the results. If you do not hear from us within 7 days, please contact the clinic through MyChart or phone. If you have a critical or abnormal lab result, we will notify you by phone as soon as possible.  Submit refill requests through [employer] or call your pharmacy and they will forward the refill request to us. Please allow 3 business days for your refill to be completed.          Additional Information About Your Visit        MyChart Information     [employer] lets you send messages to your doctor, view your test results, renew your prescriptions, schedule appointments and more. To sign up, go to [email or web address] . Click on \"Log in\" on the left side of the screen, which will take you to the Welcome page. Then click on \"Sign up Now\" on the right side of the page.     You will be asked to enter the access code listed below, as well as some personal information. Please follow the directions to create your username and password.     Your access code is: [email or web address]  Expires: 4/15/2018  4:01 PM     Your " "access code will  in 90 days. If you need help or a new code, please call your [facility] clinic or [phone].        Care EveryWhere ID     This is your Care EveryWhere ID. This could be used by other organizations to access your [facility] medical records  [id number]        Your Vitals Were     Pulse Temperature Height BMI (Body Mass Index)          76 97.9  F (36.6  C) (Tympanic) 6' 5\" (1.956 m) 45.18 kg/m2         Blood Pressure from Last 3 Encounters:   01/15/18 136/84   10/18/17 135/85   17 (!) 144/102    Weight from Last 3 Encounters:   01/15/18 (!) 381 lb (172.8 kg)   17 (!) 370 lb (167.8 kg)   17 (!) 370 lb 3.2 oz (167.9 kg)              Today, you had the following     No orders found for display         Today's Medication Changes          These changes are accurate as of: 1/15/18  4:01 PM.  If you have any questions, ask your nurse or doctor.               Start taking these medicines.        Dose/Directions    cefdinir 300 MG capsule   Commonly known as:  OMNICEF   Used for:  Acute sinusitis with symptoms > 10 days   Started by:  Jordy Lo PA-C        Dose:  600 mg   Take 2 capsules (600 mg) by mouth daily   Quantity:  20 capsule   Refills:  0            Where to get your medicines      These medications were sent to [facility] [address] [address]     Phone:  [phone]     cefdinir 300 MG capsule    cyclobenzaprine 10 MG tablet    lisinopril 20 MG tablet                Primary Care Provider Office Phone # Fax #    [facility] [phone] [address] [facility] [id number]        Equal Access to Services     WENDI CHAMBERLAIN AH: Tayler pereira Soyannick, waaxda luqadaha, qaybta kaalmada adeegyaivanna, keila farias. So [facility] [phone].    ATENCIÓN: Si habla español, tiene a dillon disposición servicios gratuitos de asistencia " lingüística. Ed al [phone].    We comply with applicable federal civil rights laws and [place] laws. We do not discriminate on the basis of race, color, national origin, age, disability, sex, sexual orientation, or gender identity.            Thank you!     Thank you for choosing [facility]  for your care. Our goal is always to provide you with excellent care. Hearing back from our patients is one way we can continue to improve our services. Please take a few minutes to complete the written survey that you may receive in the mail after your visit with us. Thank you!             Your Updated Medication List - Protect others around you: Learn how to safely use, store and throw away your medicines at www.disposemymeds.org.          This list is accurate as of: 1/15/18  4:01 PM.  Always use your most recent med list.                   Brand Name Dispense Instructions for use Diagnosis    cefdinir 300 MG capsule    OMNICEF    20 capsule    Take 2 capsules (600 mg) by mouth daily    Acute sinusitis with symptoms > 10 days       cyclobenzaprine 10 MG tablet    FLEXERIL    30 tablet    Take 1 tablet (10 mg) by mouth nightly as needed for muscle spasms    Injury of left lower extremity, subsequent encounter       FLONASE NA      Spray in nostril as needed        lisinopril 20 MG tablet    PRINIVIL/ZESTRIL    30 tablet    Take 1 tablet (20 mg) by mouth daily    Benign essential hypertension

## 2018-01-25 NOTE — ADDENDUM NOTE
Encounter addended by: Gloria Puckett, PT on: 1/25/2018  9:19 AM<BR>     Actions taken: Sign clinical note, Flowsheet accepted, Episode resolved

## 2018-01-25 NOTE — PROGRESS NOTES
PHYSICAL THERAPY DISCHARGE NOTE  10/16/17 0800   Signing Clinician's Name / Credentials   Signing clinician's name / credentials Gloria Puckett, PT, DPT, OCS   Session Number   Session Number 6 WC   Progress Note/Recertification   Progress Note Due Date 11/24/17   Adult Goals   PT Ortho Eval Goals 1;2;3;4   Ortho Goal 1   Goal Identifier 1   Goal Description Patient will be able to walk short distances without a limp and minmal pain.   Target Date 10/20/17   Ortho Goal 2   Goal Identifier 2   Goal Description Patient will be able to ascend/descend stairs without difficulty and minimal pain.   Target Date 11/10/17   Ortho Goal 3   Goal Identifier 3   Goal Description Patient will be able to return to work without restriction and pain 2/10 or less at end of work shift.   Target Date 11/24/17   Ortho Goal 4   Goal Identifier 4   Goal Description Patient will be independent and compliant with HEP to aid functional recovery.    Target Date 11/24/17   Subjective Report   Subjective Report Patient reports that the hip/knee keeps getting better. Has times where he is not limping at all.    Objective Measure 1   Objective Measure Gait   Details mild limp today   Treatment Interventions   Interventions Therapeutic Procedure/Exercise;Neuromuscular Re-education;Manual Therapy   Therapeutic Procedure/exercise   Minutes 25   Skilled Intervention strengthening to decrease pain and improve function   Patient Response 25% cues for form, no pain, hip fatigues quickly   Treatment Detail progressed prone hamstring curl to RTB 2 x 10 no resistance, supine SLR 2 x 10 to fatigue cues to keep knee extended, SL Hip abduction 2 x 10 w/cues to extend leg back more to inc glute activation, bridging 2 x 10 cues for breathing, progresed seated LAQ to GTB x 30. seated knee extension stretch x 2 min    Neuromuscular Re-education   Minutes 3   Skilled Intervention balance, proprioception to increase knee stability to improve function and  decrease risk for re-injury   Patient Response 5 sec without UE support   Treatment Detail instructed pt in SLS x 30 sec total, Ue support use as needed with goal of being able to do at least 30 sec without having to touch anything   Education   Learner Patient   Readiness Eager   Method Booklet/handout;Explanation;Demonstration   Response Verbalizes Understanding;Demonstrates Understanding   Plan   Home program above ex   Plan for next session progress ham curl to resistance, progress ex   Comments   Comments need to await further clarification from work comp if more visits are approved   Total Session Time   Timed Code Treatment Minutes 28   Total Treatment Time (sum of timed and untimed services) 28, te2       Please contact me with any questions or concerns.  Thank you for your referral.    Gloria Puckett, PT, DPT, OCS  Physical Therapist, Orthopedic Certified Specialist  [facility]  [phone]

## 2018-02-13 ENCOUNTER — OFFICE VISIT (OUTPATIENT)
Dept: FAMILY MEDICINE | Facility: CLINIC | Age: 43
End: 2018-02-13
Payer: COMMERCIAL

## 2018-02-13 VITALS
DIASTOLIC BLOOD PRESSURE: 88 MMHG | BODY MASS INDEX: 37.19 KG/M2 | TEMPERATURE: 98.3 F | SYSTOLIC BLOOD PRESSURE: 138 MMHG | HEIGHT: 77 IN | HEART RATE: 66 BPM | WEIGHT: 315 LBS

## 2018-02-13 DIAGNOSIS — J01.90 ACUTE SINUSITIS WITH SYMPTOMS > 10 DAYS: ICD-10-CM

## 2018-02-13 DIAGNOSIS — I10 BENIGN ESSENTIAL HYPERTENSION: ICD-10-CM

## 2018-02-13 DIAGNOSIS — Z00.00 ROUTINE GENERAL MEDICAL EXAMINATION AT A HEALTH CARE FACILITY: Primary | ICD-10-CM

## 2018-02-13 LAB
ALBUMIN SERPL-MCNC: 3.8 G/DL (ref 3.4–5)
ALP SERPL-CCNC: 72 U/L (ref 40–150)
ALT SERPL W P-5'-P-CCNC: 52 U/L (ref 0–70)
ANION GAP SERPL CALCULATED.3IONS-SCNC: 6 MMOL/L (ref 3–14)
AST SERPL W P-5'-P-CCNC: 22 U/L (ref 0–45)
BILIRUB SERPL-MCNC: 0.4 MG/DL (ref 0.2–1.3)
BUN SERPL-MCNC: 13 MG/DL (ref 7–30)
CALCIUM SERPL-MCNC: 8.2 MG/DL (ref 8.5–10.1)
CHLORIDE SERPL-SCNC: 104 MMOL/L (ref 94–109)
CHOLEST SERPL-MCNC: 164 MG/DL
CO2 SERPL-SCNC: 27 MMOL/L (ref 20–32)
CREAT SERPL-MCNC: 0.94 MG/DL (ref 0.66–1.25)
GFR SERPL CREATININE-BSD FRML MDRD: 88 ML/MIN/1.7M2
GLUCOSE SERPL-MCNC: 84 MG/DL (ref 70–99)
HDLC SERPL-MCNC: 34 MG/DL
LDLC SERPL CALC-MCNC: 108 MG/DL
NONHDLC SERPL-MCNC: 130 MG/DL
POTASSIUM SERPL-SCNC: 4.2 MMOL/L (ref 3.4–5.3)
PROT SERPL-MCNC: 7.6 G/DL (ref 6.8–8.8)
SODIUM SERPL-SCNC: 137 MMOL/L (ref 133–144)
TRIGL SERPL-MCNC: 109 MG/DL

## 2018-02-13 PROCEDURE — 99213 OFFICE O/P EST LOW 20 MIN: CPT | Mod: 25 | Performed by: PHYSICIAN ASSISTANT

## 2018-02-13 PROCEDURE — 36415 COLL VENOUS BLD VENIPUNCTURE: CPT | Performed by: PHYSICIAN ASSISTANT

## 2018-02-13 PROCEDURE — 80053 COMPREHEN METABOLIC PANEL: CPT | Performed by: PHYSICIAN ASSISTANT

## 2018-02-13 PROCEDURE — 99396 PREV VISIT EST AGE 40-64: CPT | Mod: 25 | Performed by: PHYSICIAN ASSISTANT

## 2018-02-13 PROCEDURE — 80061 LIPID PANEL: CPT | Performed by: PHYSICIAN ASSISTANT

## 2018-02-13 RX ORDER — DOXYCYCLINE 100 MG/1
100 CAPSULE ORAL 2 TIMES DAILY
Qty: 20 CAPSULE | Refills: 0 | Status: SHIPPED | OUTPATIENT
Start: 2018-02-13 | End: 2018-12-03

## 2018-02-13 RX ORDER — LISINOPRIL 40 MG/1
40 TABLET ORAL DAILY
Qty: 90 TABLET | Refills: 3 | Status: SHIPPED | OUTPATIENT
Start: 2018-02-13 | End: 2019-03-06

## 2018-02-13 ASSESSMENT — ENCOUNTER SYMPTOMS
RHINORRHEA: 1
SHORTNESS OF BREATH: 0
COUGH: 0
WEAKNESS: 0
SORE THROAT: 0
DYSURIA: 0
DIZZINESS: 0
ARTHRALGIAS: 0
PARESTHESIAS: 0
HEADACHES: 0
SINUS PRESSURE: 1
FEVER: 0
DIARRHEA: 0
SINUS PAIN: 1
MYALGIAS: 0
HEMATOCHEZIA: 0
HEMATURIA: 0
CHILLS: 0
ABDOMINAL PAIN: 0
EYE PAIN: 0
HEARTBURN: 0
CONSTIPATION: 0
NERVOUS/ANXIOUS: 0
PALPITATIONS: 0
NAUSEA: 0
FREQUENCY: 0
JOINT SWELLING: 0

## 2018-02-13 NOTE — PATIENT INSTRUCTIONS
Preventive Health Recommendations  Male Ages 40 to 49    Yearly exam:             See your health care provider every year in order to  o   Review health changes.   o   Discuss preventive care.    o   Review your medicines if your doctor has prescribed any.    You should be tested each year for STDs (sexually transmitted diseases) if you re at risk.     Have a cholesterol test every 5 years.     Have a colonoscopy (test for colon cancer) if someone in your family has had colon cancer or polyps before age 50.     After age 45, have a diabetes test (fasting glucose). If you are at risk for diabetes, you should have this test every 3 years.      Talk with your health care provider about whether or not a prostate cancer screening test (PSA) is right for you.    Shots: Get a flu shot each year. Get a tetanus shot every 10 years.     Nutrition:    Eat at least 5 servings of fruits and vegetables daily.     Eat whole-grain bread, whole-wheat pasta and brown rice instead of white grains and rice.     Talk to your provider about Calcium and Vitamin D.     Lifestyle    Exercise for at least 150 minutes a week (30 minutes a day, 5 days a week). This will help you control your weight and prevent disease.     Limit alcohol to one drink per day.     No smoking.     Wear sunscreen to prevent skin cancer.     See your dentist every six months for an exam and cleaning.

## 2018-02-13 NOTE — PROGRESS NOTES
SUBJECTIVE:   CC: Rashaun Camara is an 42 year old male who presents for preventative health visit.  He was recently treated for sinusitis and states that his symptoms improved but did not resolve and we discussed utilizing alternative medications to the antibiotic that he was given.  We also discussed his blood pressure and have increased his antihypertensive medication.    Physical   Annual:     Getting at least 3 servings of Calcium per day::  Yes    Bi-annual eye exam::  Yes    Dental care twice a year::  NO    Sleep apnea or symptoms of sleep apnea::  None    Taking medications regularly::  Yes    Medication side effects::  Other    Additional concerns today::  No                    Today's PHQ-2 Score:   PHQ-2 ( 1999 [employer]) 2/13/2018   Q1: Little interest or pleasure in doing things 0   Q2: Feeling down, depressed or hopeless 0   PHQ-2 Score 0   Q1: Little interest or pleasure in doing things Not at all   Q2: Feeling down, depressed or hopeless Not at all   PHQ-2 Score 0       Abuse: Current or Past(Physical, Sexual or Emotional)- No  Do you feel safe in your environment - Yes    Social History   Substance Use Topics     Smoking status: Never Smoker     Smokeless tobacco: Never Used     Alcohol use No     Alcohol Use 2/13/2018   If you drink alcohol, do you typically have greater than 3 drinks per day OR greater than 7 drinks per week?   No       Last PSA: No results found for: PSA    Reviewed orders with patient. Reviewed health maintenance and updated orders accordingly - Yes  Labs reviewed in EPIC  BP Readings from Last 3 Encounters:   02/13/18 138/88   01/15/18 136/84   10/18/17 135/85    Wt Readings from Last 3 Encounters:   02/13/18 (!) 377 lb (171 kg)   01/15/18 (!) 381 lb (172.8 kg)   09/27/17 (!) 370 lb (167.8 kg)                  Patient Active Problem List   Diagnosis     Benign essential hypertension     History reviewed. No pertinent surgical history.    Social History   Substance Use Topics      Smoking status: Never Smoker     Smokeless tobacco: Never Used     Alcohol use No     History reviewed. No pertinent family history.      Current Outpatient Prescriptions   Medication Sig Dispense Refill     lisinopril (PRINIVIL/ZESTRIL) 40 MG tablet Take 1 tablet (40 mg) by mouth daily 90 tablet 3     doxycycline (VIBRAMYCIN) 100 MG capsule Take 1 capsule (100 mg) by mouth 2 times daily 20 capsule 0     cyclobenzaprine (FLEXERIL) 10 MG tablet Take 1 tablet (10 mg) by mouth nightly as needed for muscle spasms 30 tablet 1     Fluticasone Propionate (FLONASE NA) Spray in nostril as needed       [DISCONTINUED] lisinopril (PRINIVIL/ZESTRIL) 20 MG tablet Take 1 tablet (20 mg) by mouth daily 30 tablet 3     Allergies   Allergen Reactions     Penicillins Rash     Recent Labs   Lab Test  09/27/17   0854  09/15/16   1955   LDL  108*   --    HDL  38*   --    TRIG  142   --    ALT   --   30   CR  0.99  0.86   GFRESTIMATED  83  >90  Non  GFR Calc     GFRESTBLACK  >90  >90  African American GFR Calc     POTASSIUM  4.4  3.7   TSH  3.16   --         Reviewed and updated as needed this visit by clinical staff  Tobacco  Allergies  Med Hx  Surg Hx  Fam Hx  Soc Hx        Reviewed and updated as needed this visit by Provider        History reviewed. No pertinent past medical history.   History reviewed. No pertinent surgical history.    Review of Systems   Constitutional: Negative for chills and fever.   HENT: Positive for postnasal drip, rhinorrhea, sinus pain and sinus pressure. Negative for congestion, ear pain, hearing loss and sore throat.    Eyes: Negative for pain and visual disturbance.   Respiratory: Negative for cough and shortness of breath.    Cardiovascular: Negative for chest pain, palpitations and peripheral edema.   Gastrointestinal: Negative for abdominal pain, constipation, diarrhea, heartburn, hematochezia and nausea.   Genitourinary: Negative for discharge, dysuria, frequency, genital sores,  "hematuria, impotence and urgency.   Musculoskeletal: Negative for arthralgias, joint swelling and myalgias.   Skin: Negative for rash.   Neurological: Negative for dizziness, weakness, headaches and paresthesias.   Psychiatric/Behavioral: Negative for mood changes. The patient is not nervous/anxious.      C: NEGATIVE for fever, chills, change in weight  I: NEGATIVE for worrisome rashes, moles or lesions  E: NEGATIVE for vision changes or irritation    R: NEGATIVE for significant cough or SOB  B: NEGATIVE for masses, tenderness or discharge  CV: NEGATIVE for chest pain, palpitations or peripheral edema  GI: NEGATIVE for nausea, abdominal pain, heartburn, or change in bowel habits   male: negative for dysuria, hematuria, decreased urinary stream, erectile dysfunction, urethral discharge  M: NEGATIVE for significant arthralgias or myalgia  N: NEGATIVE for weakness, dizziness or paresthesias  P: NEGATIVE for changes in mood or affect    OBJECTIVE:   /88  Pulse 66  Temp 98.3  F (36.8  C) (Tympanic)  Ht 6' 5\" (1.956 m)  Wt (!) 377 lb (171 kg)  BMI 44.71 kg/m2    Physical Exam   Constitutional: He is oriented to person, place, and time. He appears well-developed and well-nourished. No distress.   HENT:   Right Ear: Tympanic membrane and external ear normal.   Left Ear: Tympanic membrane and external ear normal.   Nose: Nose normal.   Mouth/Throat: No oral lesions. Oropharyngeal exudate and posterior oropharyngeal erythema present.   Eyes: Conjunctivae are normal. Pupils are equal, round, and reactive to light. Right eye exhibits no discharge. Left eye exhibits no discharge.   Neck: Neck supple. No tracheal deviation present. No thyromegaly present.   Cardiovascular: Normal rate, regular rhythm, S1 normal, S2 normal, normal heart sounds and normal pulses.  Exam reveals no S3 and no S4.    No murmur heard.  Pulmonary/Chest: Effort normal and breath sounds normal. No respiratory distress. He has no wheezes. He " "has no rales.   Abdominal: Soft. Bowel sounds are normal. He exhibits no mass. There is no hepatosplenomegaly. There is no tenderness.   Musculoskeletal: Normal range of motion. He exhibits no edema or deformity.   Lymphadenopathy:     He has no cervical adenopathy.   Neurological: He is alert and oriented to person, place, and time. He has normal strength and normal reflexes. He exhibits normal muscle tone.   Skin: Skin is warm and dry. No lesion and no rash noted.   Psychiatric: He has a normal mood and affect. His speech is normal. Judgment and thought content normal. Cognition and memory are normal.     Answers for HPI/ROS submitted by the patient on 2/13/2018   PHQ-2 Score: 0      ASSESSMENT/PLAN:       ICD-10-CM    1. Routine general medical examination at a health care facility Z00.00    2. Benign essential hypertension I10 lisinopril (PRINIVIL/ZESTRIL) 40 MG tablet     Comprehensive metabolic panel (BMP + Alb, Alk Phos, ALT, AST, Total. Bili, TP)     Lipid panel reflex to direct LDL Fasting   3. Acute sinusitis with symptoms > 10 days J01.90 doxycycline (VIBRAMYCIN) 100 MG capsule       COUNSELING:   Reviewed preventive health counseling, as reflected in patient instructions       Regular exercise       Healthy diet/nutrition       Vision screening         reports that he has never smoked. He has never used smokeless tobacco.    Estimated body mass index is 44.71 kg/(m^2) as calculated from the following:    Height as of this encounter: 6' 5\" (1.956 m).    Weight as of this encounter: 377 lb (171 kg).   Weight management plan: Discussed healthy diet and exercise guidelines and patient will follow up in 12 months in clinic to re-evaluate.    Counseling Resources:  ATP IV Guidelines  Pooled Cohorts Equation Calculator  FRAX Risk Assessment  ICSI Preventive Guidelines  Dietary Guidelines for Americans, 2010  USDA's MyPlate  ASA Prophylaxis  Lung CA Screening    Jordy Lo PA-C  [facility] " [facility]

## 2018-02-13 NOTE — MR AVS SNAPSHOT
After Visit Summary   2/13/2018    Rashaun Camara    MRN: [id number]           Patient Information     Date Of Birth          1975        Visit Information        Provider Department      2/13/2018 8:00 AM Jordy Lo PA-C [facility]        Today's Diagnoses     Routine general medical examination at a health care facility    -  1    Benign essential hypertension        Acute sinusitis with symptoms > 10 days          Care Instructions    Preventive Health Recommendations  Male Ages 40 to 49    Yearly exam:             See your health care provider every year in order to  o   Review health changes.   o   Discuss preventive care.    o   Review your medicines if your doctor has prescribed any.    You should be tested each year for STDs (sexually transmitted diseases) if you re at risk.     Have a cholesterol test every 5 years.     Have a colonoscopy (test for colon cancer) if someone in your family has had colon cancer or polyps before age 50.     After age 45, have a diabetes test (fasting glucose). If you are at risk for diabetes, you should have this test every 3 years.      Talk with your health care provider about whether or not a prostate cancer screening test (PSA) is right for you.    Shots: Get a flu shot each year. Get a tetanus shot every 10 years.     Nutrition:    Eat at least 5 servings of fruits and vegetables daily.     Eat whole-grain bread, whole-wheat pasta and brown rice instead of white grains and rice.     Talk to your provider about Calcium and Vitamin D.     Lifestyle    Exercise for at least 150 minutes a week (30 minutes a day, 5 days a week). This will help you control your weight and prevent disease.     Limit alcohol to one drink per day.     No smoking.     Wear sunscreen to prevent skin cancer.     See your dentist every six months for an exam and cleaning.              Follow-ups after your visit        Follow-up notes from your care team      "Return in about 6 months (around 2018).      Who to contact     If you have questions or need follow up information about today's clinic visit or your schedule please contact [facility] directly at [phone].  Normal or non-critical lab and imaging results will be communicated to you by MyChart, letter or phone within 4 business days after the clinic has received the results. If you do not hear from us within 7 days, please contact the clinic through 3DVistahart or phone. If you have a critical or abnormal lab result, we will notify you by phone as soon as possible.  Submit refill requests through Ohanae or call your pharmacy and they will forward the refill request to us. Please allow 3 business days for your refill to be completed.          Additional Information About Your Visit        [facility] Information     Ohanae lets you send messages to your doctor, view your test results, renew your prescriptions, schedule appointments and more. To sign up, go to [email or web address] . Click on \"Log in\" on the left side of the screen, which will take you to the Welcome page. Then click on \"Sign up Now\" on the right side of the page.     You will be asked to enter the access code listed below, as well as some personal information. Please follow the directions to create your username and password.     Your access code is: [email or web address]  Expires: 4/15/2018  4:01 PM     Your access code will  in 90 days. If you need help or a new code, please call your [facility] or [phone].        Care EveryWhere ID     This is your Care EveryWhere ID. This could be used by other organizations to access your [place] medical records  [id number]        Your Vitals Were     Pulse Temperature Height BMI (Body Mass Index)          66 98.3  F (36.8  C) (Tympanic) 6' 5\" (1.956 m) 44.71 kg/m2         Blood Pressure from Last 3 Encounters:   18 138/88   01/15/18 136/84   10/18/17 135/85    Weight from " Last 3 Encounters:   02/13/18 (!) 377 lb (171 kg)   01/15/18 (!) 381 lb (172.8 kg)   09/27/17 (!) 370 lb (167.8 kg)              We Performed the Following     Comprehensive metabolic panel (BMP + Alb, Alk Phos, ALT, AST, Total. Bili, TP)     Lipid panel reflex to direct LDL Fasting          Today's Medication Changes          These changes are accurate as of 2/13/18  8:50 AM.  If you have any questions, ask your nurse or doctor.               Start taking these medicines.        Dose/Directions    doxycycline 100 MG capsule   Commonly known as:  VIBRAMYCIN   Used for:  Acute sinusitis with symptoms > 10 days   Started by:  Jordy Lo PA-C        Dose:  100 mg   Take 1 capsule (100 mg) by mouth 2 times daily   Quantity:  20 capsule   Refills:  0         These medicines have changed or have updated prescriptions.        Dose/Directions    lisinopril 40 MG tablet   Commonly known as:  PRINIVIL/ZESTRIL   This may have changed:    - medication strength  - how much to take   Used for:  Benign essential hypertension   Changed by:  Jordy Lo PA-C        Dose:  40 mg   Take 1 tablet (40 mg) by mouth daily   Quantity:  90 tablet   Refills:  3         Stop taking these medicines if you haven't already. Please contact your care team if you have questions.     cefdinir 300 MG capsule   Commonly known as:  OMNICEF   Stopped by:  Jordy Lo PA-C                Where to get your medicines      These medications were sent to [facility] [address] [address]     Phone:  [phone]     doxycycline 100 MG capsule    lisinopril 40 MG tablet                Primary Care Provider Office Phone # Fax #    [facility] [phone] [address] [facility] [address]        Equal Access to Services     WENDI CHAMBERLAIN AH: Tayler Christianson, kaushik wright, keila miranda  kathi martinezvishnudeidra hyltonaaelida ah. So [place] [phone].    ATENCIÓN: Si daniellela dave, tiene a dlilon disposición servicios gratuitos de asistencia lingüística. Ed al [phone].    We comply with applicable federal civil rights laws and [place] laws. We do not discriminate on the basis of race, color, national origin, age, disability, sex, sexual orientation, or gender identity.            Thank you!     Thank you for choosing [facility]  for your care. Our goal is always to provide you with excellent care. Hearing back from our patients is one way we can continue to improve our services. Please take a few minutes to complete the written survey that you may receive in the mail after your visit with us. Thank you!             Your Updated Medication List - Protect others around you: Learn how to safely use, store and throw away your medicines at www.disposemymeds.org.          This list is accurate as of 2/13/18  8:50 AM.  Always use your most recent med list.                   Brand Name Dispense Instructions for use Diagnosis    cyclobenzaprine 10 MG tablet    FLEXERIL    30 tablet    Take 1 tablet (10 mg) by mouth nightly as needed for muscle spasms    Injury of left lower extremity, subsequent encounter       doxycycline 100 MG capsule    VIBRAMYCIN    20 capsule    Take 1 capsule (100 mg) by mouth 2 times daily    Acute sinusitis with symptoms > 10 days       FLONASE NA      Spray in nostril as needed        lisinopril 40 MG tablet    PRINIVIL/ZESTRIL    90 tablet    Take 1 tablet (40 mg) by mouth daily    Benign essential hypertension

## 2018-02-13 NOTE — NURSING NOTE
"Chief Complaint   Patient presents with     Physical       Initial BP (!) 145/102 (BP Location: Right arm, Patient Position: Chair, Cuff Size: Adult Large)  Pulse 66  Temp 98.3  F (36.8  C) (Tympanic)  Ht 6' 5\" (1.956 m)  Wt (!) 377 lb (171 kg)  BMI 44.71 kg/m2 Estimated body mass index is 44.71 kg/(m^2) as calculated from the following:    Height as of this encounter: 6' 5\" (1.956 m).    Weight as of this encounter: 377 lb (171 kg).      Health Maintenance that is potentially due pending provider review:  NONE        Is there anyone who you would like to be able to receive your results? No  If yes have patient fill out JASON      "

## 2018-02-27 ENCOUNTER — ALLIED HEALTH/NURSE VISIT (OUTPATIENT)
Dept: FAMILY MEDICINE | Facility: CLINIC | Age: 43
End: 2018-02-27
Payer: COMMERCIAL

## 2018-02-27 VITALS — SYSTOLIC BLOOD PRESSURE: 138 MMHG | DIASTOLIC BLOOD PRESSURE: 78 MMHG

## 2018-02-27 DIAGNOSIS — I10 BENIGN ESSENTIAL HYPERTENSION: Primary | ICD-10-CM

## 2018-02-27 PROCEDURE — 99207 ZZC NO CHARGE NURSE ONLY: CPT

## 2018-02-27 NOTE — MR AVS SNAPSHOT
"              After Visit Summary   2018    Rashaun Camara    MRN: [id number]           Patient Information     Date Of Birth          1975        Visit Information        Provider Department      2018 11:00 AM Clinic, [facility]        Today's Diagnoses     Benign essential hypertension    -  1       Follow-ups after your visit        Who to contact     If you have questions or need follow up information about today's clinic visit or your schedule please contact [facility] directly at [phone].  Normal or non-critical lab and imaging results will be communicated to you by [facility] [facility], letter or phone within 4 business days after the clinic has received the results. If you do not hear from us within 7 days, please contact the clinic through [employer] or phone. If you have a critical or abnormal lab result, we will notify you by phone as soon as possible.  Submit refill requests through Bazari or call your pharmacy and they will forward the refill request to us. Please allow 3 business days for your refill to be completed.          Additional Information About Your Visit        MyChart Information     Bazari lets you send messages to your doctor, view your test results, renew your prescriptions, schedule appointments and more. To sign up, go to [email or web address] . Click on \"Log in\" on the left side of the screen, which will take you to the Welcome page. Then click on \"Sign up Now\" on the right side of the page.     You will be asked to enter the access code listed below, as well as some personal information. Please follow the directions to create your username and password.     Your access code is: [email or web address]  Expires: 4/15/2018  4:01 PM     Your access code will  in 90 days. If you need help or a new code, please call your [facility] or [phone].        Care EveryWhere ID     This is your Care EveryWhere ID. This could be " used by other organizations to access your [place] medical records  [id number]         Blood Pressure from Last 3 Encounters:   02/27/18 138/78   02/13/18 138/88   01/15/18 136/84    Weight from Last 3 Encounters:   02/13/18 (!) 377 lb (171 kg)   01/15/18 (!) 381 lb (172.8 kg)   09/27/17 (!) 370 lb (167.8 kg)              Today, you had the following     No orders found for display       Primary Care Provider Office Phone # Fax #    [facility] [phone] [address] [facility] [address]        Equal Access to Services     WENDI CHAMBERLAIN : Tayler Christianson, kaushik wright, nishi kaalmaivanna melendrez, keila farias. So [facility] [phone].    ATENCIÓN: Si habla español, tiene a dillon disposición servicios gratuitos de asistencia lingüística. Llame al [phone].    We comply with applicable federal civil rights laws and [place] laws. We do not discriminate on the basis of race, color, national origin, age, disability, sex, sexual orientation, or gender identity.            Thank you!     Thank you for choosing [facility]  for your care. Our goal is always to provide you with excellent care. Hearing back from our patients is one way we can continue to improve our services. Please take a few minutes to complete the written survey that you may receive in the mail after your visit with us. Thank you!             Your Updated Medication List - Protect others around you: Learn how to safely use, store and throw away your medicines at www.disposemymeds.org.          This list is accurate as of 2/27/18 11:15 AM.  Always use your most recent med list.                   Brand Name Dispense Instructions for use Diagnosis    cyclobenzaprine 10 MG tablet    FLEXERIL    30 tablet    Take 1 tablet (10 mg) by mouth nightly as needed for muscle spasms    Injury of left lower extremity, subsequent encounter       doxycycline 100 MG  capsule    VIBRAMYCIN    20 capsule    Take 1 capsule (100 mg) by mouth 2 times daily    Acute sinusitis with symptoms > 10 days       FLONASE NA      Spray in nostril as needed        lisinopril 40 MG tablet    PRINIVIL/ZESTRIL    90 tablet    Take 1 tablet (40 mg) by mouth daily    Benign essential hypertension

## 2018-03-12 ENCOUNTER — ALLIED HEALTH/NURSE VISIT (OUTPATIENT)
Dept: FAMILY MEDICINE | Facility: CLINIC | Age: 43
End: 2018-03-12
Payer: COMMERCIAL

## 2018-03-12 VITALS — BODY MASS INDEX: 43.05 KG/M2 | WEIGHT: 315 LBS

## 2018-03-12 DIAGNOSIS — I10 BENIGN ESSENTIAL HYPERTENSION: Primary | ICD-10-CM

## 2018-03-12 PROCEDURE — 99207 ZZC NO CHARGE NURSE ONLY: CPT

## 2018-03-12 NOTE — MR AVS SNAPSHOT
"              After Visit Summary   3/12/2018    Rashaun Camara    MRN: [id number]           Patient Information     Date Of Birth          1975        Visit Information        Provider Department      3/12/2018 9:15 AM [place] CHOLO ALFARO/LPN [facility]        Today's Diagnoses     Benign essential hypertension    -  1       Follow-ups after your visit        Your next 10 appointments already scheduled     Mar 12, 2018  9:15 AM CDT   Nurse Only with [place] [facility]O [facility]    [address] [place] [address] [phone]              Who to contact     If you have questions or need follow up information about today's clinic visit or your schedule please contact [facility] directly at [phone].  Normal or non-critical lab and imaging results will be communicated to you by MyChart, letter or phone within 4 business days after the clinic has received the results. If you do not hear from us within 7 days, please contact the clinic through MyChart or phone. If you have a critical or abnormal lab result, we will notify you by phone as soon as possible.  Submit refill requests through Inkshares or call your pharmacy and they will forward the refill request to us. Please allow 3 business days for your refill to be completed.          Additional Information About Your Visit        MyChart Information     Inkshares lets you send messages to your doctor, view your test results, renew your prescriptions, schedule appointments and more. To sign up, go to [email or web address] . Click on \"Log in\" on the left side of the screen, which will take you to the Welcome page. Then click on \"Sign up Now\" on the right side of the page.     You will be asked to enter the access code listed below, as well as some personal information. Please follow the directions to create your username and password.     Your access code is: " [email or web address]  Expires: 4/15/2018  5:01 PM     Your access code will  in 90 days. If you need help or a new code, please call your [facility] or [phone].        Care EveryWhere ID     This is your Care EveryWhere ID. This could be used by other organizations to access your [place] medical records  [id number]        Your Vitals Were     BMI (Body Mass Index)                   43.05 kg/m2            Blood Pressure from Last 3 Encounters:   18 138/78   18 138/88   01/15/18 136/84    Weight from Last 3 Encounters:   18 (!) 363 lb (164.7 kg)   18 (!) 377 lb (171 kg)   01/15/18 (!) 381 lb (172.8 kg)              Today, you had the following     No orders found for display       Primary Care Provider Office Phone # Fax #    [facility] [phone] [address] [facility] [address]        Equal Access to Services     [facility] : Hadii aad ku hadasho Soomaali, waaxda luqadaha, qaybta kaalmada adeegyaivanna, keila martin . So [facility] [phone].    ATENCIÓN: Si habla español, tiene a dillon disposición servicios gratuitos de asistencia lingüística. Llame al [phone].    We comply with applicable federal civil rights laws and [place] laws. We do not discriminate on the basis of race, color, national origin, age, disability, sex, sexual orientation, or gender identity.            Thank you!     Thank you for choosing [facility]  for your care. Our goal is always to provide you with excellent care. Hearing back from our patients is one way we can continue to improve our services. Please take a few minutes to complete the written survey that you may receive in the mail after your visit with us. Thank you!             Your Updated Medication List - Protect others around you: Learn how to safely use, store and throw away your medicines at www.disposemymeds.org.          This list is accurate as of 3/12/18   9:08 AM.  Always use your most recent med list.                   Brand Name Dispense Instructions for use Diagnosis    cyclobenzaprine 10 MG tablet    FLEXERIL    30 tablet    Take 1 tablet (10 mg) by mouth nightly as needed for muscle spasms    Injury of left lower extremity, subsequent encounter       doxycycline 100 MG capsule    VIBRAMYCIN    20 capsule    Take 1 capsule (100 mg) by mouth 2 times daily    Acute sinusitis with symptoms > 10 days       FLONASE NA      Spray in nostril as needed        lisinopril 40 MG tablet    PRINIVIL/ZESTRIL    90 tablet    Take 1 tablet (40 mg) by mouth daily    Benign essential hypertension

## 2018-03-26 ENCOUNTER — ALLIED HEALTH/NURSE VISIT (OUTPATIENT)
Dept: FAMILY MEDICINE | Facility: CLINIC | Age: 43
End: 2018-03-26
Payer: COMMERCIAL

## 2018-03-26 VITALS — BODY MASS INDEX: 37.19 KG/M2 | WEIGHT: 315 LBS | HEIGHT: 77 IN

## 2018-03-26 DIAGNOSIS — E66.9 OBESITY: Primary | ICD-10-CM

## 2018-03-26 PROCEDURE — 99207 ZZC NO CHARGE NURSE ONLY: CPT

## 2018-04-16 ENCOUNTER — ALLIED HEALTH/NURSE VISIT (OUTPATIENT)
Dept: FAMILY MEDICINE | Facility: CLINIC | Age: 43
End: 2018-04-16
Payer: COMMERCIAL

## 2018-04-16 VITALS — WEIGHT: 315 LBS | BODY MASS INDEX: 41.5 KG/M2

## 2018-04-16 DIAGNOSIS — E66.9 OBESITY: Primary | ICD-10-CM

## 2018-04-16 PROCEDURE — 99207 ZZC NO CHARGE NURSE ONLY: CPT

## 2018-05-07 ENCOUNTER — ALLIED HEALTH/NURSE VISIT (OUTPATIENT)
Dept: FAMILY MEDICINE | Facility: CLINIC | Age: 43
End: 2018-05-07
Payer: COMMERCIAL

## 2018-05-07 VITALS — HEIGHT: 77 IN | BODY MASS INDEX: 37.19 KG/M2 | WEIGHT: 315 LBS

## 2018-05-07 DIAGNOSIS — E66.9 OBESITY: Primary | ICD-10-CM

## 2018-05-07 PROCEDURE — 99207 ZZC NO CHARGE NURSE ONLY: CPT

## 2018-08-14 ENCOUNTER — APPOINTMENT (OUTPATIENT)
Dept: CT IMAGING | Facility: CLINIC | Age: 43
End: 2018-08-14
Attending: EMERGENCY MEDICINE
Payer: COMMERCIAL

## 2018-08-14 ENCOUNTER — NURSE TRIAGE (OUTPATIENT)
Dept: NURSING | Facility: CLINIC | Age: 43
End: 2018-08-14

## 2018-08-14 ENCOUNTER — HOSPITAL ENCOUNTER (EMERGENCY)
Facility: CLINIC | Age: 43
Discharge: HOME | End: 2018-08-14
Attending: EMERGENCY MEDICINE | Admitting: EMERGENCY MEDICINE
Payer: COMMERCIAL

## 2018-08-14 VITALS
OXYGEN SATURATION: 98 % | RESPIRATION RATE: 18 BRPM | TEMPERATURE: 99.2 F | BODY MASS INDEX: 37.71 KG/M2 | HEART RATE: 71 BPM | DIASTOLIC BLOOD PRESSURE: 87 MMHG | WEIGHT: 315 LBS | SYSTOLIC BLOOD PRESSURE: 148 MMHG

## 2018-08-14 DIAGNOSIS — K57.32 DIVERTICULITIS OF COLON: ICD-10-CM

## 2018-08-14 DIAGNOSIS — R10.32 ABDOMINAL PAIN, LEFT LOWER QUADRANT: ICD-10-CM

## 2018-08-14 DIAGNOSIS — K80.20 GALLSTONES: ICD-10-CM

## 2018-08-14 LAB
ALBUMIN UR-MCNC: NEGATIVE MG/DL
ANION GAP SERPL CALCULATED.3IONS-SCNC: 8 MMOL/L (ref 3–14)
APPEARANCE UR: CLEAR
BASOPHILS # BLD AUTO: 0 10E9/L (ref 0–0.2)
BASOPHILS NFR BLD AUTO: 0.2 %
BILIRUB UR QL STRIP: NEGATIVE
BUN SERPL-MCNC: 17 MG/DL (ref 7–30)
CALCIUM SERPL-MCNC: 8.4 MG/DL (ref 8.5–10.1)
CHLORIDE SERPL-SCNC: 107 MMOL/L (ref 94–109)
CO2 SERPL-SCNC: 23 MMOL/L (ref 20–32)
COLOR UR AUTO: YELLOW
CREAT SERPL-MCNC: 0.94 MG/DL (ref 0.66–1.25)
DIFFERENTIAL METHOD BLD: ABNORMAL
EOSINOPHIL # BLD AUTO: 0 10E9/L (ref 0–0.7)
EOSINOPHIL NFR BLD AUTO: 0.3 %
ERYTHROCYTE [DISTWIDTH] IN BLOOD BY AUTOMATED COUNT: 13.1 % (ref 10–15)
GFR SERPL CREATININE-BSD FRML MDRD: 88 ML/MIN/1.7M2
GLUCOSE SERPL-MCNC: 101 MG/DL (ref 70–99)
GLUCOSE UR STRIP-MCNC: NEGATIVE MG/DL
HCT VFR BLD AUTO: 42.9 % (ref 40–53)
HGB BLD-MCNC: 13.7 G/DL (ref 13.3–17.7)
HGB UR QL STRIP: NEGATIVE
IMM GRANULOCYTES # BLD: 0 10E9/L (ref 0–0.4)
IMM GRANULOCYTES NFR BLD: 0.3 %
KETONES UR STRIP-MCNC: 20 MG/DL
LEUKOCYTE ESTERASE UR QL STRIP: NEGATIVE
LYMPHOCYTES # BLD AUTO: 1.2 10E9/L (ref 0.8–5.3)
LYMPHOCYTES NFR BLD AUTO: 9.7 %
MCH RBC QN AUTO: 27.1 PG (ref 26.5–33)
MCHC RBC AUTO-ENTMCNC: 31.9 G/DL (ref 31.5–36.5)
MCV RBC AUTO: 85 FL (ref 78–100)
MONOCYTES # BLD AUTO: 1.2 10E9/L (ref 0–1.3)
MONOCYTES NFR BLD AUTO: 9.4 %
NEUTROPHILS # BLD AUTO: 10.3 10E9/L (ref 1.6–8.3)
NEUTROPHILS NFR BLD AUTO: 80.1 %
NITRATE UR QL: NEGATIVE
NRBC # BLD AUTO: 0 10*3/UL
NRBC BLD AUTO-RTO: 0 /100
PH UR STRIP: 7 PH (ref 5–7)
PLATELET # BLD AUTO: 156 10E9/L (ref 150–450)
POTASSIUM SERPL-SCNC: 3.6 MMOL/L (ref 3.4–5.3)
RBC # BLD AUTO: 5.06 10E12/L (ref 4.4–5.9)
SODIUM SERPL-SCNC: 138 MMOL/L (ref 133–144)
SOURCE: ABNORMAL
SP GR UR STRIP: 1.03 (ref 1–1.03)
UROBILINOGEN UR STRIP-MCNC: 2 MG/DL (ref 0–2)
WBC # BLD AUTO: 12.8 10E9/L (ref 4–11)

## 2018-08-14 PROCEDURE — 96361 HYDRATE IV INFUSION ADD-ON: CPT | Performed by: EMERGENCY MEDICINE

## 2018-08-14 PROCEDURE — 96376 TX/PRO/DX INJ SAME DRUG ADON: CPT | Performed by: EMERGENCY MEDICINE

## 2018-08-14 PROCEDURE — 99285 EMERGENCY DEPT VISIT HI MDM: CPT | Mod: Z6 | Performed by: EMERGENCY MEDICINE

## 2018-08-14 PROCEDURE — 25000128 H RX IP 250 OP 636: Performed by: EMERGENCY MEDICINE

## 2018-08-14 PROCEDURE — 74176 CT ABD & PELVIS W/O CONTRAST: CPT

## 2018-08-14 PROCEDURE — 99285 EMERGENCY DEPT VISIT HI MDM: CPT | Mod: 25 | Performed by: EMERGENCY MEDICINE

## 2018-08-14 PROCEDURE — 96374 THER/PROPH/DIAG INJ IV PUSH: CPT | Performed by: EMERGENCY MEDICINE

## 2018-08-14 PROCEDURE — 80048 BASIC METABOLIC PNL TOTAL CA: CPT | Performed by: EMERGENCY MEDICINE

## 2018-08-14 PROCEDURE — 96375 TX/PRO/DX INJ NEW DRUG ADDON: CPT | Performed by: EMERGENCY MEDICINE

## 2018-08-14 PROCEDURE — 85025 COMPLETE CBC W/AUTO DIFF WBC: CPT | Performed by: EMERGENCY MEDICINE

## 2018-08-14 PROCEDURE — 81003 URINALYSIS AUTO W/O SCOPE: CPT | Performed by: EMERGENCY MEDICINE

## 2018-08-14 RX ORDER — ONDANSETRON 2 MG/ML
4 INJECTION INTRAMUSCULAR; INTRAVENOUS
Status: COMPLETED | OUTPATIENT
Start: 2018-08-14 | End: 2018-08-14

## 2018-08-14 RX ORDER — CIPROFLOXACIN 500 MG/1
500 TABLET, FILM COATED ORAL 2 TIMES DAILY
Qty: 14 TABLET | Refills: 0 | Status: SHIPPED | OUTPATIENT
Start: 2018-08-14 | End: 2018-12-03

## 2018-08-14 RX ORDER — HYDROCODONE BITARTRATE AND ACETAMINOPHEN 5; 325 MG/1; MG/1
1 TABLET ORAL EVERY 6 HOURS PRN
Qty: 10 TABLET | Refills: 0 | Status: SHIPPED | OUTPATIENT
Start: 2018-08-14 | End: 2018-12-03

## 2018-08-14 RX ORDER — METRONIDAZOLE 500 MG/1
500 TABLET ORAL 3 TIMES DAILY
Qty: 21 TABLET | Refills: 0 | Status: SHIPPED | OUTPATIENT
Start: 2018-08-14 | End: 2018-12-03

## 2018-08-14 RX ORDER — HYDROMORPHONE HYDROCHLORIDE 1 MG/ML
0.5 INJECTION, SOLUTION INTRAMUSCULAR; INTRAVENOUS; SUBCUTANEOUS
Status: DISCONTINUED | OUTPATIENT
Start: 2018-08-14 | End: 2018-08-15 | Stop reason: HOSPADM

## 2018-08-14 RX ADMIN — Medication 0.5 MG: at 20:59

## 2018-08-14 RX ADMIN — SODIUM CHLORIDE 500 ML: 9 INJECTION, SOLUTION INTRAVENOUS at 20:55

## 2018-08-14 RX ADMIN — Medication 0.5 MG: at 22:10

## 2018-08-14 RX ADMIN — ONDANSETRON 4 MG: 2 INJECTION INTRAMUSCULAR; INTRAVENOUS at 20:55

## 2018-08-14 ASSESSMENT — ENCOUNTER SYMPTOMS
ENDOCRINE NEGATIVE: 1
PSYCHIATRIC NEGATIVE: 1
ALLERGIC/IMMUNOLOGIC NEGATIVE: 1
HEMATOLOGIC/LYMPHATIC NEGATIVE: 1
DYSURIA: 1
ABDOMINAL PAIN: 1
RESPIRATORY NEGATIVE: 1
NEUROLOGICAL NEGATIVE: 1
EYES NEGATIVE: 1
CONSTITUTIONAL NEGATIVE: 1
CARDIOVASCULAR NEGATIVE: 1
MUSCULOSKELETAL NEGATIVE: 1

## 2018-08-14 NOTE — ED AVS SNAPSHOT
[facility] Emergency Department    [address] [facility] [address]    Phone:  [phone]    Fax:  [phone]                                       Rashaun Camara   MRN: [id number]    Department:  [facility] Emergency Department   Date of Visit:  8/14/2018           Patient Information     Date Of Birth          1975        Your diagnoses for this visit were:     Abdominal pain, left lower quadrant Likely due to low descending colonic diverticulitis    Diverticulitis of colon CT imaging shows lower descending colonic diverticulitis without abscess or perforation    Gallstones CT imaging today also did show they have some stones in her gallbladder.  The location of your pain is not due to gallstones but more likely due to diverticulitis       You were seen by Fredy Baca MD.      Follow-up Information     Follow up with [facility] Emergency Department.    Specialty:  EMERGENCY MEDICINE    Why:  Your symptoms are due to diverticulitis based on CT imaging.  Take antibiotics as prescribed.  Take pain medication as prescribed.  Return to the ED for care if you have fever, vomiting, pain worsens.,    Contact information:    [address] [place] [id number] [phone]    Additional information:    The medical center is located at   [address] [facility]. (between [address]   [address] in [place], [place] north   of [place]).        Follow up with Clinic, [facility].    Why:  As part of your workup today your CT did show that you have some gallstones.  There is no need for treatment for gallstones at this time because your pain is not due to gallstones however you may need an ultrasound in the future or further care     Contact information:    [address] [place] [address]  [phone]          Follow up with [facility] Emergency Department.    Specialty:  EMERGENCY MEDICINE    Why:  In general if you have pain due to gallstones,  you have vomiting, pain in the right upper abdomen and often the best way to treat gallstone related pain is surgery to remove the gallbladder.    Contact information:    [address] [facility] [address] [phone]    Additional information:    The medical center is located at   [address] [facility]. (between [address]   [address] in [place], four miles north   of [place]).      Discharge References/Attachments     DIVERTICULITIS, DISCHARGE INSTRUCTIONS FOR (ENGLISH)    DIVERTICULITIS (ENGLISH)    HYDROCODONE BITARTRATE, ACETAMINOPHEN ORAL TABLET (ENGLISH)    CIPROFLOXACIN HYDROCHLORIDE ORAL TABLET (ENGLISH)    METRONIDAZOLE ORAL TABLET (ENGLISH)      Your next 10 appointments already scheduled     Aug 15, 2018 11:20 AM CDT   [facility]    [address] [place] [address] [phone]              24 Hour Appointment Hotline       To make an appointment at any [facility], call [phone] ([phone]). If you don't have a family doctor or clinic, we will help you find one. [facility] are conveniently located to serve the needs of you and your family.             Review of your medicines      START taking        Dose / Directions Last dose taken    ciprofloxacin 500 MG tablet   Commonly known as:  CIPRO   Dose:  500 mg   Quantity:  14 tablet        Take 1 tablet (500 mg) by mouth 2 times daily   Refills:  0        HYDROcodone-acetaminophen 5-325 MG per tablet   Commonly known as:  NORCO   Dose:  1 tablet   Quantity:  10 tablet        Take 1 tablet by mouth every 6 hours as needed for severe pain   Refills:  0        metroNIDAZOLE 500 MG tablet   Commonly known as:  FLAGYL   Dose:  500 mg   Quantity:  21 tablet        Take 1 tablet (500 mg) by mouth 3 times daily   Refills:  0          Our records show that you are taking the medicines listed below. If these are incorrect,  please call your family doctor or clinic.        Dose / Directions Last dose taken    cyclobenzaprine 10 MG tablet   Commonly known as:  FLEXERIL   Dose:  10 mg   Quantity:  30 tablet        Take 1 tablet (10 mg) by mouth nightly as needed for muscle spasms   Refills:  1        doxycycline 100 MG capsule   Commonly known as:  VIBRAMYCIN   Dose:  100 mg   Quantity:  20 capsule        Take 1 capsule (100 mg) by mouth 2 times daily   Refills:  0        FLONASE NA        Spray in nostril as needed   Refills:  0        lisinopril 40 MG tablet   Commonly known as:  PRINIVIL/ZESTRIL   Dose:  40 mg   Quantity:  90 tablet        Take 1 tablet (40 mg) by mouth daily   Refills:  3                Information about OPIOIDS     PRESCRIPTION OPIOIDS: WHAT YOU NEED TO KNOW   We gave you an opioid (narcotic) pain medicine. It is important to manage your pain, but opioids are not always the best choice. You should first try all the other options your care team gave you. Take this medicine for as short a time (and as few doses) as possible.    Some activities can increase your pain, such as bandage changes or therapy sessions. It may help to take your pain medicine 30 to 60 minutes before these activities. Reduce your stress by getting enough sleep, working on hobbies you enjoy and practicing relaxation or meditation. Talk to your care team about ways to manage your pain beyond prescription opioids.    These medicines have risks:    DO NOT drive when on new or higher doses of pain medicine. These medicines can affect your alertness and reaction times, and you could be arrested for driving under the influence (DUI). If you need to use opioids long-term, talk to your care team about driving.    DO NOT operate heavy machinery    DO NOT do any other dangerous activities while taking these medicines.    DO NOT drink any alcohol while taking these medicines.     If the opioid prescribed includes acetaminophen, DO NOT take with any other  medicines that contain acetaminophen. Read all labels carefully. Look for the word  acetaminophen  or  Tylenol.  Ask your pharmacist if you have questions or are unsure.    You can get addicted to pain medicines, especially if you have a history of addiction (chemical, alcohol or substance dependence). Talk to your care team about ways to reduce this risk.    All opioids tend to cause constipation. Drink plenty of water and eat foods that have a lot of fiber, such as fruits, vegetables, prune juice, apple juice and high-fiber cereal. Take a laxative (Miralax, milk of magnesia, Colace, Senna) if you don t move your bowels at least every other day. Other side effects include upset stomach, sleepiness, dizziness, throwing up, tolerance (needing more of the medicine to have the same effect), physical dependence and slowed breathing.    Store your pills in a secure place, locked if possible. We will not replace any lost or stolen medicine. If you don t finish your medicine, please throw away (dispose) as directed by your pharmacist. The [place] [employer] has more information about safe disposal: https://www.boosk.state.[place]/living-green/managing-unwanted-medications        Prescriptions were sent or printed at these locations (3 Prescriptions)                   Other Prescriptions                Printed at Department/Unit printer (3 of 3)         ciprofloxacin (CIPRO) 500 MG tablet               HYDROcodone-acetaminophen (NORCO) 5-325 MG per tablet               metroNIDAZOLE (FLAGYL) 500 MG tablet                Procedures and tests performed during your visit     Abd/pelvis CT - no contrast - Stone Protocol    Basic metabolic panel    CBC with platelets differential    UA reflex to Microscopic and Culture      Orders Needing Specimen Collection     None      Pending Results     No orders found from 8/12/2018 to 8/15/2018.            Pending Culture Results     No orders found from 8/12/2018 to  8/15/2018.            Pending Results Instructions     If you had any lab results that were not finalized at the time of your Discharge, you can call the ED Lab Result RN at [phone]. You will be contacted by this team for any positive Lab results or changes in treatment. The nurses are available 7 days a week from 10A to 6:30P.  You can leave a message 24 hours per day and they will return your call.        Test Results From Your Hospital Stay        8/14/2018  9:30 PM      Component Results     Component Value Ref Range & Units Status    Color Urine Yellow  Final    Appearance Urine Clear  Final    Glucose Urine Negative NEG^Negative mg/dL Final    Bilirubin Urine Negative NEG^Negative Final    Ketones Urine 20 (A) NEG^Negative mg/dL Final    Specific Gravity Urine 1.029 1.003 - 1.035 Final    Blood Urine Negative NEG^Negative Final    pH Urine 7.0 5.0 - 7.0 pH Final    Protein Albumin Urine Negative NEG^Negative mg/dL Final    Urobilinogen mg/dL 2.0 0.0 - 2.0 mg/dL Final    Nitrite Urine Negative NEG^Negative Final    Leukocyte Esterase Urine Negative NEG^Negative Final    Source Midstream Urine  Final         8/14/2018  9:27 PM      Component Results     Component Value Ref Range & Units Status    WBC 12.8 (H) 4.0 - 11.0 10e9/L Final    RBC Count 5.06 4.4 - 5.9 10e12/L Final    Hemoglobin 13.7 13.3 - 17.7 g/dL Final    Hematocrit 42.9 40.0 - 53.0 % Final    MCV 85 78 - 100 fl Final    MCH 27.1 26.5 - 33.0 pg Final    MCHC 31.9 31.5 - 36.5 g/dL Final    RDW 13.1 10.0 - 15.0 % Final    Platelet Count 156 150 - 450 10e9/L Final    Diff Method Automated Method  Final    % Neutrophils 80.1 % Final    % Lymphocytes 9.7 % Final    % Monocytes 9.4 % Final    % Eosinophils 0.3 % Final    % Basophils 0.2 % Final    % Immature Granulocytes 0.3 % Final    Nucleated RBCs 0 0 /100 Final    Absolute Neutrophil 10.3 (H) 1.6 - 8.3 10e9/L Final    Absolute Lymphocytes 1.2 0.8 - 5.3 10e9/L Final    Absolute Monocytes 1.2 0.0 -  1.3 10e9/L Final    Absolute Eosinophils 0.0 0.0 - 0.7 10e9/L Final    Absolute Basophils 0.0 0.0 - 0.2 10e9/L Final    Abs Immature Granulocytes 0.0 0 - 0.4 10e9/L Final    Absolute Nucleated RBC 0.0  Final         8/14/2018  9:45 PM      Component Results     Component Value Ref Range & Units Status    Sodium 138 133 - 144 mmol/L Final    Potassium 3.6 3.4 - 5.3 mmol/L Final    Chloride 107 94 - 109 mmol/L Final    Carbon Dioxide 23 20 - 32 mmol/L Final    Anion Gap 8 3 - 14 mmol/L Final    Glucose 101 (H) 70 - 99 mg/dL Final    Urea Nitrogen 17 7 - 30 mg/dL Final    Creatinine 0.94 0.66 - 1.25 mg/dL Final    GFR Estimate 88 >60 mL/min/1.7m2 Final    Non  GFR Calc    GFR Estimate If Black >90 >60 mL/min/1.7m2 Final    African American GFR Calc    Calcium 8.4 (L) 8.5 - 10.1 mg/dL Final         8/14/2018  9:53 PM      Narrative     CT ABDOMEN AND PELVIS WITHOUT CONTRAST  8/14/2018 9:26 PM     INDICATION: Left lower quadrant abdominal pain, dysuria. Evaluate for  obstructing urinary stone, versus constipation versus other acute  intra-abdominal catastrophe.       TECHNIQUE: Thin axial images through the abdomen and pelvis without  contrast. Coronal reformatted images. Radiation dose for this scan was  reduced using automated exposure control, adjustment of the mA and/or  kV according to patient size, or iterative reconstruction technique.    COMPARISON: None.    FINDINGS: Faint rounded densities in the gallbladder which are likely  noncalcified gallstones, sludge balls or possibly polyps measuring up  to 2 cm. Liver, spleen, gallbladder, adrenal glands and kidneys  demonstrate no worrisome findings without contrast. Right renal cyst.  No urinary stones or hydronephrosis.    Extensive fat stranding/inflammation adjacent to the lower descending  colon which demonstrates mild wall thickening. There are several  diverticula in this region and findings are most consistent with  diverticulitis. Small amount  "of free pelvic fluid. No bowel  obstruction or free air.        Impression     IMPRESSION:  1. Moderate diverticulitis of the lower descending colon. No bowel  obstruction or abscess.  2. Small amount of free pelvic fluid. No other acute findings.  3. Two faint rounded densities in the gallbladder, up to 2 cm, likely  noncalcified stones, sludge balls or, less likely, polyps. This would  be better evaluated with follow-up outpatient gallbladder ultrasound.    JAMES CORREA MD                Thank you for choosing [facility]       Thank you for choosing [facility] for your care. Our goal is always to provide you with excellent care. Hearing back from our patients is one way we can continue to improve our services. Please take a few minutes to complete the written survey that you may receive in the mail after you visit with us. Thank you!        [employer] Information     OmPrompt lets you send messages to your doctor, view your test results, renew your prescriptions, schedule appointments and more. To sign up, go to [email or web address] . Click on \"Log in\" on the left side of the screen, which will take you to the Welcome page. Then click on \"Sign up Now\" on the right side of the page.     You will be asked to enter the access code listed below, as well as some personal information. Please follow the directions to create your username and password.     Your access code is: [email or web address]  Expires: 2018 11:17 PM     Your access code will  in 90 days. If you need help or a new code, please call your [facility] clinic or [phone].        Care EveryWhere ID     This is your Care EveryWhere ID. This could be used by other organizations to access your [facility] medical records  [id number]        Equal Access to Services     [facility] BULMARO : Hadlatha Christianson, kaushik wright, keila miranda. So [facility] [phone].    ATENCIÓN: Si allyson español, " tiene a dillon disposición servicios gratuitos de asistencia lingüística. Lllucy al [phone].    We comply with applicable federal civil rights laws and [place] laws. We do not discriminate on the basis of race, color, national origin, age, disability, sex, sexual orientation, or gender identity.            After Visit Summary       This is your record. Keep this with you and show to your community pharmacist(s) and doctor(s) at your next visit.

## 2018-08-14 NOTE — ED AVS SNAPSHOT
[facility] Emergency Department    [address] [facility] [address]    Phone:  [phone]    Fax:  [phone]                                       Rashaun Camara   MRN: [id number]    Department:  [facility] Emergency Department   Date of Visit:  8/14/2018           After Visit Summary Signature Page     I have received my discharge instructions, and my questions have been answered. I have discussed any challenges I see with this plan with the nurse or doctor.    ..........................................................................................................................................  Patient/Patient Representative Signature      ..........................................................................................................................................  Patient Representative Print Name and Relationship to Patient    ..................................................               ................................................  Date                                            Time    ..........................................................................................................................................  Reviewed by Signature/Title    ...................................................              ..............................................  Date                                                            Time

## 2018-08-14 NOTE — LETTER
August 14, 2018      To Whom It May Concern:      Rashaun Camara was seen in our Emergency Department today, 08/14/18.  I expect his condition to improve over the next 5-7 days.  He may return to work/school when improved.  Please excuse him from work if he has to miss work due to pain and discomfort.  He is excused until August 21, 2018    Sincerely,        Carlos Baca MD, FACEP

## 2018-08-14 NOTE — TELEPHONE ENCOUNTER
"C/o moderate to severe abdominal pain since last night (24 hrs) Describes pain as mostly in LLQ but goes to RLQ also. Also radiates to back; constant, 6/10 severity now but has gone up to 8-9. No vomiting. No bloody or black stools. 2 BMs this morning, both normal.  S/p appendectomy. No history of GI problems other than he gets gas and bloating when he eats certain foods. The night before last pt ate a large quantity of pizza which he says he is not used to eating. He thinks that is the reason for his pain. He has taken Gas X, soda in water, Miralax and Metamucil in attempts to relief the pain. No relief w/ any of these.. Advised see provider within 4 hrs per guideline. Advised pt while it may be the result of overeating when pain is this severe and constant for over 2 hrs we have to rule out other possible causes. Advised ED . Pt voiced understanding and agreement. Batool Stewart RN/FNA      Reason for Disposition    [1] MILD-MODERATE pain AND [2] constant AND [3] present > 2 hours    Additional Information    Negative: Shock suspected (e.g., cold/pale/clammy skin, too weak to stand, low BP, rapid pulse)    Negative: Difficult to awaken or acting confused  (e.g., disoriented, slurred speech)    Negative: Passed out (i.e., lost consciousness, collapsed and was not responding)    Negative: Sounds like a life-threatening emergency to the triager    Negative: Chest pain    Negative: Pain is mainly in upper abdomen  (if needed ask: \"is it mainly above the belly button?\")    Negative: Followed an abdomen (stomach) injury    Negative: [1] SEVERE pain (e.g., excruciating) AND [2] present > 1 hour    Negative: [1] SEVERE pain AND [2] age > 60    Negative: [1] Vomiting AND [2] contains red blood or black (\"coffee ground\") material  (Exception: few red streaks in vomit that only happened once)    Negative: Blood in bowel movements  (Exception: Blood on surface of BM with constipation)    Negative: Black or tarry bowel " movements  (Exception: chronic-unchanged  black-grey bowel movements AND is taking iron pills or Pepto-bismol)    Negative: [1] Unable to urinate (or only a few drops) > 4 hours AND     [2] bladder feels very full (e.g., palpable bladder or strong urge to urinate)    Negative: [1] Pain in the scrotum or testicle AND [2] present > 1 hour    Negative: Patient sounds very sick or weak to the triager    Protocols used: ABDOMINAL PAIN - MALE-ADULT-

## 2018-08-15 ENCOUNTER — TELEPHONE (OUTPATIENT)
Dept: FAMILY MEDICINE | Facility: CLINIC | Age: 43
End: 2018-08-15

## 2018-08-15 ENCOUNTER — NURSE TRIAGE (OUTPATIENT)
Dept: NURSING | Facility: CLINIC | Age: 43
End: 2018-08-15

## 2018-08-15 DIAGNOSIS — R51.9 HEADACHE: Primary | ICD-10-CM

## 2018-08-15 RX ORDER — IBUPROFEN 200 MG
600 TABLET ORAL EVERY 6 HOURS PRN
Qty: 100 TABLET | Refills: 1
Start: 2018-08-15 | End: 2019-06-09

## 2018-08-15 NOTE — ED NOTES
Pt states onset Saturday of pain with urination - states pain mainly after stream is completed. Today with increased pain to left flank and left lower abdomen. Patient admits to feeling hot and cold - is playing a game on ipad during initial assessment. Patient is unable to give urine sample at this time - given PO water in an effort to obtain urine. Patient does admit to increased pain with movement.

## 2018-08-15 NOTE — ED PROVIDER NOTES
History     Chief Complaint   Patient presents with     Abdominal Pain     constipation, bloating     HPI  Rashaun Camara is a 43 year old male history of hypertension who presents for evaluation for constipation bloating and pain at the end of urinary stream.  Symptoms over the last 3 days.  He felt his pain may be due to recently eating pizza while on a ketogenic diet. No personal history of kidney stones.  No history of urinary tract infection and no prior history of intra-abdominal surgeries.. No family history of kidney stones.  He is on lisinopril for hypertension.  He reports trying over-the-counter remedies including Gas-X, Metamucil and MiraLAX without significant relief of his symptoms.  Because of progressive pain now more pronounced in the left lower quadrant that does not radiate, and sensation of feeling constipated and bloated he is here in the emergency department by private car with his partner and child for further care and evaluation.  He reports no diarrhea.  He also reports he has felt cold off and on.  No back pain, no flank pain, and no recent fall or trauma.    Problem List:    Patient Active Problem List    Diagnosis Date Noted     Benign essential hypertension 09/27/2017     Priority: Medium        Past Medical History:    No past medical history on file.    Past Surgical History:    No past surgical history on file.    Family History:    No family history on file.    Social History:  Marital Status:  Single [1]  Social History   Substance Use Topics     Smoking status: Never Smoker     Smokeless tobacco: Never Used     Alcohol use No        Medications:      ciprofloxacin (CIPRO) 500 MG tablet   HYDROcodone-acetaminophen (NORCO) 5-325 MG per tablet   metroNIDAZOLE (FLAGYL) 500 MG tablet   cyclobenzaprine (FLEXERIL) 10 MG tablet   doxycycline (VIBRAMYCIN) 100 MG capsule   Fluticasone Propionate (FLONASE NA)   lisinopril (PRINIVIL/ZESTRIL) 40 MG tablet         Review of Systems    Constitutional: Negative.    HENT: Negative.    Eyes: Negative.    Respiratory: Negative.    Cardiovascular: Negative.    Gastrointestinal: Positive for abdominal pain.   Endocrine: Negative.    Genitourinary: Positive for dysuria (pain at the end of urinary stream). Negative for discharge, penile swelling, scrotal swelling, testicular pain and urgency.   Musculoskeletal: Negative.    Skin: Negative.    Allergic/Immunologic: Negative.    Neurological: Negative.    Hematological: Negative.    Psychiatric/Behavioral: Negative.    All other systems reviewed and are negative.      Physical Exam   BP: 125/83  Pulse: 71  Temp: 99.2  F (37.3  C)  Resp: 18  Weight: 144.2 kg (318 lb)  SpO2: 96 %      Physical Exam   Constitutional: He is oriented to person, place, and time. He appears well-developed and well-nourished. No distress.   HENT:   Head: Normocephalic and atraumatic.   Eyes: Conjunctivae and EOM are normal. Pupils are equal, round, and reactive to light. Right eye exhibits no discharge. Left eye exhibits no discharge. No scleral icterus.   Neck: Normal range of motion. Neck supple. No JVD present. No tracheal deviation present. No thyromegaly present.   Cardiovascular: Normal rate and regular rhythm.  Exam reveals no gallop and no friction rub.    No murmur heard.  Pulmonary/Chest: Effort normal and breath sounds normal. No stridor. No respiratory distress. He has no wheezes. He has no rales. He exhibits no tenderness.   Abdominal: Soft. Bowel sounds are normal. There is tenderness in the left lower quadrant. There is guarding.       Lymphadenopathy:     He has no cervical adenopathy.   Neurological: He is alert and oriented to person, place, and time. He displays normal reflexes. No cranial nerve deficit. He exhibits normal muscle tone. Coordination normal.   Skin: No rash noted. He is not diaphoretic. No erythema. No pallor.   Psychiatric: He has a normal mood and affect. His behavior is normal. Judgment and  thought content normal.       ED Course     ED Course     Procedures               Critical Care time:  none                     ED medications:  Medications   HYDROmorphone (PF) (DILAUDID) injection 0.5 mg (0.5 mg Intravenous Given 8/14/18 2210)   0.9% sodium chloride BOLUS (0 mLs Intravenous Stopped 8/14/18 2205)   ondansetron (ZOFRAN) injection 4 mg (4 mg Intravenous Given 8/14/18 2055)       ED Labs and imaging:  Results for orders placed or performed during the hospital encounter of 08/14/18   Abd/pelvis CT - no contrast - Stone Protocol    Narrative    CT ABDOMEN AND PELVIS WITHOUT CONTRAST  8/14/2018 9:26 PM     INDICATION: Left lower quadrant abdominal pain, dysuria. Evaluate for  obstructing urinary stone, versus constipation versus other acute  intra-abdominal catastrophe.       TECHNIQUE: Thin axial images through the abdomen and pelvis without  contrast. Coronal reformatted images. Radiation dose for this scan was  reduced using automated exposure control, adjustment of the mA and/or  kV according to patient size, or iterative reconstruction technique.    COMPARISON: None.    FINDINGS: Faint rounded densities in the gallbladder which are likely  noncalcified gallstones, sludge balls or possibly polyps measuring up  to 2 cm. Liver, spleen, gallbladder, adrenal glands and kidneys  demonstrate no worrisome findings without contrast. Right renal cyst.  No urinary stones or hydronephrosis.    Extensive fat stranding/inflammation adjacent to the lower descending  colon which demonstrates mild wall thickening. There are several  diverticula in this region and findings are most consistent with  diverticulitis. Small amount of free pelvic fluid. No bowel  obstruction or free air.      Impression    IMPRESSION:  1. Moderate diverticulitis of the lower descending colon. No bowel  obstruction or abscess.  2. Small amount of free pelvic fluid. No other acute findings.  3. Two faint rounded densities in the  gallbladder, up to 2 cm, likely  noncalcified stones, sludge balls or, less likely, polyps. This would  be better evaluated with follow-up outpatient gallbladder ultrasound.    JAMES CORREA MD   UA reflex to Microscopic and Culture   Result Value Ref Range    Color Urine Yellow     Appearance Urine Clear     Glucose Urine Negative NEG^Negative mg/dL    Bilirubin Urine Negative NEG^Negative    Ketones Urine 20 (A) NEG^Negative mg/dL    Specific Gravity Urine 1.029 1.003 - 1.035    Blood Urine Negative NEG^Negative    pH Urine 7.0 5.0 - 7.0 pH    Protein Albumin Urine Negative NEG^Negative mg/dL    Urobilinogen mg/dL 2.0 0.0 - 2.0 mg/dL    Nitrite Urine Negative NEG^Negative    Leukocyte Esterase Urine Negative NEG^Negative    Source Midstream Urine    CBC with platelets differential   Result Value Ref Range    WBC 12.8 (H) 4.0 - 11.0 10e9/L    RBC Count 5.06 4.4 - 5.9 10e12/L    Hemoglobin 13.7 13.3 - 17.7 g/dL    Hematocrit 42.9 40.0 - 53.0 %    MCV 85 78 - 100 fl    MCH 27.1 26.5 - 33.0 pg    MCHC 31.9 31.5 - 36.5 g/dL    RDW 13.1 10.0 - 15.0 %    Platelet Count 156 150 - 450 10e9/L    Diff Method Automated Method     % Neutrophils 80.1 %    % Lymphocytes 9.7 %    % Monocytes 9.4 %    % Eosinophils 0.3 %    % Basophils 0.2 %    % Immature Granulocytes 0.3 %    Nucleated RBCs 0 0 /100    Absolute Neutrophil 10.3 (H) 1.6 - 8.3 10e9/L    Absolute Lymphocytes 1.2 0.8 - 5.3 10e9/L    Absolute Monocytes 1.2 0.0 - 1.3 10e9/L    Absolute Eosinophils 0.0 0.0 - 0.7 10e9/L    Absolute Basophils 0.0 0.0 - 0.2 10e9/L    Abs Immature Granulocytes 0.0 0 - 0.4 10e9/L    Absolute Nucleated RBC 0.0    Basic metabolic panel   Result Value Ref Range    Sodium 138 133 - 144 mmol/L    Potassium 3.6 3.4 - 5.3 mmol/L    Chloride 107 94 - 109 mmol/L    Carbon Dioxide 23 20 - 32 mmol/L    Anion Gap 8 3 - 14 mmol/L    Glucose 101 (H) 70 - 99 mg/dL    Urea Nitrogen 17 7 - 30 mg/dL    Creatinine 0.94 0.66 - 1.25 mg/dL    GFR Estimate 88  >60 mL/min/1.7m2    GFR Estimate If Black >90 >60 mL/min/1.7m2    Calcium 8.4 (L) 8.5 - 10.1 mg/dL         ED Vitals:  Vitals:    08/14/18 2215 08/14/18 2230 08/14/18 2245 08/14/18 2300   BP: 141/88 144/89 137/86 148/87   Pulse:       Resp:       Temp:       TempSrc:       SpO2: 95%  94% 98%   Weight:         Assessments & Plan (with Medical Decision Making)   Clinical impression: 43-year-old male who presented for evaluation for 3 day history of left lower quadrant abdominal pain and pain at the end of his urinary stream. Symptoms are likely due to acute diverticulitis of the lower descending colon.   he reported no personal history of intra-abdominal surgeries.  No penile discharge, no hematuria, no personal or family history of kidney stones. On  lisinopril for history of hypertension.  He felt like he may be constipated and  bloated.  He tried a variety of over-the-counter remedies including gas-X, MiraLAX and Metamucil without significant relief.  He also reported chills.  Because of progressive pain and discomfort despite trial of over-the-counter remedies he is here in the emergency department by private car with his partner and child.  On my exam He is an obese male who  appeared uncomfortable.  Guarding over the left lower quadrant   Afebrile and hemodynamically normal.      ED course and plan:  We discussed differential diagnosis for his symptoms  Including but not limited to diverticulitis, obstructing urinary stone, pyelonephritis, constipation, versus other acute intra-abdominal catastrophe.  He was given Dilaudid for pain and discomfort.  Urine was obtained as well as blood work.  CT imaging was obtained given his degree of obesity localized pain with report of dysuria, to exclude an obstructing urinary stone, diverticulitis versus other acute intra-abdominal catastrophe.    White count today is 12.8.  Midstream urinalysis showed no  Pyuria, no microscopic hematuria.   CT imaging without contrast  showed moderate diverticulitis of the lower descending colon without abscess or obstruction. Gallstones noted incidentally on CT imaging today.  Patient is not reporting any upper abdominal pain or discomfort.  Please see the interpreting radiologist report above.    Patient was reexamined after CT imaging blood work and pain medication.  He  reported improvement in his pain and discomfort.  CT findings were reviewed with the patient .  I also reviewed the findings of gallstones.  His exam is not concerning for acute cholecystitis or symptomatic cholelithiasis.  He is encouraged that if he develops pain in the upper abdomen he may need to have further evaluation of the his gall bladder. He was discharged home with ciprofloxacin and flagyl for 7 days. He was given norco for home as needed. He was given a work note. We reviewed reasons to return to the ED for care. Follow-up in clinic within 1 week as needed.      Disclaimer: This note consists of symbols derived from keyboarding, dictation and/or voice recognition software. As a result, there may be errors in the script that have gone undetected. Please consider this when interpreting information found in this chart.  I have reviewed the nursing notes.    I have reviewed the findings, diagnosis, plan and need for follow up with the patient.       New Prescriptions    CIPROFLOXACIN (CIPRO) 500 MG TABLET    Take 1 tablet (500 mg) by mouth 2 times daily    HYDROCODONE-ACETAMINOPHEN (NORCO) 5-325 MG PER TABLET    Take 1 tablet by mouth every 6 hours as needed for severe pain    METRONIDAZOLE (FLAGYL) 500 MG TABLET    Take 1 tablet (500 mg) by mouth 3 times daily       Final diagnoses:   Abdominal pain, left lower quadrant - Likely due to low descending colonic diverticulitis   Diverticulitis of colon - CT imaging shows lower descending colonic diverticulitis without abscess or perforation   Gallstones - CT imaging today also did show they have some stones in her  gallbladder.  The location of your pain is not due to gallstones but more likely due to diverticulitis       8/14/2018   [facility] EMERGENCY DEPARTMENT     Fredy Baca MD  08/15/18 0114

## 2018-08-15 NOTE — TELEPHONE ENCOUNTER
Clinic Action Needed:  Yes, callback  [facility] Triage Call  Presenting Problem:  Rashaun is calling and requesting to speak with MD at [facility].  He was into ED and diagnosed with Diverticulitis and is on Cipro and Flagyl and Hydrocodone and today is wanting  To know if it is alright to take Ibuprofen as he has a terrible headach and Hydrocodone is not working.  Rashaun is requesting a call back please phone.    Routed to:  RN Pool  Please be sure to close this encounter once this patient's issue/question has been addressed.    April Oleary RN/[facility]

## 2018-08-15 NOTE — TELEPHONE ENCOUNTER
Per Alexus Chavez ok to take as long as he is not bleeding. Kidney Function test normal. Pt notified ok to take. Jaclyn Camargo RN

## 2018-08-15 NOTE — TELEPHONE ENCOUNTER
Rashaun is calling and requesting to speak with MD at [facility].  He was into ED and diagnosed with Diverticulitis and is on Cipro and Flagyl and Hydrocodone and today is wanting  To know if it is alright to take Ibuprofen as he has a terrible headach and Hydrocodone is not working.  Rashaun is requesting a call back please phone.

## 2018-08-16 ENCOUNTER — TELEPHONE (OUTPATIENT)
Dept: FAMILY MEDICINE | Facility: CLINIC | Age: 43
End: 2018-08-16

## 2018-08-16 DIAGNOSIS — R11.0 NAUSEA: Primary | ICD-10-CM

## 2018-08-16 RX ORDER — ONDANSETRON 8 MG/1
8 TABLET, FILM COATED ORAL EVERY 8 HOURS PRN
Qty: 20 TABLET | Refills: 1 | Status: SHIPPED | OUTPATIENT
Start: 2018-08-16 | End: 2018-12-03

## 2018-08-16 NOTE — TELEPHONE ENCOUNTER
Pt called. States he is taking Cipro but as of this am stopped taking Flagyl. States it is making him nauseated, food isn't tasting well.States he is hoping he can be prescribed something else. Please advise. Jaclyn Camargo RN

## 2018-08-16 NOTE — TELEPHONE ENCOUNTER
He is allergic to the alternative.   There is no other treatment plan, I did talk with pharmacy. He needs to take the metronidazole and you can give him something for the nausea. No alcohol use.

## 2018-08-16 NOTE — TELEPHONE ENCOUNTER
Pt called will do medication for nausea. Per Dr Keith 8 mg zofran q 8 hours prn. Med sent to pharmacy. Jaclyn Camargo RN

## 2018-08-16 NOTE — TELEPHONE ENCOUNTER
Sweating, nauseated, mild headache after taking Flagyl. Call provider within 24 hours.  Charmaine Mccabe RN  [facility]      Reason for Disposition    Taking prescription medication that could cause nausea (e.g., narcotics/opiates, antibiotics, OCPs, many others)    Additional Information    Negative: Unable to walk, or can only walk with assistance (e.g., requires support)    Negative: Difficulty breathing    Negative: [1] Insulin-dependent diabetes (Type I) AND [2] glucose > 400 mg/dl (22 mmol/l)    Negative: [1] Drinking very little AND [2] dehydration suspected (e.g., no urine > 12 hours, very dry mouth, very lightheaded)    Negative: Patient sounds very sick or weak to the triager    Negative: Fever > 104 F (40 C)    Negative: [1] Fever > 101 F (38.3 C) AND [2] age > 60    Negative: [1] Fever > 101 F (38.3 C) AND [2] bedridden (e.g., nursing home patient, CVA, chronic illness, recovering from surgery)    Negative: [1] Fever > 100.5 F (38.1 C) AND [2] diabetes mellitus or weak immune system (e.g., HIV positive, cancer chemo, splenectomy, organ transplant, chronic steroids)    Negative: Taking any of the following medications: digoxin (Lanoxin), lithium, theophylline, phenytoin (Dilantin)    Negative: Yellowish color of the skin or white of the eye (i.e., jaundice)    Negative: Fever present > 3 days (72 hours)    Negative: Receiving cancer chemotherapy medication    Protocols used: NAUSEA-ADULT-

## 2018-08-16 NOTE — TELEPHONE ENCOUNTER
Reason for call:  Patient reporting a symptom    Symptom or request: Diverticulitis- Nausea, Temp. Pt is having problems with Metronidazole- Pt has Nausea, foods don't taste good. Is there another med he can take.    Duration (how long have symptoms been present): See [facility] ER Notes     Have you been treated for this before? Yes      Phone Number patient can be reached at:  Home number on file [phone] (home)    Best Time:  Any Time      Can we leave a detailed message on this number:  YES    Call taken on 8/16/2018 at 8:15 AM by Yoly Maradiaga

## 2018-12-03 ENCOUNTER — OFFICE VISIT (OUTPATIENT)
Dept: FAMILY MEDICINE | Facility: CLINIC | Age: 43
End: 2018-12-03
Payer: COMMERCIAL

## 2018-12-03 VITALS
HEIGHT: 77 IN | DIASTOLIC BLOOD PRESSURE: 99 MMHG | RESPIRATION RATE: 18 BRPM | SYSTOLIC BLOOD PRESSURE: 149 MMHG | WEIGHT: 315 LBS | HEART RATE: 67 BPM | OXYGEN SATURATION: 98 % | BODY MASS INDEX: 37.19 KG/M2 | TEMPERATURE: 98.2 F

## 2018-12-03 DIAGNOSIS — E66.01 MORBID OBESITY (H): ICD-10-CM

## 2018-12-03 DIAGNOSIS — J06.9 VIRAL URI WITH COUGH: Primary | ICD-10-CM

## 2018-12-03 DIAGNOSIS — I10 BENIGN ESSENTIAL HYPERTENSION: ICD-10-CM

## 2018-12-03 PROCEDURE — 99214 OFFICE O/P EST MOD 30 MIN: CPT | Performed by: PHYSICIAN ASSISTANT

## 2018-12-03 RX ORDER — BENZONATATE 200 MG/1
200 CAPSULE ORAL 3 TIMES DAILY PRN
Qty: 21 CAPSULE | Refills: 0 | Status: SHIPPED | OUTPATIENT
Start: 2018-12-03 | End: 2019-03-06

## 2018-12-03 NOTE — PATIENT INSTRUCTIONS
Can try tessalon for cough  If things not improving or are really worsening, call to update me     BP -  Start monitoring    Weight -   Keep up the awesome weight loss    Recommend flu shot

## 2018-12-03 NOTE — MR AVS SNAPSHOT
"              After Visit Summary   12/3/2018    Rashaun Camara    MRN: [id number]           Patient Information     Date Of Birth          1975        Visit Information        Provider Department      12/3/2018 4:20 PM Maryana Mc PA-C [facility]        Today's Diagnoses     Viral URI with cough    -  1    Morbid obesity (H)          Care Instructions    Can try tessalon for cough  If things not improving or are really worsening, call to update me     BP -  Start monitoring    Weight -   Keep up the awesome weight loss          Follow-ups after your visit        Who to contact     If you have questions or need follow up information about today's clinic visit or your schedule please contact [facility] directly at [phone].  Normal or non-critical lab and imaging results will be communicated to you by MyChart, letter or phone within 4 business days after the clinic has received the results. If you do not hear from us within 7 days, please contact the clinic through MyChart or phone. If you have a critical or abnormal lab result, we will notify you by phone as soon as possible.  Submit refill requests through [employer] or call your pharmacy and they will forward the refill request to us. Please allow 3 business days for your refill to be completed.          Additional Information About Your Visit        Care EveryWhere ID     This is your Care EveryWhere ID. This could be used by other organizations to access your [place] medical records  [id number]        Your Vitals Were     Pulse Temperature Respirations Height Pulse Oximetry BMI (Body Mass Index)    67 98.2  F (36.8  C) (Tympanic) 18 6' 5\" (1.956 m) 98% 38.16 kg/m2       Blood Pressure from Last 3 Encounters:   12/03/18 (!) 149/99   08/14/18 148/87   02/27/18 138/78    Weight from Last 3 Encounters:   12/03/18 321 lb 12.8 oz (146 kg)   08/14/18 318 lb (144.2 kg)   05/07/18 (!) 349 lb 3.2 oz (158.4 kg)    "           Today, you had the following     No orders found for display         Today's Medication Changes          These changes are accurate as of 12/3/18  5:04 PM.  If you have any questions, ask your nurse or doctor.               Start taking these medicines.        Dose/Directions    benzonatate 200 MG capsule   Commonly known as:  TESSALON   Used for:  Viral URI with cough   Started by:  Maryana Mc PA-C        Dose:  200 mg   Take 1 capsule (200 mg) by mouth 3 times daily as needed for cough   Quantity:  21 capsule   Refills:  0         Stop taking these medicines if you haven't already. Please contact your care team if you have questions.     ciprofloxacin 500 MG tablet   Commonly known as:  CIPRO   Stopped by:  Maryana Mc PA-C           doxycycline hyclate 100 MG capsule   Commonly known as:  VIBRAMYCIN   Stopped by:  Maryana Mc PA-C           HYDROcodone-acetaminophen 5-325 MG tablet   Commonly known as:  NORCO   Stopped by:  Maryana Mc PA-C           metroNIDAZOLE 500 MG tablet   Commonly known as:  FLAGYL   Stopped by:  Maryana Mc PA-C           ondansetron 8 MG tablet   Commonly known as:  ZOFRAN   Stopped by:  Maryana Mc PA-C                Where to get your medicines      These medications were sent to [facility] [place] [address]     Phone:  [phone]     benzonatate 200 MG capsule                Primary Care Provider Office Phone # Fax #    [facility] [phone]       [address] [place], [place] [address]        Equal Access to Services     [facility]: Hadii radhames walkero Soyannick, waaxda luqadaha, qaybta kaalmada adekeila wheeler. Formerly [facility] [phone].    ATENCIÓN: Si habla español, tiene a dillon disposición servicios gratuitos de asistencia lingüística. Llame al  [phone].    We comply with applicable federal civil rights laws and [place] laws. We do not discriminate on the basis of race, color, national origin, age, disability, sex, sexual orientation, or gender identity.            Thank you!     Thank you for choosing [facility]  for your care. Our goal is always to provide you with excellent care. Hearing back from our patients is one way we can continue to improve our services. Please take a few minutes to complete the written survey that you may receive in the mail after your visit with us. Thank you!             Your Updated Medication List - Protect others around you: Learn how to safely use, store and throw away your medicines at www.disposemymeds.org.          This list is accurate as of 12/3/18  5:04 PM.  Always use your most recent med list.                   Brand Name Dispense Instructions for use Diagnosis    benzonatate 200 MG capsule    TESSALON    21 capsule    Take 1 capsule (200 mg) by mouth 3 times daily as needed for cough    Viral URI with cough       cyclobenzaprine 10 MG tablet    FLEXERIL    30 tablet    Take 1 tablet (10 mg) by mouth nightly as needed for muscle spasms    Injury of left lower extremity, subsequent encounter       FLONASE NA      Spray in nostril as needed        ibuprofen 200 MG tablet    [facility] IBUPROFEN    100 tablet    Take 3 tablets (600 mg) by mouth every 6 hours as needed for mild pain    Headache       lisinopril 40 MG tablet    PRINIVIL/ZESTRIL    90 tablet    Take 1 tablet (40 mg) by mouth daily    Benign essential hypertension

## 2018-12-03 NOTE — NURSING NOTE
"Chief Complaint   Patient presents with     Cough       Initial BP (!) 149/99 (BP Location: Right arm, Patient Position: Chair, Cuff Size: Adult Large)  Pulse 67  Temp 98.2  F (36.8  C) (Tympanic)  Resp 18  Ht 6' 5\" (1.956 m)  Wt 321 lb 12.8 oz (146 kg)  SpO2 98%  BMI 38.16 kg/m2 Estimated body mass index is 38.16 kg/(m^2) as calculated from the following:    Height as of this encounter: 6' 5\" (1.956 m).    Weight as of this encounter: 321 lb 12.8 oz (146 kg).    Patient presents to the clinic using No DME    Health Maintenance that is potentially due pending provider review:  NONE        Is there anyone who you would like to be able to receive your results? No  If yes have patient fill out JASON      "

## 2018-12-03 NOTE — PROGRESS NOTES
SUBJECTIVE:   Rashaun Camara is a 43 year old male who presents to clinic today for the following health issues:    ENT Symptoms             Symptoms: cc Present Absent Comment   Fever/Chills  x  chills   Fatigue  x     Muscle Aches   x    Eye Irritation   x    Sneezing   x    Nasal Rito/Drg  x     Sinus Pressure/Pain  x     Loss of smell  x     Dental pain   x    Sore Throat   x    Swollen Glands   x    Ear Pain/Fullness   x    Cough  x  Unproductive.  Feels he is clearing his throat a lot   Wheeze  x     Chest Pain  x  Tightness, felt heavy.     Shortness of breath  x  Just harder to breathe   Rash   x    Other   x      Symptom duration:  1 week for cold symptoms, did feel that it was getting better, and then cough/chest started 2 days ago   Symptom severity:  moderate   Treatments tried:  Dayquil, Nyquil, Severe cold/cough, afrin   Contacts:  no     Typical head cold.  After that, then got the cough and wheeze.  Started seemingly with going back to CPAP - very productive for 1 night, then more of a dry cough since.  A bit harder to take a breathe.  A few times chills but works outside.  No known fever but doesn't check.    Used to get yearly bronchitis but has been awhile now.  Wt loss - lost 80 pounds in 8 months, lifestyle changes, feeling better.    HTN - BP high today but he thinks has been improving.  Wt loss as above.    Problem list and histories reviewed & adjusted, as indicated.  Additional history: as documented    BP Readings from Last 3 Encounters:   12/03/18 (!) 149/99   08/14/18 148/87   02/27/18 138/78    Wt Readings from Last 3 Encounters:   12/03/18 321 lb 12.8 oz (146 kg)   08/14/18 318 lb (144.2 kg)   05/07/18 (!) 349 lb 3.2 oz (158.4 kg)         Reviewed and updated as needed this visit by clinical staff  Tobacco  Allergies  Meds  Problems  Med Hx  Surg Hx  Fam Hx  Soc Hx        Reviewed and updated as needed this visit by Provider  Tobacco  Allergies  Meds  Problems  Med Hx  Surg  "Hx  Fam Hx  Soc Hx          ROS:  Constitutional, HEENT, cardiovascular, pulmonary, GI, , musculoskeletal, neuro, skin, endocrine systems are negative, except as otherwise noted.    OBJECTIVE:     BP (!) 149/99 (BP Location: Right arm, Patient Position: Chair, Cuff Size: Adult Large)  Pulse 67  Temp 98.2  F (36.8  C) (Tympanic)  Resp 18  Ht 6' 5\" (1.956 m)  Wt 321 lb 12.8 oz (146 kg)  SpO2 98%  BMI 38.16 kg/m2  Body mass index is 38.16 kg/(m^2).  GENERAL: healthy, alert and no distress  EYES: Eyes grossly normal to inspection, PERRL and conjunctivae and sclerae normal  HENT: ear canals and TM's normal, nose and mouth without ulcers or lesions  NECK: no adenopathy, no asymmetry, masses, or scars   RESP: lungs clear to auscultation - no rales, rhonchi or wheezes  CV: regular rate and rhythm, normal S1 S2, no S3 or S4, no murmur, click or rub, no peripheral edema   MS: no gross musculoskeletal defects noted, no edema    ASSESSMENT/PLAN:       ICD-10-CM    1. Viral URI with cough J06.9 benzonatate (TESSALON) 200 MG capsule    B97.89    2. Benign essential hypertension I10    3. Morbid obesity (H) E66.01        Patient Instructions   Can try tessalon for cough  If things not improving or are really worsening, call to update me     BP -  Start monitoring    Weight -   Keep up the awesome weight loss    Recommend flu shot      Maryana Mc PA-C  [facility]    "

## 2018-12-06 ENCOUNTER — TELEPHONE (OUTPATIENT)
Dept: FAMILY MEDICINE | Facility: CLINIC | Age: 43
End: 2018-12-06

## 2018-12-06 DIAGNOSIS — J01.90 ACUTE SINUSITIS WITH SYMPTOMS > 10 DAYS: Primary | ICD-10-CM

## 2018-12-06 RX ORDER — DOXYCYCLINE 100 MG/1
100 CAPSULE ORAL 2 TIMES DAILY
Qty: 20 CAPSULE | Refills: 0 | Status: SHIPPED | OUTPATIENT
Start: 2018-12-06 | End: 2019-03-06

## 2018-12-06 NOTE — TELEPHONE ENCOUNTER
Reason for call:  Patient reporting a symptom    Symptom or request: Pain in sinus & Rt eye area with a lot of drainage. Pt was in 12/3/18 no medication was given. Pt is wondering what is the next step he is not getting better.     Duration (how long have symptoms been present): x 1 week    Phone Number patient can be reached at:  Home number on file [phone] (home)    Best Time:  Any Time      Can we leave a detailed message on this number:  YES    Call taken on 12/6/2018 at 8:06 AM by Yoly Maradiaga

## 2019-02-27 ENCOUNTER — TELEPHONE (OUTPATIENT)
Dept: FAMILY MEDICINE | Facility: CLINIC | Age: 44
End: 2019-02-27

## 2019-03-06 ENCOUNTER — OFFICE VISIT (OUTPATIENT)
Dept: FAMILY MEDICINE | Facility: CLINIC | Age: 44
End: 2019-03-06
Payer: COMMERCIAL

## 2019-03-06 VITALS
TEMPERATURE: 99.2 F | RESPIRATION RATE: 18 BRPM | SYSTOLIC BLOOD PRESSURE: 128 MMHG | WEIGHT: 315 LBS | BODY MASS INDEX: 37.19 KG/M2 | HEIGHT: 77 IN | DIASTOLIC BLOOD PRESSURE: 78 MMHG | HEART RATE: 60 BPM

## 2019-03-06 DIAGNOSIS — R10.32 LLQ ABDOMINAL PAIN: ICD-10-CM

## 2019-03-06 DIAGNOSIS — K58.0 IRRITABLE BOWEL SYNDROME WITH DIARRHEA: ICD-10-CM

## 2019-03-06 DIAGNOSIS — K57.32 DIVERTICULITIS OF COLON: Primary | ICD-10-CM

## 2019-03-06 DIAGNOSIS — I10 BENIGN ESSENTIAL HYPERTENSION: ICD-10-CM

## 2019-03-06 LAB
ALBUMIN SERPL-MCNC: 4.1 G/DL (ref 3.4–5)
ALP SERPL-CCNC: 58 U/L (ref 40–150)
ALT SERPL W P-5'-P-CCNC: 31 U/L (ref 0–70)
ANION GAP SERPL CALCULATED.3IONS-SCNC: 4 MMOL/L (ref 3–14)
AST SERPL W P-5'-P-CCNC: 15 U/L (ref 0–45)
BASOPHILS # BLD AUTO: 0 10E9/L (ref 0–0.2)
BASOPHILS NFR BLD AUTO: 0.2 %
BILIRUB SERPL-MCNC: 0.9 MG/DL (ref 0.2–1.3)
BUN SERPL-MCNC: 18 MG/DL (ref 7–30)
CALCIUM SERPL-MCNC: 8.8 MG/DL (ref 8.5–10.1)
CHLORIDE SERPL-SCNC: 106 MMOL/L (ref 94–109)
CO2 SERPL-SCNC: 28 MMOL/L (ref 20–32)
CREAT SERPL-MCNC: 1.08 MG/DL (ref 0.66–1.25)
DIFFERENTIAL METHOD BLD: NORMAL
EOSINOPHIL # BLD AUTO: 0.1 10E9/L (ref 0–0.7)
EOSINOPHIL NFR BLD AUTO: 1.4 %
ERYTHROCYTE [DISTWIDTH] IN BLOOD BY AUTOMATED COUNT: 13.4 % (ref 10–15)
GFR SERPL CREATININE-BSD FRML MDRD: 83 ML/MIN/{1.73_M2}
GLUCOSE SERPL-MCNC: 102 MG/DL (ref 70–99)
HCT VFR BLD AUTO: 46 % (ref 40–53)
HGB BLD-MCNC: 15.3 G/DL (ref 13.3–17.7)
LIPASE SERPL-CCNC: 141 U/L (ref 73–393)
LYMPHOCYTES # BLD AUTO: 1.4 10E9/L (ref 0.8–5.3)
LYMPHOCYTES NFR BLD AUTO: 15.9 %
MCH RBC QN AUTO: 28.4 PG (ref 26.5–33)
MCHC RBC AUTO-ENTMCNC: 33.3 G/DL (ref 31.5–36.5)
MCV RBC AUTO: 86 FL (ref 78–100)
MONOCYTES # BLD AUTO: 1 10E9/L (ref 0–1.3)
MONOCYTES NFR BLD AUTO: 11.4 %
NEUTROPHILS # BLD AUTO: 6.1 10E9/L (ref 1.6–8.3)
NEUTROPHILS NFR BLD AUTO: 71.1 %
PLATELET # BLD AUTO: 173 10E9/L (ref 150–450)
POTASSIUM SERPL-SCNC: 5 MMOL/L (ref 3.4–5.3)
PROT SERPL-MCNC: 7.6 G/DL (ref 6.8–8.8)
RBC # BLD AUTO: 5.38 10E12/L (ref 4.4–5.9)
SODIUM SERPL-SCNC: 138 MMOL/L (ref 133–144)
WBC # BLD AUTO: 8.5 10E9/L (ref 4–11)

## 2019-03-06 PROCEDURE — 99214 OFFICE O/P EST MOD 30 MIN: CPT | Performed by: NURSE PRACTITIONER

## 2019-03-06 PROCEDURE — 83516 IMMUNOASSAY NONANTIBODY: CPT | Performed by: NURSE PRACTITIONER

## 2019-03-06 PROCEDURE — 80053 COMPREHEN METABOLIC PANEL: CPT | Performed by: NURSE PRACTITIONER

## 2019-03-06 PROCEDURE — 83516 IMMUNOASSAY NONANTIBODY: CPT | Mod: 59 | Performed by: NURSE PRACTITIONER

## 2019-03-06 PROCEDURE — 83690 ASSAY OF LIPASE: CPT | Performed by: NURSE PRACTITIONER

## 2019-03-06 PROCEDURE — 36415 COLL VENOUS BLD VENIPUNCTURE: CPT | Performed by: NURSE PRACTITIONER

## 2019-03-06 PROCEDURE — 85025 COMPLETE CBC W/AUTO DIFF WBC: CPT | Performed by: NURSE PRACTITIONER

## 2019-03-06 RX ORDER — CIPROFLOXACIN 500 MG/1
500 TABLET, FILM COATED ORAL 2 TIMES DAILY
Qty: 14 TABLET | Refills: 0 | Status: SHIPPED | OUTPATIENT
Start: 2019-03-06 | End: 2019-03-28

## 2019-03-06 RX ORDER — ONDANSETRON 4 MG/1
4 TABLET, ORALLY DISINTEGRATING ORAL EVERY 8 HOURS PRN
Qty: 20 TABLET | Refills: 1 | Status: SHIPPED | OUTPATIENT
Start: 2019-03-06 | End: 2019-07-05

## 2019-03-06 RX ORDER — METRONIDAZOLE 500 MG/1
500 TABLET ORAL 3 TIMES DAILY
Qty: 21 TABLET | Refills: 0 | Status: SHIPPED | OUTPATIENT
Start: 2019-03-06 | End: 2019-07-03

## 2019-03-06 RX ORDER — LISINOPRIL 40 MG/1
40 TABLET ORAL DAILY
Qty: 90 TABLET | Refills: 0 | Status: SHIPPED | OUTPATIENT
Start: 2019-03-06 | End: 2019-06-10

## 2019-03-06 ASSESSMENT — MIFFLIN-ST. JEOR: SCORE: 2522.86

## 2019-03-06 NOTE — PROGRESS NOTES
SUBJECTIVE:   Rashaun Camara is a 43 year old male who presents to clinic today for the following health issues:    ABDOMINAL   PAIN     Onset: yesterday    Description:   Character: Sharp  Location: left lower quadrant  Radiation: None    Intensity: moderate    Progression of Symptoms:  worsening    Accompanying Signs & Symptoms:  Fever/Chills?: YES  Gas/Bloating: YES  Nausea: no   Vomitting: no   Diarrhea?: no   Constipation:YES  Dysuria or Hematuria: no    History:   Trauma: no   Previous similar pain: YES- diverticulitis   Previous tests done: CT    Precipitating factors:   Does the pain change with:     Food: no      BM: YES- hurt more after bowel movement    Urination: no     Alleviating factors:  none    Therapies Tried and outcome: ibuprofen     LMP:  not applicable         Problem list and histories reviewed & adjusted, as indicated.  Additional history: as documented    Patient Active Problem List   Diagnosis     Benign essential hypertension     Obesity (BMI 35.0-39.9) with comorbidity (H)     History reviewed. No pertinent surgical history.    Social History     Tobacco Use     Smoking status: Never Smoker     Smokeless tobacco: Never Used   Substance Use Topics     Alcohol use: No     History reviewed. No pertinent family history.      Current Outpatient Medications   Medication Sig Dispense Refill     cyclobenzaprine (FLEXERIL) 10 MG tablet Take 1 tablet (10 mg) by mouth nightly as needed for muscle spasms 30 tablet 1     ibuprofen ([facility] IBUPROFEN) 200 MG tablet Take 3 tablets (600 mg) by mouth every 6 hours as needed for mild pain 100 tablet 1     lisinopril (PRINIVIL/ZESTRIL) 40 MG tablet Take 1 tablet (40 mg) by mouth daily 90 tablet 3     Allergies   Allergen Reactions     Penicillins Rash     Recent Labs   Lab Test 08/14/18  2110 02/13/18  0832 09/27/17  0854 09/15/16  1955   LDL  --  108* 108*  --    HDL  --  34* 38*  --    TRIG  --  109 142  --    ALT  --  52  --  30   CR 0.94 0.94 0.99 0.86  "  GFRESTIMATED 88 88 83 >90  Non  GFR Calc     GFRESTBLACK >90 >90 >90 >90  African American GFR Calc     POTASSIUM 3.6 4.2 4.4 3.7   TSH  --   --  3.16  --       BP Readings from Last 3 Encounters:   03/06/19 128/78   12/03/18 (!) 149/99   08/14/18 148/87    Wt Readings from Last 3 Encounters:   03/06/19 (!) 151 kg (333 lb)   12/03/18 146 kg (321 lb 12.8 oz)   08/14/18 144.2 kg (318 lb)                    Reviewed and updated as needed this visit by clinical staff       Reviewed and updated as needed this visit by Provider         ROS:  Constitutional, HEENT, cardiovascular, pulmonary, gi and gu systems are negative, except as otherwise noted.    OBJECTIVE:     /78 (BP Location: Right arm, Cuff Size: Adult Large)   Pulse 60   Temp 99.2  F (37.3  C) (Tympanic)   Resp 18   Ht 1.956 m (6' 5\")   Wt (!) 151 kg (333 lb)   BMI 39.49 kg/m    Body mass index is 39.49 kg/m .  GENERAL: healthy, alert and no distress  EYES: Eyes grossly normal to inspection, PERRL and conjunctivae and sclerae normal  HENT: ear canals and TM's normal, nose and mouth without ulcers or lesions  NECK: no adenopathy, no asymmetry, masses, or scars and thyroid normal to palpation  RESP: lungs clear to auscultation - no rales, rhonchi or wheezes  CV: regular rate and rhythm, normal S1 S2, no S3 or S4, no murmur, click or rub, no peripheral edema and peripheral pulses strong  ABDOMEN: tenderness LUQ  MS: no gross musculoskeletal defects noted, no edema  SKIN: no suspicious lesions or rashes  NEURO: Normal strength and tone, mentation intact and speech normal  PSYCH: mentation appears normal, affect normal/bright    Results for orders placed or performed in visit on 03/06/19   CBC with platelets differential   Result Value Ref Range    WBC 8.5 4.0 - 11.0 10e9/L    RBC Count 5.38 4.4 - 5.9 10e12/L    Hemoglobin 15.3 13.3 - 17.7 g/dL    Hematocrit 46.0 40.0 - 53.0 %    MCV 86 78 - 100 fl    MCH 28.4 26.5 - 33.0 pg    MCHC 33.3 " 31.5 - 36.5 g/dL    RDW 13.4 10.0 - 15.0 %    Platelet Count 173 150 - 450 10e9/L    % Neutrophils 71.1 %    % Lymphocytes 15.9 %    % Monocytes 11.4 %    % Eosinophils 1.4 %    % Basophils 0.2 %    Absolute Neutrophil 6.1 1.6 - 8.3 10e9/L    Absolute Lymphocytes 1.4 0.8 - 5.3 10e9/L    Absolute Monocytes 1.0 0.0 - 1.3 10e9/L    Absolute Eosinophils 0.1 0.0 - 0.7 10e9/L    Absolute Basophils 0.0 0.0 - 0.2 10e9/L    Diff Method Automated Method    Comprehensive metabolic panel   Result Value Ref Range    Sodium 138 133 - 144 mmol/L    Potassium 5.0 3.4 - 5.3 mmol/L    Chloride 106 94 - 109 mmol/L    Carbon Dioxide 28 20 - 32 mmol/L    Anion Gap 4 3 - 14 mmol/L    Glucose 102 (H) 70 - 99 mg/dL    Urea Nitrogen 18 7 - 30 mg/dL    Creatinine 1.08 0.66 - 1.25 mg/dL    GFR Estimate 83 >60 mL/min/[1.73_m2]    GFR Estimate If Black >90 >60 mL/min/[1.73_m2]    Calcium 8.8 8.5 - 10.1 mg/dL    Bilirubin Total 0.9 0.2 - 1.3 mg/dL    Albumin 4.1 3.4 - 5.0 g/dL    Protein Total 7.6 6.8 - 8.8 g/dL    Alkaline Phosphatase 58 40 - 150 U/L    ALT 31 0 - 70 U/L    AST 15 0 - 45 U/L   Lipase   Result Value Ref Range    Lipase 141 73 - 393 U/L         ASSESSMENT/PLAN:   (K57.32) Diverticulitis of colon  (primary encounter diagnosis)  Comment: Patient has history of diverticulitis symptoms very similar no fever no nausea no vomiting stooling normal.  On exam mild lower left quadrant pain we will treat for diverticulitis white blood cells within normal limits.  If patient not improving after a couple days of treatment should be re-seen if pain gets worse fevers nausea or vomiting patient should be re-seen and would recommend CT scan  Plan: metroNIDAZOLE (FLAGYL) 500 MG tablet,         ciprofloxacin (CIPRO) 500 MG tablet,         ondansetron (ZOFRAN-ODT) 4 MG ODT tab,         GASTROENTEROLOGY ADULT REF CONSULT ONLY      (R10.32) LLQ abdominal pain  Comment:   Plan: CBC with platelets differential, Comprehensive         metabolic panel,  Lipase, Tissue         transglutaminase zuleyma IgA and IgG      (K58.0) Irritable bowel syndrome with diarrhea  Comment: Patient has history of irregular bowel movements.  Will test for celiac's has been there for the last couple of years does have new onset of diverticulitis with questionable irritable bowel syndrome.  Will have him follow-up gastroenterology.  Plan: GASTROENTEROLOGY ADULT REF CONSULT ONLY              JARETT Lilly [facility]

## 2019-03-06 NOTE — PATIENT INSTRUCTIONS
Discussed results of evaluation and tests today.   Observe symptoms x 12-24 hours. To ER if significant increase in symptoms or concerning symptoms occur. Symptomatic care discussed.      Patient Education     Diverticulitis    Some people get pouches along the wall of the colon as they get older. The pouches, called diverticuli, usually cause no symptoms. If the pouches become blocked, you can get an infection. This infection is called diverticulitis. It causes pain in your lower abdomen and fever. If not treated, it can become a serious condition, causing an abscess to form inside the pouch. The abscess may block the intestinal tract even or rupture, spreading infection throughout the abdomen.  When treatment is started early, oral antibiotics alone may be enough to cure diverticulitis. This method is tried first. But, if you don't improve or if your condition gets worse while using oral antibiotics, you may need to be admitted to the hospital for IV antibiotics. Severe cases may require surgery.  Home care  The following guidelines will help you care for yourself at home:    During the acute illness, rest and follow your healthcare provider's instructions about diet. Sometimes you will need to follow a clear liquid diet to rest your bowel. Once your symptoms are better, you may be told to follow a low-fiber diet for some time. Include foods like:  ? Flake cereal, mashed potatoes, pancakes, waffles, pasta, white bread, rice, applesauce, bananas, eggs, fish, poultry, tofu, and cooked soft vegetables    Take antibiotics exactly as instructed. Don't miss any doses or stop taking the medication, even if you feel better.    Monitor your temperature and tell your healthcare provider if you have rising temperatures.  Preventing future attacks  Once you have an episode of diverticulitis, you are at risk for having it again. After you have recovered from this episode, you may be able to lower your risk by eating a  high-fiber diet (20 gm/day to 35 gm/day of fiber). This cleans out the colon pouches that already exist and may prevent new ones from forming. Foods high in fiber include fresh fruits and edible peelings, raw or lightly cooked vegetables, whole grain cereals and breads, dried beans and peas, and bran.  Other steps that can help prevent future attacks include:    Take your medicines, such as antibiotics, as your healthcare provider says.    Drink 6 to 8 glasses of water every day, unless told otherwise.    Use a heating pad or hot water bottle to help abdominal cramping or pain.    Begin an exercise program. Ask your healthcare provider how to get started. You can benefit from simple activities such as walking or gardening.    Treat diarrhea with a bland diet. Start with liquids only; then slowly add fiber over time.    Watch for changes in your bowel movements (constipation to diarrhea). Avoid constipation by eating a high fiber diet and taking a stool softener if needed.    Get plenty of rest and sleep.  Follow-up care  Follow up with your healthcare provider as advised or sooner if you are not getting better in the next 2 days.  When to seek medical advice  Call your healthcare provider right away if any of these occur:    Fever of 100.4 F (38 C) or higher, or as directed by your healthcare provider    Repeated vomiting or swelling of the abdomen    Weakness, dizziness, light-headedness    Pain in your abdomen that gets worse, severe, or spreads to your back    Pain that moves to the right lower abdomen    Rectal bleeding (stools that are red, black or maroon color)    Unexpected vaginal bleeding  Date Last Reviewed: 9/1/2016 2000-2018 [employer]. [address] [place] [address], [place] [address]. All rights reserved. This information is not intended as a substitute for professional medical care. Always follow your healthcare professional's instructions.           Patient Education     Discharge  Instructions for Diverticulitis  You have been diagnosed with diverticulitis. This is a condition in which small pouches form in your colon (large intestine) and become inflamed or infected. Follow the guidelines below for home care.  As you recover  Tips for recovery include:    Eat a low-fiber diet. Your healthcare provider may advise a liquid diet. This gives your bowel a chance to rest so that it can recover.    Foods to include: flake cereal, mashed potatoes, pancakes, waffles, pasta, white bread, rice, applesauce, bananas, eggs, fish, poultry, tofu, and well-cooked vegetables    Take your medicines as directed. Do not stop taking the medicines, even if you feel better.    Monitor your temperature and report any rising temperature to your healthcare provider.    Take antibiotics exactly as directed. Do not miss any and keep taking them even if you feel better.     Drink 6 to 8 glasses of water every day, unless directed otherwise.    Use a heating pad or hot water bottle to reduce abdominal cramping or pain.  Preventing diverticulitis in the future  Tips for prevention include:    Eat a high-fiber diet. Fiber adds bulk to the stool so that it passes through the large intestine more easily.    Keep drinking 6 to 8 glasses of water every day, unless directed otherwise.    Begin an exercise program. Ask your healthcare provider how to get started. You can benefit from simple activities such as walking or gardening.    Treat diarrhea with a bland diet. Start with liquids only, then slowly add fiber over time.    Watch for changes in your bowel movements (constipation to diarrhea).    Avoid constipation with fiber and add a stool softener if needed.     Get plenty of rest and sleep.  Follow-up care  Make a follow-up appointment as directed by our staff.  When to call your healthcare provider  Call your healthcare provider immediately if you have any of the following:    Fever of 100.4 F (38.0 C) or higher, or as  directed by your healthcare provider    Chills    Severe cramps in the belly, most commonly the lower left side    Tenderness in the belly, most commonly the lower left side    Nausea and vomiting    Bleeding from your rectum   Date Last Reviewed: 7/1/2016 2000-2018 [employer] [address] [place] [address]. All rights reserved. This information is not intended as a substitute for professional medical care. Always follow your healthcare professional's instructions.           Patient Education     What is Irritable Bowel Syndrome (IBS)?     Muscle contraction moves food through the digestive tract.      People who have irritable bowel syndrome (IBS) have digestive tracts that react abnormally to certain substances or to stress. This leads to symptoms like cramps, gas, bloating, pain, constipation, and diarrhea. Sometimes called  spastic colon,  IBS is a common condition that is not a disease, but rather a group of symptoms that happen together.  IBS--a motility problem  The muscle movement that passes food through the digestive tract is called motility. When you have IBS, the normal motility of the digestive tract (especially the colon) is disrupted. Motility may speed up, slow down, or become irregular. If stool passes too quickly through the colon, not enough water is absorbed from it. Loose, watery stools (diarrhea) can result. If stool passes through the colon too slowly, too much water is absorbed and the stool becomes hard and dry (constipation). Also, stool and gas may back up and cause painful pressure and cramping. There is no single test that can diagnose IBS. It is a group of symptoms that help your healthcare provider with the diagnosis. Often multiple blood, stool, radiologic tests, or even colonoscopy are performed in the evaluation of people suspected to have IBS. These are done primarily to make sure that there are no other illnesses that can account for your  "symptoms.   What causes IBS?  A great deal of research has been done on IBS, but the cause is still not known. Some of the possible factors include:     Smoking, eating certain foods, or drinking alcohol or caffeinated drinks can cause, or \"trigger,\" symptoms of IBS.    Although no one knows for sure, IBS may be caused by a problem with the nerves or muscles in your digestive tract.    There is also some evidence that certain bacteria found after a severe gastroinstestinal infection in the small intestine and colon may cause IBS.    While stress and anxiety worsen the symptoms of IBS, it is not believed to be the cause.   What you can do  Recommendations include:    Certain medicines may help regulate the working of your digestive tract. Your healthcare provider may prescribe one or more for you.    Medicine can t cure IBS, but it may help manage the symptoms.    Because some medicines may make IBS worse, don t take any medicine, especially laxatives, unless your healthcare provider prescribes it for you.    Your healthcare provider may suggest some lifestyle changes to help control your IBS. Two of the most important are changing your diet and managing stress. If diet changes are suggested, ask for nutritional guidance from a dietitian so you maintain a healthy nutritional balance in your food intake.   Date Last Reviewed: 7/1/2016 2000-2018 [employer]. [address] [place] [address], [place] [address]. All rights reserved. This information is not intended as a substitute for professional medical care. Always follow your healthcare professional's instructions.           "

## 2019-03-06 NOTE — LETTER
[facility]  [address] [place] [id number]  Phone: [phone]  Fax: [phone]    March 6, 2019        Rashaun Camara  [address] [place] [id number]          To whom it may concern:    RE: Rashaun Camara    Patient was seen and treated today at our clinic and missed work.    Patient can return to work when he is fever free and symptoms have improved.     Please contact me for questions or concerns.      Sincerely,        JARETT Lilly CNP

## 2019-03-06 NOTE — LETTER
March 8, 2019      Rashaun Camara  [address] [address]        Dear ,    We are writing to inform you of your test results.    Your test results of celiac testing were  negative.    Resulted Orders   CBC with platelets differential   Result Value Ref Range    WBC 8.5 4.0 - 11.0 10e9/L    RBC Count 5.38 4.4 - 5.9 10e12/L    Hemoglobin 15.3 13.3 - 17.7 g/dL    Hematocrit 46.0 40.0 - 53.0 %    MCV 86 78 - 100 fl    MCH 28.4 26.5 - 33.0 pg    MCHC 33.3 31.5 - 36.5 g/dL    RDW 13.4 10.0 - 15.0 %    Platelet Count 173 150 - 450 10e9/L    % Neutrophils 71.1 %    % Lymphocytes 15.9 %    % Monocytes 11.4 %    % Eosinophils 1.4 %    % Basophils 0.2 %    Absolute Neutrophil 6.1 1.6 - 8.3 10e9/L    Absolute Lymphocytes 1.4 0.8 - 5.3 10e9/L    Absolute Monocytes 1.0 0.0 - 1.3 10e9/L    Absolute Eosinophils 0.1 0.0 - 0.7 10e9/L    Absolute Basophils 0.0 0.0 - 0.2 10e9/L    Diff Method Automated Method    Comprehensive metabolic panel   Result Value Ref Range    Sodium 138 133 - 144 mmol/L    Potassium 5.0 3.4 - 5.3 mmol/L    Chloride 106 94 - 109 mmol/L    Carbon Dioxide 28 20 - 32 mmol/L    Anion Gap 4 3 - 14 mmol/L    Glucose 102 (H) 70 - 99 mg/dL      Comment:      Non Fasting    Urea Nitrogen 18 7 - 30 mg/dL    Creatinine 1.08 0.66 - 1.25 mg/dL    GFR Estimate 83 >60 mL/min/[1.73_m2]      Comment:      Non  GFR Calc  Starting 12/18/2018, serum creatinine based estimated GFR (eGFR) will be   calculated using the Chronic Kidney Disease Epidemiology Collaboration   (CKD-EPI) equation.      GFR Estimate If Black >90 >60 mL/min/[1.73_m2]      Comment:       GFR Calc  Starting 12/18/2018, serum creatinine based estimated GFR (eGFR) will be   calculated using the Chronic Kidney Disease Epidemiology Collaboration   (CKD-EPI) equation.      Calcium 8.8 8.5 - 10.1 mg/dL    Bilirubin Total 0.9 0.2 - 1.3 mg/dL    Albumin 4.1 3.4 - 5.0 g/dL    Protein Total 7.6 6.8 - 8.8 g/dL     Alkaline Phosphatase 58 40 - 150 U/L    ALT 31 0 - 70 U/L    AST 15 0 - 45 U/L   Lipase   Result Value Ref Range    Lipase 141 73 - 393 U/L   Tissue transglutaminase zuleyma IgA and IgG   Result Value Ref Range    Tissue Transglutaminase Antibody IgA 1 <7 U/mL      Comment:      Negative  The tTG-IgA assay has limited utility for patients with decreased levels of   IgA. Screening for celiac disease should include IgA testing to rule out   selective IgA deficiency and to guide selection and interpretation of   serological testing. tTG-IgG testing may be positive in celiac disease   patients with IgA deficiency.      Tissue Transglutaminase Zuleyma IgG <1 <7 U/mL      Comment:      Negative   If you have any questions or concerns, please call the clinic at the number listed above.       Sincerely,        JARETT Lilly CNP/kaf

## 2019-03-06 NOTE — TELEPHONE ENCOUNTER
"Requested Prescriptions   Pending Prescriptions Disp Refills     lisinopril (PRINIVIL/ZESTRIL) 40 MG tablet 90 tablet 3     Sig: Take 1 tablet (40 mg) by mouth daily    ACE Inhibitors (Including Combos) Protocol Failed - 3/6/2019  9:25 AM       Failed - Recent (12 mo) or future (30 days) visit within the authorizing provider's specialty    Patient had office visit in the last 12 months or has a visit in the next 30 days with authorizing provider or within the authorizing provider's specialty.  See \"Patient Info\" tab in inbasket, or \"Choose Columns\" in Meds & Orders section of the refill encounter.             Passed - Blood pressure under 140/90 in past 12 months    BP Readings from Last 3 Encounters:   03/06/19 128/78   12/03/18 (!) 149/99   08/14/18 148/87                Passed - Medication is active on med list       Passed - Patient is age 18 or older       Passed - Normal serum creatinine on file in past 12 months    Recent Labs   Lab Test 08/14/18 2110   CR 0.94            Passed - Normal serum potassium on file in past 12 months    Recent Labs   Lab Test 08/14/18  2110   POTASSIUM 3.6             Last Written Prescription Date:  2/13/18  Last Fill Quantity: 90,  # refills: 3   Last office visit: No previous visit found with prescribing provider:  TODAY  Alexus Chavez   Future Office Visit:      "

## 2019-03-07 ENCOUNTER — TELEPHONE (OUTPATIENT)
Dept: FAMILY MEDICINE | Facility: CLINIC | Age: 44
End: 2019-03-07

## 2019-03-07 LAB
TTG IGA SER-ACNC: 1 U/ML
TTG IGG SER-ACNC: <1 U/ML

## 2019-03-08 NOTE — RESULT ENCOUNTER NOTE
Please Notify Rashaun  of test results celiac testing was negative.  Work note was left at the .     Alexus Chavez CNP

## 2019-03-28 ENCOUNTER — HOSPITAL ENCOUNTER (EMERGENCY)
Facility: CLINIC | Age: 44
Discharge: STILL ADMITTED OR ADMINISTRATIVE | End: 2019-03-28
Payer: COMMERCIAL

## 2019-03-28 ENCOUNTER — OFFICE VISIT (OUTPATIENT)
Dept: FAMILY MEDICINE | Facility: CLINIC | Age: 44
End: 2019-03-28
Payer: COMMERCIAL

## 2019-03-28 VITALS
WEIGHT: 315 LBS | SYSTOLIC BLOOD PRESSURE: 128 MMHG | RESPIRATION RATE: 16 BRPM | BODY MASS INDEX: 37.19 KG/M2 | DIASTOLIC BLOOD PRESSURE: 78 MMHG | TEMPERATURE: 97.5 F | HEIGHT: 77 IN | HEART RATE: 67 BPM | OXYGEN SATURATION: 98 %

## 2019-03-28 DIAGNOSIS — R10.32 LEFT LOWER QUADRANT PAIN: Primary | ICD-10-CM

## 2019-03-28 PROCEDURE — 99215 OFFICE O/P EST HI 40 MIN: CPT | Performed by: NURSE PRACTITIONER

## 2019-03-28 ASSESSMENT — MIFFLIN-ST. JEOR: SCORE: 2541

## 2019-03-28 NOTE — PROGRESS NOTES
SUBJECTIVE:   Rashaun Camara is a 43 year old male who presents to clinic today for the following health issues:      Abdominal Pain      Duration: started getting worse a few days ago.     Description (location/character/radiation): low middle abdomen        Associated flank pain: None    Intensity:  severe    Accompanying signs and symptoms:        Fever/Chills: YES- sweats        Gas/Bloating: YES       Nausea/vomitting: no        Diarrhea: no        Dysuria or Hematuria: no     History (previous similar pain/trauma/previous testing): diverticulitis; saw Alexus Chavez NP on 3/6/19 and given 7 day cipro and flagyl.     Precipitating or alleviating factors:       Pain worse with eating/BM/urination: None        Pain relieved by BM: just momentarily     Therapies tried and outcome: Cipro- 7 days supply; thought he was catching it early therefore not doing a 10 day supply. Was feeling good afterwards but sx's came back. Also took Flagyl. Completed both antibiotics.     LMP:  not applicable          Problem list and histories reviewed & adjusted, as indicated.  Additional history: as documented    Patient Active Problem List   Diagnosis     Benign essential hypertension     Obesity (BMI 35.0-39.9) with comorbidity (H)     History reviewed. No pertinent surgical history.    Social History     Tobacco Use     Smoking status: Never Smoker     Smokeless tobacco: Never Used   Substance Use Topics     Alcohol use: No     History reviewed. No pertinent family history.      Current Outpatient Medications   Medication Sig Dispense Refill     lisinopril (PRINIVIL/ZESTRIL) 40 MG tablet Take 1 tablet (40 mg) by mouth daily 90 tablet 0     cyclobenzaprine (FLEXERIL) 10 MG tablet Take 1 tablet (10 mg) by mouth nightly as needed for muscle spasms (Patient not taking: Reported on 3/28/2019) 30 tablet 1     ibuprofen ([facility] IBUPROFEN) 200 MG tablet Take 3 tablets (600 mg) by mouth every 6 hours as needed for mild pain (Patient not  "taking: Reported on 3/28/2019) 100 tablet 1     ondansetron (ZOFRAN-ODT) 4 MG ODT tab Take 1 tablet (4 mg) by mouth every 8 hours as needed for nausea (Patient not taking: Reported on 3/28/2019) 20 tablet 1     Allergies   Allergen Reactions     Penicillins Rash     Labs reviewed in EPIC    Reviewed and updated as needed this visit by clinical staff  Tobacco  Allergies  Meds  Med Hx  Surg Hx  Fam Hx  Soc Hx      Reviewed and updated as needed this visit by Provider         ROS:  Constitutional, HEENT, cardiovascular, pulmonary, GI, , musculoskeletal, neuro, skin, endocrine and psych systems are negative, except as otherwise noted.    OBJECTIVE:     /78   Pulse 67   Temp 97.5  F (36.4  C) (Tympanic)   Resp 16   Ht 1.956 m (6' 5\")   Wt (!) 152.9 kg (337 lb)   SpO2 98%   BMI 39.96 kg/m    Body mass index is 39.96 kg/m .   GENERAL: healthy, alert and no distress, nontoxic in appearance  EYES: Eyes grossly normal to inspection, PERRL and conjunctivae and sclerae normal  HENT: ear canals and TM's normal, nose and mouth without ulcers or lesions  NECK: no adenopathy, supple wiyh full ROM  RESP: lungs clear to auscultation - no rales, rhonchi or wheezes  CV: regular rate and rhythm, normal S1 S2, no S3 or S4, no murmur, click or rub, no peripheral edema   ABDOMEN: soft, LLQ pain with palpation and a generalized pain, no hepatosplenomegaly, no masses and bowel sounds normal  MS: no gross musculoskeletal defects noted, no edema    Diagnostic Test Results:  No results found for this or any previous visit (from the past 24 hour(s)).    ASSESSMENT/PLAN:   Pt has failed outpt management and never really got completely better and now is getting worse. LLQ pain but it also goes across the entire lower abdomen and he states he feels very bloated and makes frequent trips to the bathroom.  Problem List Items Addressed This Visit     None      Visit Diagnoses     Left lower quadrant pain    -  Primary       "         Patient Instructions   Go directly to the [place] [facility] for further evaluation. They are expecting you.      JARETT Barahona CNP  [facility]

## 2019-03-28 NOTE — PATIENT INSTRUCTIONS
Go directly to the [facility] for further evaluation. They are expecting you.

## 2019-06-09 ENCOUNTER — APPOINTMENT (OUTPATIENT)
Dept: CT IMAGING | Facility: CLINIC | Age: 44
End: 2019-06-09
Attending: FAMILY MEDICINE
Payer: OTHER MISCELLANEOUS

## 2019-06-09 ENCOUNTER — HOSPITAL ENCOUNTER (EMERGENCY)
Facility: CLINIC | Age: 44
Discharge: HOME | End: 2019-06-09
Attending: FAMILY MEDICINE | Admitting: FAMILY MEDICINE
Payer: OTHER MISCELLANEOUS

## 2019-06-09 VITALS
SYSTOLIC BLOOD PRESSURE: 130 MMHG | WEIGHT: 315 LBS | DIASTOLIC BLOOD PRESSURE: 83 MMHG | OXYGEN SATURATION: 97 % | TEMPERATURE: 98.1 F | RESPIRATION RATE: 16 BRPM | BODY MASS INDEX: 38.36 KG/M2 | HEIGHT: 76 IN | HEART RATE: 62 BPM

## 2019-06-09 DIAGNOSIS — K42.9 UMBILICAL HERNIA WITHOUT OBSTRUCTION AND WITHOUT GANGRENE: ICD-10-CM

## 2019-06-09 LAB
ALBUMIN SERPL-MCNC: 3.8 G/DL (ref 3.4–5)
ALBUMIN UR-MCNC: NEGATIVE MG/DL
ALP SERPL-CCNC: 62 U/L (ref 40–150)
ALT SERPL W P-5'-P-CCNC: 42 U/L (ref 0–70)
ANION GAP SERPL CALCULATED.3IONS-SCNC: 5 MMOL/L (ref 3–14)
APPEARANCE UR: CLEAR
AST SERPL W P-5'-P-CCNC: 21 U/L (ref 0–45)
BASOPHILS # BLD AUTO: 0 10E9/L (ref 0–0.2)
BASOPHILS NFR BLD AUTO: 0.3 %
BILIRUB SERPL-MCNC: 0.3 MG/DL (ref 0.2–1.3)
BILIRUB UR QL STRIP: NEGATIVE
BUN SERPL-MCNC: 20 MG/DL (ref 7–30)
CALCIUM SERPL-MCNC: 8.6 MG/DL (ref 8.5–10.1)
CHLORIDE SERPL-SCNC: 110 MMOL/L (ref 94–109)
CO2 SERPL-SCNC: 27 MMOL/L (ref 20–32)
COLOR UR AUTO: YELLOW
CREAT SERPL-MCNC: 0.93 MG/DL (ref 0.66–1.25)
CRP SERPL-MCNC: 9.7 MG/L (ref 0–8)
DIFFERENTIAL METHOD BLD: NORMAL
EOSINOPHIL # BLD AUTO: 0.1 10E9/L (ref 0–0.7)
EOSINOPHIL NFR BLD AUTO: 1.8 %
ERYTHROCYTE [DISTWIDTH] IN BLOOD BY AUTOMATED COUNT: 12.9 % (ref 10–15)
GFR SERPL CREATININE-BSD FRML MDRD: >90 ML/MIN/{1.73_M2}
GLUCOSE SERPL-MCNC: 94 MG/DL (ref 70–99)
GLUCOSE UR STRIP-MCNC: NEGATIVE MG/DL
HCT VFR BLD AUTO: 43.1 % (ref 40–53)
HGB BLD-MCNC: 14 G/DL (ref 13.3–17.7)
HGB UR QL STRIP: NEGATIVE
IMM GRANULOCYTES # BLD: 0 10E9/L (ref 0–0.4)
IMM GRANULOCYTES NFR BLD: 0.4 %
KETONES UR STRIP-MCNC: NEGATIVE MG/DL
LACTATE BLD-SCNC: 0.7 MMOL/L (ref 0.7–2)
LEUKOCYTE ESTERASE UR QL STRIP: NEGATIVE
LIPASE SERPL-CCNC: 204 U/L (ref 73–393)
LYMPHOCYTES # BLD AUTO: 2.1 10E9/L (ref 0.8–5.3)
LYMPHOCYTES NFR BLD AUTO: 28.5 %
MCH RBC QN AUTO: 27.7 PG (ref 26.5–33)
MCHC RBC AUTO-ENTMCNC: 32.5 G/DL (ref 31.5–36.5)
MCV RBC AUTO: 85 FL (ref 78–100)
MONOCYTES # BLD AUTO: 0.7 10E9/L (ref 0–1.3)
MONOCYTES NFR BLD AUTO: 9.9 %
MUCOUS THREADS #/AREA URNS LPF: PRESENT /LPF
NEUTROPHILS # BLD AUTO: 4.3 10E9/L (ref 1.6–8.3)
NEUTROPHILS NFR BLD AUTO: 59.1 %
NITRATE UR QL: NEGATIVE
NRBC # BLD AUTO: 0 10*3/UL
NRBC BLD AUTO-RTO: 0 /100
PH UR STRIP: 5 PH (ref 5–7)
PLATELET # BLD AUTO: 183 10E9/L (ref 150–450)
POTASSIUM SERPL-SCNC: 4.3 MMOL/L (ref 3.4–5.3)
PROT SERPL-MCNC: 7.3 G/DL (ref 6.8–8.8)
RBC # BLD AUTO: 5.06 10E12/L (ref 4.4–5.9)
RBC #/AREA URNS AUTO: <1 /HPF (ref 0–2)
SODIUM SERPL-SCNC: 142 MMOL/L (ref 133–144)
SOURCE: ABNORMAL
SP GR UR STRIP: >1.035 (ref 1–1.03)
SQUAMOUS #/AREA URNS AUTO: <1 /HPF (ref 0–1)
UROBILINOGEN UR STRIP-MCNC: 0 MG/DL (ref 0–2)
WBC # BLD AUTO: 7.2 10E9/L (ref 4–11)
WBC #/AREA URNS AUTO: <1 /HPF (ref 0–5)

## 2019-06-09 PROCEDURE — 25000132 ZZH RX MED GY IP 250 OP 250 PS 637: Performed by: FAMILY MEDICINE

## 2019-06-09 PROCEDURE — 25000125 ZZHC RX 250: Performed by: FAMILY MEDICINE

## 2019-06-09 PROCEDURE — 83690 ASSAY OF LIPASE: CPT | Performed by: FAMILY MEDICINE

## 2019-06-09 PROCEDURE — 85025 COMPLETE CBC W/AUTO DIFF WBC: CPT | Performed by: FAMILY MEDICINE

## 2019-06-09 PROCEDURE — 80053 COMPREHEN METABOLIC PANEL: CPT | Performed by: FAMILY MEDICINE

## 2019-06-09 PROCEDURE — 25000131 ZZH RX MED GY IP 250 OP 636 PS 637: Performed by: FAMILY MEDICINE

## 2019-06-09 PROCEDURE — 25800030 ZZH RX IP 258 OP 636: Performed by: FAMILY MEDICINE

## 2019-06-09 PROCEDURE — 96374 THER/PROPH/DIAG INJ IV PUSH: CPT | Mod: 59 | Performed by: FAMILY MEDICINE

## 2019-06-09 PROCEDURE — 99285 EMERGENCY DEPT VISIT HI MDM: CPT | Mod: 25 | Performed by: FAMILY MEDICINE

## 2019-06-09 PROCEDURE — 96361 HYDRATE IV INFUSION ADD-ON: CPT | Performed by: FAMILY MEDICINE

## 2019-06-09 PROCEDURE — 86140 C-REACTIVE PROTEIN: CPT | Performed by: FAMILY MEDICINE

## 2019-06-09 PROCEDURE — 96375 TX/PRO/DX INJ NEW DRUG ADDON: CPT | Performed by: FAMILY MEDICINE

## 2019-06-09 PROCEDURE — 81001 URINALYSIS AUTO W/SCOPE: CPT | Performed by: FAMILY MEDICINE

## 2019-06-09 PROCEDURE — 99285 EMERGENCY DEPT VISIT HI MDM: CPT | Mod: Z6 | Performed by: FAMILY MEDICINE

## 2019-06-09 PROCEDURE — 25000128 H RX IP 250 OP 636: Performed by: FAMILY MEDICINE

## 2019-06-09 PROCEDURE — 87086 URINE CULTURE/COLONY COUNT: CPT | Performed by: FAMILY MEDICINE

## 2019-06-09 PROCEDURE — 74177 CT ABD & PELVIS W/CONTRAST: CPT

## 2019-06-09 PROCEDURE — 83605 ASSAY OF LACTIC ACID: CPT | Performed by: FAMILY MEDICINE

## 2019-06-09 RX ORDER — IOPAMIDOL 755 MG/ML
100 INJECTION, SOLUTION INTRAVASCULAR ONCE
Status: COMPLETED | OUTPATIENT
Start: 2019-06-09 | End: 2019-06-09

## 2019-06-09 RX ORDER — OXYCODONE AND ACETAMINOPHEN 5; 325 MG/1; MG/1
1-2 TABLET ORAL EVERY 4 HOURS PRN
Qty: 12 TABLET | Refills: 0 | Status: ON HOLD | OUTPATIENT
Start: 2019-06-09 | End: 2019-06-25

## 2019-06-09 RX ORDER — KETOROLAC TROMETHAMINE 15 MG/ML
10 INJECTION, SOLUTION INTRAMUSCULAR; INTRAVENOUS ONCE
Status: COMPLETED | OUTPATIENT
Start: 2019-06-09 | End: 2019-06-09

## 2019-06-09 RX ORDER — ONDANSETRON 2 MG/ML
4 INJECTION INTRAMUSCULAR; INTRAVENOUS
Status: COMPLETED | OUTPATIENT
Start: 2019-06-09 | End: 2019-06-09

## 2019-06-09 RX ORDER — SODIUM CHLORIDE, SODIUM LACTATE, POTASSIUM CHLORIDE, CALCIUM CHLORIDE 600; 310; 30; 20 MG/100ML; MG/100ML; MG/100ML; MG/100ML
1000 INJECTION, SOLUTION INTRAVENOUS CONTINUOUS
Status: DISCONTINUED | OUTPATIENT
Start: 2019-06-09 | End: 2019-06-09 | Stop reason: HOSPADM

## 2019-06-09 RX ORDER — HYDROMORPHONE HYDROCHLORIDE 1 MG/ML
0.5 INJECTION, SOLUTION INTRAMUSCULAR; INTRAVENOUS; SUBCUTANEOUS
Status: COMPLETED | OUTPATIENT
Start: 2019-06-09 | End: 2019-06-09

## 2019-06-09 RX ORDER — ONDANSETRON 4 MG/1
4 TABLET, ORALLY DISINTEGRATING ORAL ONCE
Status: COMPLETED | OUTPATIENT
Start: 2019-06-09 | End: 2019-06-09

## 2019-06-09 RX ORDER — ONDANSETRON 4 MG/1
4 TABLET, ORALLY DISINTEGRATING ORAL EVERY 8 HOURS PRN
Qty: 20 TABLET | Refills: 0 | Status: SHIPPED | OUTPATIENT
Start: 2019-06-09 | End: 2019-07-03

## 2019-06-09 RX ORDER — OXYCODONE AND ACETAMINOPHEN 5; 325 MG/1; MG/1
2 TABLET ORAL ONCE
Status: COMPLETED | OUTPATIENT
Start: 2019-06-09 | End: 2019-06-09

## 2019-06-09 RX ADMIN — OXYCODONE HYDROCHLORIDE AND ACETAMINOPHEN 2 TABLET: 5; 325 TABLET ORAL at 20:16

## 2019-06-09 RX ADMIN — SODIUM CHLORIDE, POTASSIUM CHLORIDE, SODIUM LACTATE AND CALCIUM CHLORIDE 1000 ML: 600; 310; 30; 20 INJECTION, SOLUTION INTRAVENOUS at 18:15

## 2019-06-09 RX ADMIN — SODIUM CHLORIDE 74 ML: 9 INJECTION, SOLUTION INTRAVENOUS at 18:45

## 2019-06-09 RX ADMIN — KETOROLAC TROMETHAMINE 10 MG: 15 INJECTION, SOLUTION INTRAMUSCULAR; INTRAVENOUS at 19:26

## 2019-06-09 RX ADMIN — IOPAMIDOL 100 ML: 755 INJECTION, SOLUTION INTRAVENOUS at 18:45

## 2019-06-09 RX ADMIN — ONDANSETRON 4 MG: 4 TABLET, ORALLY DISINTEGRATING ORAL at 21:41

## 2019-06-09 RX ADMIN — HYDROMORPHONE HYDROCHLORIDE 0.5 MG: 1 INJECTION, SOLUTION INTRAMUSCULAR; INTRAVENOUS; SUBCUTANEOUS at 18:16

## 2019-06-09 RX ADMIN — ONDANSETRON 4 MG: 2 INJECTION INTRAMUSCULAR; INTRAVENOUS at 18:15

## 2019-06-09 ASSESSMENT — MIFFLIN-ST. JEOR: SCORE: 2438.94

## 2019-06-09 NOTE — ED AVS SNAPSHOT
[facility] Emergency Department  [address] [address]  Phone:  [phone]  Fax:  [phone]                                    Rashaun Camara   MRN: [id number]    Department:  [facility] Emergency Department   Date of Visit:  6/9/2019           After Visit Summary Signature Page    I have received my discharge instructions, and my questions have been answered. I have discussed any challenges I see with this plan with the nurse or doctor.    ..........................................................................................................................................  Patient/Patient Representative Signature      ..........................................................................................................................................  Patient Representative Print Name and Relationship to Patient    ..................................................               ................................................  Date                                   Time    ..........................................................................................................................................  Reviewed by Signature/Title    ...................................................              ..............................................  Date                                               Time          22EPIC Rev 08/18

## 2019-06-09 NOTE — ED PROVIDER NOTES
"  History     Chief Complaint   Patient presents with     Abdominal Pain     pain and bulging in umbilicus, also feels bloated     HPI  Rashaun Camara is a 43 year old male, past medical history significant for obesity and hypertension, presents to the emergency department concerns of abdominal pain.  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  This has persisted although fluctuated throughout the course of the day he put ice to the area and massaged it which was uncomfortable and seemed to improve things a little bit.  There is been no nausea or vomiting.  No fever chills or sweats.  He has had a \"normal\" bowel movement since the time of pain onset which did not change the periumbilical pain in distribution or character or intensity.  He notes no urinary tract symptoms such as frequency urgency or dysuria.  This feels distinctly different than from when he has had diverticulitis and he is concerned he might have a hernia.    Allergies:  Allergies   Allergen Reactions     Penicillins Rash       Problem List:    Patient Active Problem List    Diagnosis Date Noted     Obesity (BMI 35.0-39.9) with comorbidity (H) 12/03/2018     Priority: Medium     Benign essential hypertension 09/27/2017     Priority: Medium        Past Medical History:    No past medical history on file.    Past Surgical History:    No past surgical history on file.    Family History:    No family history on file.    Social History:  Marital Status:  Single [1]  Social History     Tobacco Use     Smoking status: Never Smoker     Smokeless tobacco: Never Used   Substance Use Topics     Alcohol use: No     Drug use: No        Medications:      oxyCODONE-acetaminophen (PERCOCET) 5-325 MG tablet   lisinopril (PRINIVIL/ZESTRIL) 40 MG tablet   ondansetron (ZOFRAN-ODT) 4 MG ODT tab         Review of Systems   All other systems " "reviewed and are negative.      Physical Exam   BP: 134/87  Pulse: 73  Temp: 98.1  F (36.7  C)  Resp: 16  Height: 193 cm (6' 4\")  Weight: 144.2 kg (318 lb)(stated)  SpO2: 95 %      Physical Exam   Constitutional: He is oriented to person, place, and time. He appears well-developed and well-nourished.   HENT:   Head: Normocephalic and atraumatic.   Right Ear: External ear normal.   Left Ear: External ear normal.   Nose: Nose normal.   Mouth/Throat: Oropharynx is clear and moist.   Eyes: Pupils are equal, round, and reactive to light. Conjunctivae and EOM are normal.   Neck: Normal range of motion. Neck supple.   Cardiovascular: Normal rate, regular rhythm, normal heart sounds and intact distal pulses.   Pulmonary/Chest: Effort normal and breath sounds normal.   Abdominal: Soft.   Obese difficult to examine abdomen.  Slightly decreased bowel sounds.  I can barely palpate a hernial defect in the umbilical area.  Tender.  There is no definite hernia to reduce.   Musculoskeletal: Normal range of motion.   Neurological: He is alert and oriented to person, place, and time.   Skin: Skin is warm. Capillary refill takes less than 2 seconds.   Psychiatric: He has a normal mood and affect. His behavior is normal.   Nursing note and vitals reviewed.      ED Course        Procedures               Critical Care time:  none               Results for orders placed or performed during the hospital encounter of 06/09/19 (from the past 24 hour(s))   CBC with platelets differential   Result Value Ref Range    WBC 7.2 4.0 - 11.0 10e9/L    RBC Count 5.06 4.4 - 5.9 10e12/L    Hemoglobin 14.0 13.3 - 17.7 g/dL    Hematocrit 43.1 40.0 - 53.0 %    MCV 85 78 - 100 fl    MCH 27.7 26.5 - 33.0 pg    MCHC 32.5 31.5 - 36.5 g/dL    RDW 12.9 10.0 - 15.0 %    Platelet Count 183 150 - 450 10e9/L    Diff Method Automated Method     % Neutrophils 59.1 %    % Lymphocytes 28.5 %    % Monocytes 9.9 %    % Eosinophils 1.8 %    % Basophils 0.3 %    % Immature " Granulocytes 0.4 %    Nucleated RBCs 0 0 /100    Absolute Neutrophil 4.3 1.6 - 8.3 10e9/L    Absolute Lymphocytes 2.1 0.8 - 5.3 10e9/L    Absolute Monocytes 0.7 0.0 - 1.3 10e9/L    Absolute Eosinophils 0.1 0.0 - 0.7 10e9/L    Absolute Basophils 0.0 0.0 - 0.2 10e9/L    Abs Immature Granulocytes 0.0 0 - 0.4 10e9/L    Absolute Nucleated RBC 0.0    CRP inflammation   Result Value Ref Range    CRP Inflammation 9.7 (H) 0.0 - 8.0 mg/L   Comprehensive metabolic panel   Result Value Ref Range    Sodium 142 133 - 144 mmol/L    Potassium 4.3 3.4 - 5.3 mmol/L    Chloride 110 (H) 94 - 109 mmol/L    Carbon Dioxide 27 20 - 32 mmol/L    Anion Gap 5 3 - 14 mmol/L    Glucose 94 70 - 99 mg/dL    Urea Nitrogen 20 7 - 30 mg/dL    Creatinine 0.93 0.66 - 1.25 mg/dL    GFR Estimate >90 >60 mL/min/[1.73_m2]    GFR Estimate If Black >90 >60 mL/min/[1.73_m2]    Calcium 8.6 8.5 - 10.1 mg/dL    Bilirubin Total 0.3 0.2 - 1.3 mg/dL    Albumin 3.8 3.4 - 5.0 g/dL    Protein Total 7.3 6.8 - 8.8 g/dL    Alkaline Phosphatase 62 40 - 150 U/L    ALT 42 0 - 70 U/L    AST 21 0 - 45 U/L   Lipase   Result Value Ref Range    Lipase 204 73 - 393 U/L   Lactic acid whole blood   Result Value Ref Range    Lactic Acid 0.7 0.7 - 2.0 mmol/L   CT Abdomen Pelvis w Contrast    Narrative    CT ABDOMEN PELVIS W CONTRAST 6/9/2019 6:53 PM    HISTORY: Abdominal pain.    TECHNIQUE: CT of the abdomen and pelvis is performed with 100 mL  Isovue-370 intravenously. Radiation dose for this scan was reduced  using automated exposure control, adjustment of the mA and/or kV  according to patient size, or iterative reconstruction technique.    COMPARISON: August 14, 2018.    FINDINGS:    Liver: Normal.  Gallbladder:Normal.  Pancreas:Normal.  Spleen:Normal.  Adrenals:Normal.  Ascites:None.    Kidneys: Small right renal cyst as before.  Bladder:Normal.  Pelvic free fluid:None.    Bowels: Colonic diverticulosis without evidence of diverticulitis.  No  evidence of  obstruction.  Appendix: Not well visualized but there are no findings suggestive of  appendicitis.    Abdominal or pelvic lymphadenopathy:None.    Miscellaneous findings: There is a periumbilical fat-containing hernia  with peripheral fluid, increased in prominence compared to prior exam.      Impression    IMPRESSION:    1. Periumbilical fat-containing hernia with peripheral fluid, possibly  inflamed.  2. Colonic diverticulosis without evidence of diverticulitis.    RAJWINDER PURDY MD   UA with Microscopic reflex to Culture   Result Value Ref Range    Color Urine Yellow     Appearance Urine Clear     Glucose Urine Negative NEG^Negative mg/dL    Bilirubin Urine Negative NEG^Negative    Ketones Urine Negative NEG^Negative mg/dL    Specific Gravity Urine >1.035 (H) 1.003 - 1.035    Blood Urine Negative NEG^Negative    pH Urine 5.0 5.0 - 7.0 pH    Protein Albumin Urine Negative NEG^Negative mg/dL    Urobilinogen mg/dL 0.0 0.0 - 2.0 mg/dL    Nitrite Urine Negative NEG^Negative    Leukocyte Esterase Urine Negative NEG^Negative    Source Midstream Urine     WBC Urine <1 0 - 5 /HPF    RBC Urine <1 0 - 2 /HPF    Squamous Epithelial /HPF Urine <1 0 - 1 /HPF    Mucous Urine Present (A) NEG^Negative /LPF       Medications   lactated ringers BOLUS 1,000 mL (0 mLs Intravenous Stopped 6/9/19 2017)     Followed by   lactated ringers infusion (has no administration in time range)   HYDROmorphone (PF) (DILAUDID) injection 0.5 mg (0.5 mg Intravenous Given 6/9/19 1816)   ondansetron (ZOFRAN) injection 4 mg (4 mg Intravenous Given 6/9/19 1815)   iopamidol (ISOVUE-370) solution 100 mL (100 mLs Intravenous Given 6/9/19 1845)   sodium chloride 0.9 % bag 500mL for CT scan flush use (74 mLs As instructed Given 6/9/19 1845)   ketorolac (TORADOL) injection 10 mg (10 mg Intravenous Given 6/9/19 1926)   oxyCODONE-acetaminophen (PERCOCET) 5-325 MG per tablet 2 tablet (2 tablets Oral Given 6/9/19 2016)     9:23 PM  I reviewed this patient and his  evaluation with on-call general surgery Dr. Trenton Akins.  He felt it would be safe to disposition the patient to home on oral pain medication for plans with follow-up in clinic with general surgery to discuss operative repair this week.  Dr. Akins is not in clinic this week and follow-up will need to be with 1 of his colleagues.  I discussed the evaluation and the recommendations of general surgery with the patient and his significant other and answered the questions.  He responded poorly to the initial pain medication (IV Dilaudid) but responded much better and was very comfortable after the use of oral Percocet.  Will plan for disposition to home with the plan as above.    Assessments & Plan (with Medical Decision Making)   Assessment and plan with medical decision making at the time stamp above.      Disclaimer: This note consists of symbols derived from keyboarding, dictation and/or voice recognition software. As a result, there may be errors in the script that have gone undetected. Please consider this when interpreting information found in this chart.      I have reviewed the nursing notes.    I have reviewed the findings, diagnosis, plan and need for follow up with the patient.          Medication List      Started    oxyCODONE-acetaminophen 5-325 MG tablet  Commonly known as:  PERCOCET  1-2 tablets, Oral, EVERY 4 HOURS PRN            Final diagnoses:   Umbilical hernia without obstruction and without gangrene       6/9/2019   [facility] EMERGENCY DEPARTMENT     Huy Herman MD  06/09/19 3725

## 2019-06-10 DIAGNOSIS — I10 BENIGN ESSENTIAL HYPERTENSION: ICD-10-CM

## 2019-06-10 RX ORDER — LISINOPRIL 40 MG/1
TABLET ORAL
Qty: 90 TABLET | Refills: 1 | Status: SHIPPED | OUTPATIENT
Start: 2019-06-10 | End: 2019-12-16

## 2019-06-10 NOTE — DISCHARGE INSTRUCTIONS
Workability completed.  You should not be lifting pushing pulling beyond the work restrictions outlined for you.  Consultation this week with general surgery to discuss operative repair of the umbilical hernia.  Percocet may be used for pain.  If you are feeling worse, develop nausea or vomiting, fever chills or sweats please return to the emergency department.

## 2019-06-11 LAB
BACTERIA SPEC CULT: NO GROWTH
Lab: NORMAL
SPECIMEN SOURCE: NORMAL

## 2019-06-11 NOTE — RESULT ENCOUNTER NOTE
Final urine culture report is NEGATIVE per [place] [facility] Result protocol.    If NEGATIVE result, no change in treatment, per [place] [facility] Result protocol.

## 2019-06-12 ENCOUNTER — OFFICE VISIT (OUTPATIENT)
Dept: SURGERY | Facility: CLINIC | Age: 44
End: 2019-06-12
Payer: OTHER MISCELLANEOUS

## 2019-06-12 VITALS
TEMPERATURE: 98.7 F | HEIGHT: 77 IN | HEART RATE: 54 BPM | SYSTOLIC BLOOD PRESSURE: 136 MMHG | WEIGHT: 315 LBS | BODY MASS INDEX: 37.19 KG/M2 | DIASTOLIC BLOOD PRESSURE: 87 MMHG

## 2019-06-12 DIAGNOSIS — K43.9 VENTRAL HERNIA WITHOUT OBSTRUCTION OR GANGRENE: Primary | ICD-10-CM

## 2019-06-12 DIAGNOSIS — K59.03 DRUG-INDUCED CONSTIPATION: ICD-10-CM

## 2019-06-12 PROCEDURE — 99203 OFFICE O/P NEW LOW 30 MIN: CPT | Performed by: SURGERY

## 2019-06-12 RX ORDER — ASPIRIN 81 MG
100 TABLET, DELAYED RELEASE (ENTERIC COATED) ORAL DAILY
Qty: 30 TABLET | Refills: 1 | Status: SHIPPED | OUTPATIENT
Start: 2019-06-12 | End: 2019-09-09

## 2019-06-12 RX ORDER — OXYCODONE AND ACETAMINOPHEN 5; 325 MG/1; MG/1
1 TABLET ORAL EVERY 6 HOURS PRN
Qty: 12 TABLET | Refills: 0 | Status: ON HOLD | OUTPATIENT
Start: 2019-06-12 | End: 2019-06-25

## 2019-06-12 ASSESSMENT — MIFFLIN-ST. JEOR: SCORE: 2454.82

## 2019-06-12 NOTE — LETTER
[facility]  [address] [address]  [phone]          June 12, 2019    RE:  Rashaun Camara                                                                                                                                                       [address] [facility] [place] [address]            To whom it may concern:    Rashaun Camara is under my professional care for ventral hernia.  He should remain on light duty (No lifting > 10lbs) until 4 weeks after surgery.  Surgery will be scheduled on June 25, 2019.      Sincerely,      Jens Renner MD

## 2019-06-12 NOTE — PROGRESS NOTES
43-year-old male has had a small mass at his umbilicus for many years however in the past week he has become increasingly painful after doing some heavy lifting at work.  Patient routinely lifts things that are greater than 100 pounds at work.  He was seen in the emergency room and diagnosed with an umbilical hernia with preperitoneal fat incarcerated.  He reports localized discomfort 2-3 out of 10 without radiation.  He denies fevers chills nausea and vomiting.    Patient Active Problem List   Diagnosis     Benign essential hypertension     Obesity (BMI 35.0-39.9) with comorbidity (H)       History reviewed. No pertinent past medical history.    History reviewed. No pertinent surgical history.    History reviewed. No pertinent family history.    Social History     Tobacco Use     Smoking status: Never Smoker     Smokeless tobacco: Never Used   Substance Use Topics     Alcohol use: No        History   Drug Use No       Current Outpatient Medications   Medication Sig Dispense Refill     docusate sodium (COLACE) 100 MG tablet Take 1 tablet (100 mg) by mouth daily 30 tablet 1     lisinopril (PRINIVIL/ZESTRIL) 40 MG tablet TAKE ONE TABLET BY MOUTH EVERY DAY 90 tablet 1     oxyCODONE-acetaminophen (PERCOCET) 5-325 MG tablet Take 1 tablet by mouth every 6 hours as needed for pain 12 tablet 0     oxyCODONE-acetaminophen (PERCOCET) 5-325 MG tablet Take 1-2 tablets by mouth every 4 hours as needed 12 tablet 0     ondansetron (ZOFRAN ODT) 4 MG ODT tab Take 1 tablet (4 mg) by mouth every 8 hours as needed (Patient not taking: Reported on 6/12/2019) 20 tablet 0     ondansetron (ZOFRAN-ODT) 4 MG ODT tab Take 1 tablet (4 mg) by mouth every 8 hours as needed for nausea (Patient not taking: Reported on 3/28/2019) 20 tablet 1       Allergies   Allergen Reactions     Penicillins Rash      CBC  Recent Labs   Lab Test 06/09/19  1758   WBC 7.2   RBC 5.06   HGB 14.0   HCT 43.1   MCV 85   MCH 27.7   MCHC 32.5   RDW 12.9           BMP  Recent Labs   Lab Test 06/09/19  1758      POTASSIUM 4.3   RADHA 8.6   CHLORIDE 110*   CO2 27   BUN 20   CR 0.93   GLC 94       LFTs  Recent Labs   Lab Test 06/09/19  1758   PROTTOTAL 7.3   ALBUMIN 3.8   BILITOTAL 0.3   ALKPHOS 62   AST 21   ALT 42   \    Results for orders placed or performed during the hospital encounter of 06/09/19   CT Abdomen Pelvis w Contrast    Narrative    CT ABDOMEN PELVIS W CONTRAST 6/9/2019 6:53 PM    HISTORY: Abdominal pain.    TECHNIQUE: CT of the abdomen and pelvis is performed with 100 mL  Isovue-370 intravenously. Radiation dose for this scan was reduced  using automated exposure control, adjustment of the mA and/or kV  according to patient size, or iterative reconstruction technique.    COMPARISON: August 14, 2018.    FINDINGS:    Liver: Normal.  Gallbladder:Normal.  Pancreas:Normal.  Spleen:Normal.  Adrenals:Normal.  Ascites:None.    Kidneys: Small right renal cyst as before.  Bladder:Normal.  Pelvic free fluid:None.    Bowels: Colonic diverticulosis without evidence of diverticulitis.  No  evidence of obstruction.  Appendix: Not well visualized but there are no findings suggestive of  appendicitis.    Abdominal or pelvic lymphadenopathy:None.    Miscellaneous findings: There is a periumbilical fat-containing hernia  with peripheral fluid, increased in prominence compared to prior exam.      Impression    IMPRESSION:    1. Periumbilical fat-containing hernia with peripheral fluid, possibly  inflamed.  2. Colonic diverticulosis without evidence of diverticulitis.    RAJWINDER PURDY MD     ROS  Constitutional - Denies fevers, weight loss, malaise, lethargy  Neuro - Denies tremors or seizures  Pulmon - Denies SOB, dyspnea, hemoptysis, chronic cough or use of an inhaler  CV - Denies CP, SOB, lower extremity edema, difficulty w/ stairs, has never used NTG  GI - Denies hematemesis, BRBPR, melena, chronic diarrhea or epigastric pain   - Denies hematuria, difficulty voiding,  "h/o STDs  Hematology - Denies blood clotting disorders, chronic anemias  Dermatology - No melanomas or skin cancers  Rheumatology - No h/o RA  Pysch - Denies depression, bipolar d/o or schizophrenia    Exam:/87 (BP Location: Right arm, Patient Position: Sitting, Cuff Size: Adult Large)   Pulse 54   Temp 98.7  F (37.1  C) (Oral)   Ht 1.956 m (6' 5\")   Wt 144.2 kg (318 lb)   BMI 37.71 kg/m      General - Alert and Oriented X4, NAD, well nourished  Lungs - Clear to auscultation bilaterally with good inspiratory effort, no tactile fremitus  CV - Heart RRR, no lift's, thrills, murmurs, rubs, or gallops. Carotid, radial, and femoral pulses 2+ bilaterally  Abdomen - Soft, non-tender, +BS, no hepatosplenomegaly, palpable nonreducible mass deep in the umbilicus slightly tender  Neuro - Full ROM, Strength 5/5 and major muscle groups, sensation intact  Extremities - No cyanosis, clubbing or edema    Assessment and plan: 43-year-old male with incarcerated umbilical hernia.  This is incarcerated with preperitoneal fat or omentum.  Given patient's active lifestyle and work, recommend a laparoscopic assisted open repair.  Risks benefits alternatives and complications were discussed with the patient including the possibility of infection bleeding or hernia recurrence.  Patient understood and wished to proceed. PATIENT IS CLEARED FOR SURGERY.    Jens Renner MD   "

## 2019-06-12 NOTE — NURSING NOTE
"Initial /87 (BP Location: Right arm, Patient Position: Sitting, Cuff Size: Adult Large)   Pulse 54   Temp 98.7  F (37.1  C) (Oral)   Ht 1.956 m (6' 5\")   Wt 144.2 kg (318 lb)   BMI 37.71 kg/m   Estimated body mass index is 37.71 kg/m  as calculated from the following:    Height as of this encounter: 1.956 m (6' 5\").    Weight as of this encounter: 144.2 kg (318 lb). .    Barbara Patel MA    "

## 2019-06-12 NOTE — LETTER
6/12/2019         RE: Rashaun Camara  [address] [place] [address]        Dear Colleague,    Thank you for referring your patient, Rashaun Camara, to the [facility]. Please see a copy of my visit note below.    43-year-old male has had a small mass at his umbilicus for many years however in the past week he has become increasingly painful after doing some heavy lifting at work.  Patient routinely lifts things that are greater than 100 pounds at work.  He was seen in the emergency room and diagnosed with an umbilical hernia with preperitoneal fat incarcerated.  He reports localized discomfort 2-3 out of 10 without radiation.  He denies fevers chills nausea and vomiting.    Patient Active Problem List   Diagnosis     Benign essential hypertension     Obesity (BMI 35.0-39.9) with comorbidity (H)       History reviewed. No pertinent past medical history.    History reviewed. No pertinent surgical history.    History reviewed. No pertinent family history.    Social History     Tobacco Use     Smoking status: Never Smoker     Smokeless tobacco: Never Used   Substance Use Topics     Alcohol use: No        History   Drug Use No       Current Outpatient Medications   Medication Sig Dispense Refill     docusate sodium (COLACE) 100 MG tablet Take 1 tablet (100 mg) by mouth daily 30 tablet 1     lisinopril (PRINIVIL/ZESTRIL) 40 MG tablet TAKE ONE TABLET BY MOUTH EVERY DAY 90 tablet 1     oxyCODONE-acetaminophen (PERCOCET) 5-325 MG tablet Take 1 tablet by mouth every 6 hours as needed for pain 12 tablet 0     oxyCODONE-acetaminophen (PERCOCET) 5-325 MG tablet Take 1-2 tablets by mouth every 4 hours as needed 12 tablet 0     ondansetron (ZOFRAN ODT) 4 MG ODT tab Take 1 tablet (4 mg) by mouth every 8 hours as needed (Patient not taking: Reported on 6/12/2019) 20 tablet 0     ondansetron (ZOFRAN-ODT) 4 MG ODT tab Take 1 tablet (4 mg) by mouth every 8 hours as needed for nausea (Patient not taking: Reported  on 3/28/2019) 20 tablet 1       Allergies   Allergen Reactions     Penicillins Rash      CBC  Recent Labs   Lab Test 06/09/19  1758   WBC 7.2   RBC 5.06   HGB 14.0   HCT 43.1   MCV 85   MCH 27.7   MCHC 32.5   RDW 12.9          BMP  Recent Labs   Lab Test 06/09/19  1758      POTASSIUM 4.3   RADHA 8.6   CHLORIDE 110*   CO2 27   BUN 20   CR 0.93   GLC 94       LFTs  Recent Labs   Lab Test 06/09/19  1758   PROTTOTAL 7.3   ALBUMIN 3.8   BILITOTAL 0.3   ALKPHOS 62   AST 21   ALT 42   \    Results for orders placed or performed during the hospital encounter of 06/09/19   CT Abdomen Pelvis w Contrast    Narrative    CT ABDOMEN PELVIS W CONTRAST 6/9/2019 6:53 PM    HISTORY: Abdominal pain.    TECHNIQUE: CT of the abdomen and pelvis is performed with 100 mL  Isovue-370 intravenously. Radiation dose for this scan was reduced  using automated exposure control, adjustment of the mA and/or kV  according to patient size, or iterative reconstruction technique.    COMPARISON: August 14, 2018.    FINDINGS:    Liver: Normal.  Gallbladder:Normal.  Pancreas:Normal.  Spleen:Normal.  Adrenals:Normal.  Ascites:None.    Kidneys: Small right renal cyst as before.  Bladder:Normal.  Pelvic free fluid:None.    Bowels: Colonic diverticulosis without evidence of diverticulitis.  No  evidence of obstruction.  Appendix: Not well visualized but there are no findings suggestive of  appendicitis.    Abdominal or pelvic lymphadenopathy:None.    Miscellaneous findings: There is a periumbilical fat-containing hernia  with peripheral fluid, increased in prominence compared to prior exam.      Impression    IMPRESSION:    1. Periumbilical fat-containing hernia with peripheral fluid, possibly  inflamed.  2. Colonic diverticulosis without evidence of diverticulitis.    RAJWINDER PURDY MD     ROS  Constitutional - Denies fevers, weight loss, malaise, lethargy  Neuro - Denies tremors or seizures  Pulmon - Denies SOB, dyspnea, hemoptysis, chronic cough  "or use of an inhaler  CV - Denies CP, SOB, lower extremity edema, difficulty w/ stairs, has never used NTG  GI - Denies hematemesis, BRBPR, melena, chronic diarrhea or epigastric pain   - Denies hematuria, difficulty voiding, h/o STDs  Hematology - Denies blood clotting disorders, chronic anemias  Dermatology - No melanomas or skin cancers  Rheumatology - No h/o RA  Pysch - Denies depression, bipolar d/o or schizophrenia    Exam:/87 (BP Location: Right arm, Patient Position: Sitting, Cuff Size: Adult Large)   Pulse 54   Temp 98.7  F (37.1  C) (Oral)   Ht 1.956 m (6' 5\")   Wt 144.2 kg (318 lb)   BMI 37.71 kg/m       General - Alert and Oriented X4, NAD, well nourished  Lungs - Clear to auscultation bilaterally with good inspiratory effort, no tactile fremitus  CV - Heart RRR, no lift's, thrills, murmurs, rubs, or gallops. Carotid, radial, and femoral pulses 2+ bilaterally  Abdomen - Soft, non-tender, +BS, no hepatosplenomegaly, palpable nonreducible mass deep in the umbilicus slightly tender  Neuro - Full ROM, Strength 5/5 and major muscle groups, sensation intact  Extremities - No cyanosis, clubbing or edema    Assessment and plan: 43-year-old male with incarcerated umbilical hernia.  This is incarcerated with preperitoneal fat or omentum.  Given patient's active lifestyle and work, recommend a laparoscopic assisted open repair.  Risks benefits alternatives and complications were discussed with the patient including the possibility of infection bleeding or hernia recurrence.  Patient understood and wished to proceed. PATIENT IS CLEARED FOR SURGERY.    Jens Renner MD     Again, thank you for allowing me to participate in the care of your patient.        Sincerely,        Jens Renner MD    "

## 2019-06-21 ENCOUNTER — TELEPHONE (OUTPATIENT)
Dept: SURGERY | Facility: CLINIC | Age: 44
End: 2019-06-21

## 2019-06-21 NOTE — TELEPHONE ENCOUNTER
Reason for Call:  Other     Detailed comments: Pt is scheduled for hernia surgery 06/25 - states it is a [facility] case. The records to get this approved were just mailed yesterday to his [facility] carrier - so this is delaying getting this approved. He is requesting a call to his  to get a verbal approval and verification of diagnosis.   [phone] ([employer] - work comp carrier)    Phone Number Patient can be reached at: Home number on file [phone] (home)    Best Time: Any    Can we leave a detailed message on this number? YES    Call taken on 6/21/2019 at 9:15 AM by Denise Behrendt

## 2019-06-24 ENCOUNTER — ANESTHESIA EVENT (OUTPATIENT)
Dept: SURGERY | Facility: CLINIC | Age: 44
End: 2019-06-24
Payer: OTHER MISCELLANEOUS

## 2019-06-25 ENCOUNTER — TELEPHONE (OUTPATIENT)
Dept: SURGERY | Facility: CLINIC | Age: 44
End: 2019-06-25

## 2019-06-25 ENCOUNTER — HOSPITAL ENCOUNTER (OUTPATIENT)
Facility: CLINIC | Age: 44
Discharge: HOME | End: 2019-06-25
Attending: SURGERY | Admitting: SURGERY
Payer: OTHER MISCELLANEOUS

## 2019-06-25 ENCOUNTER — ANESTHESIA (OUTPATIENT)
Dept: SURGERY | Facility: CLINIC | Age: 44
End: 2019-06-25
Payer: OTHER MISCELLANEOUS

## 2019-06-25 VITALS
OXYGEN SATURATION: 98 % | HEART RATE: 56 BPM | BODY MASS INDEX: 37.19 KG/M2 | WEIGHT: 315 LBS | HEIGHT: 77 IN | TEMPERATURE: 97.7 F | DIASTOLIC BLOOD PRESSURE: 85 MMHG | SYSTOLIC BLOOD PRESSURE: 130 MMHG | RESPIRATION RATE: 16 BRPM

## 2019-06-25 DIAGNOSIS — G89.18 POSTOPERATIVE PAIN: Primary | ICD-10-CM

## 2019-06-25 PROCEDURE — 25000125 ZZHC RX 250: Performed by: NURSE ANESTHETIST, CERTIFIED REGISTERED

## 2019-06-25 PROCEDURE — 25000125 ZZHC RX 250: Performed by: SURGERY

## 2019-06-25 PROCEDURE — 49561 ZZHC REPAIR INITIAL INCISIONAL HERNIA; INCARCERATED OR STRANGULATED: CPT | Performed by: SURGERY

## 2019-06-25 PROCEDURE — 27210794 ZZH OR GENERAL SUPPLY STERILE: Performed by: SURGERY

## 2019-06-25 PROCEDURE — 49561 ZZHC REPAIR INITIAL INCISIONAL HERNIA; INCARCERATED OR STRANGULATED: CPT | Mod: AS | Performed by: PHYSICIAN ASSISTANT

## 2019-06-25 PROCEDURE — 25000128 H RX IP 250 OP 636: Performed by: NURSE ANESTHETIST, CERTIFIED REGISTERED

## 2019-06-25 PROCEDURE — 88302 TISSUE EXAM BY PATHOLOGIST: CPT | Performed by: SURGERY

## 2019-06-25 PROCEDURE — 37000008 ZZH ANESTHESIA TECHNICAL FEE, 1ST 30 MIN: Performed by: SURGERY

## 2019-06-25 PROCEDURE — 25000132 ZZH RX MED GY IP 250 OP 250 PS 637: Performed by: NURSE ANESTHETIST, CERTIFIED REGISTERED

## 2019-06-25 PROCEDURE — 36000058 ZZH SURGERY LEVEL 3 EA 15 ADDTL MIN: Performed by: SURGERY

## 2019-06-25 PROCEDURE — 49568 ZZHC REPAIR HERNIA WITH MESH: CPT | Performed by: SURGERY

## 2019-06-25 PROCEDURE — 71000012 ZZH RECOVERY PHASE 1 LEVEL 1 FIRST HR: Performed by: SURGERY

## 2019-06-25 PROCEDURE — 25800030 ZZH RX IP 258 OP 636: Performed by: NURSE ANESTHETIST, CERTIFIED REGISTERED

## 2019-06-25 PROCEDURE — 88302 TISSUE EXAM BY PATHOLOGIST: CPT | Mod: 26 | Performed by: SURGERY

## 2019-06-25 PROCEDURE — 40000305 ZZH STATISTIC PRE PROC ASSESS I: Performed by: SURGERY

## 2019-06-25 PROCEDURE — 49568 ZZHC REPAIR HERNIA WITH MESH: CPT | Mod: AS | Performed by: PHYSICIAN ASSISTANT

## 2019-06-25 PROCEDURE — 36000056 ZZH SURGERY LEVEL 3 1ST 30 MIN: Performed by: SURGERY

## 2019-06-25 PROCEDURE — 25000128 H RX IP 250 OP 636: Performed by: SURGERY

## 2019-06-25 PROCEDURE — 71000027 ZZH RECOVERY PHASE 2 EACH 15 MINS: Performed by: SURGERY

## 2019-06-25 PROCEDURE — 37000009 ZZH ANESTHESIA TECHNICAL FEE, EACH ADDTL 15 MIN: Performed by: SURGERY

## 2019-06-25 PROCEDURE — C1781 MESH (IMPLANTABLE): HCPCS | Performed by: SURGERY

## 2019-06-25 PROCEDURE — 25000566 ZZH SEVOFLURANE, EA 15 MIN: Performed by: SURGERY

## 2019-06-25 DEVICE — MESH SYMBOTEX COMPOSITE STEX ROUND 12CM SYM12: Type: IMPLANTABLE DEVICE | Site: ABDOMEN | Status: FUNCTIONAL

## 2019-06-25 RX ORDER — FENTANYL CITRATE 50 UG/ML
25-50 INJECTION, SOLUTION INTRAMUSCULAR; INTRAVENOUS
Status: DISCONTINUED | OUTPATIENT
Start: 2019-06-25 | End: 2019-06-25 | Stop reason: HOSPADM

## 2019-06-25 RX ORDER — FENTANYL CITRATE 50 UG/ML
INJECTION, SOLUTION INTRAMUSCULAR; INTRAVENOUS PRN
Status: DISCONTINUED | OUTPATIENT
Start: 2019-06-25 | End: 2019-06-25

## 2019-06-25 RX ORDER — CEFAZOLIN SODIUM IN 0.9 % NACL 3 G/100 ML
3 INTRAVENOUS SOLUTION, PIGGYBACK (ML) INTRAVENOUS
Status: COMPLETED | OUTPATIENT
Start: 2019-06-25 | End: 2019-06-25

## 2019-06-25 RX ORDER — PROPOFOL 10 MG/ML
INJECTION, EMULSION INTRAVENOUS PRN
Status: DISCONTINUED | OUTPATIENT
Start: 2019-06-25 | End: 2019-06-25

## 2019-06-25 RX ORDER — ONDANSETRON 2 MG/ML
4 INJECTION INTRAMUSCULAR; INTRAVENOUS EVERY 30 MIN PRN
Status: DISCONTINUED | OUTPATIENT
Start: 2019-06-25 | End: 2019-06-25 | Stop reason: HOSPADM

## 2019-06-25 RX ORDER — GABAPENTIN 300 MG/1
300 CAPSULE ORAL ONCE
Status: COMPLETED | OUTPATIENT
Start: 2019-06-25 | End: 2019-06-25

## 2019-06-25 RX ORDER — MEPERIDINE HYDROCHLORIDE 25 MG/ML
12.5 INJECTION INTRAMUSCULAR; INTRAVENOUS; SUBCUTANEOUS
Status: DISCONTINUED | OUTPATIENT
Start: 2019-06-25 | End: 2019-06-25 | Stop reason: HOSPADM

## 2019-06-25 RX ORDER — CEFAZOLIN SODIUM 1 G/50ML
1 INJECTION, SOLUTION INTRAVENOUS SEE ADMIN INSTRUCTIONS
Status: DISCONTINUED | OUTPATIENT
Start: 2019-06-25 | End: 2019-06-25 | Stop reason: HOSPADM

## 2019-06-25 RX ORDER — ONDANSETRON 4 MG/1
4 TABLET, ORALLY DISINTEGRATING ORAL EVERY 30 MIN PRN
Status: DISCONTINUED | OUTPATIENT
Start: 2019-06-25 | End: 2019-06-25 | Stop reason: HOSPADM

## 2019-06-25 RX ORDER — CELECOXIB 200 MG/1
200 CAPSULE ORAL ONCE
Status: COMPLETED | OUTPATIENT
Start: 2019-06-25 | End: 2019-06-25

## 2019-06-25 RX ORDER — SODIUM CHLORIDE, SODIUM LACTATE, POTASSIUM CHLORIDE, CALCIUM CHLORIDE 600; 310; 30; 20 MG/100ML; MG/100ML; MG/100ML; MG/100ML
INJECTION, SOLUTION INTRAVENOUS CONTINUOUS
Status: DISCONTINUED | OUTPATIENT
Start: 2019-06-25 | End: 2019-06-25 | Stop reason: HOSPADM

## 2019-06-25 RX ORDER — NALOXONE HYDROCHLORIDE 0.4 MG/ML
.1-.4 INJECTION, SOLUTION INTRAMUSCULAR; INTRAVENOUS; SUBCUTANEOUS
Status: DISCONTINUED | OUTPATIENT
Start: 2019-06-25 | End: 2019-06-25 | Stop reason: HOSPADM

## 2019-06-25 RX ORDER — OXYCODONE AND ACETAMINOPHEN 5; 325 MG/1; MG/1
1-2 TABLET ORAL EVERY 4 HOURS PRN
Status: DISCONTINUED | OUTPATIENT
Start: 2019-06-25 | End: 2019-06-25 | Stop reason: HOSPADM

## 2019-06-25 RX ORDER — BUPIVACAINE HYDROCHLORIDE AND EPINEPHRINE 5; 5 MG/ML; UG/ML
INJECTION, SOLUTION PERINEURAL PRN
Status: DISCONTINUED | OUTPATIENT
Start: 2019-06-25 | End: 2019-06-25 | Stop reason: HOSPADM

## 2019-06-25 RX ORDER — LIDOCAINE 40 MG/G
CREAM TOPICAL
Status: DISCONTINUED | OUTPATIENT
Start: 2019-06-25 | End: 2019-06-25 | Stop reason: HOSPADM

## 2019-06-25 RX ORDER — ALBUTEROL SULFATE 0.83 MG/ML
2.5 SOLUTION RESPIRATORY (INHALATION) EVERY 4 HOURS PRN
Status: DISCONTINUED | OUTPATIENT
Start: 2019-06-25 | End: 2019-06-25 | Stop reason: HOSPADM

## 2019-06-25 RX ORDER — ACETAMINOPHEN 325 MG/1
975 TABLET ORAL ONCE
Status: COMPLETED | OUTPATIENT
Start: 2019-06-25 | End: 2019-06-25

## 2019-06-25 RX ORDER — DEXAMETHASONE SODIUM PHOSPHATE 4 MG/ML
INJECTION, SOLUTION INTRA-ARTICULAR; INTRALESIONAL; INTRAMUSCULAR; INTRAVENOUS; SOFT TISSUE PRN
Status: DISCONTINUED | OUTPATIENT
Start: 2019-06-25 | End: 2019-06-25

## 2019-06-25 RX ORDER — HYDROXYZINE HYDROCHLORIDE 25 MG/1
25 TABLET, FILM COATED ORAL EVERY 6 HOURS PRN
Status: DISCONTINUED | OUTPATIENT
Start: 2019-06-25 | End: 2019-06-25 | Stop reason: HOSPADM

## 2019-06-25 RX ORDER — HYDROMORPHONE HYDROCHLORIDE 1 MG/ML
.3-.5 INJECTION, SOLUTION INTRAMUSCULAR; INTRAVENOUS; SUBCUTANEOUS EVERY 10 MIN PRN
Status: DISCONTINUED | OUTPATIENT
Start: 2019-06-25 | End: 2019-06-25 | Stop reason: HOSPADM

## 2019-06-25 RX ORDER — HYDROXYZINE HYDROCHLORIDE 50 MG/1
50 TABLET, FILM COATED ORAL EVERY 6 HOURS PRN
Status: DISCONTINUED | OUTPATIENT
Start: 2019-06-25 | End: 2019-06-25 | Stop reason: HOSPADM

## 2019-06-25 RX ORDER — ONDANSETRON 2 MG/ML
INJECTION INTRAMUSCULAR; INTRAVENOUS PRN
Status: DISCONTINUED | OUTPATIENT
Start: 2019-06-25 | End: 2019-06-25

## 2019-06-25 RX ORDER — KETOROLAC TROMETHAMINE 30 MG/ML
30 INJECTION, SOLUTION INTRAMUSCULAR; INTRAVENOUS EVERY 6 HOURS PRN
Status: DISCONTINUED | OUTPATIENT
Start: 2019-06-25 | End: 2019-06-25 | Stop reason: HOSPADM

## 2019-06-25 RX ORDER — OXYCODONE AND ACETAMINOPHEN 5; 325 MG/1; MG/1
1-2 TABLET ORAL EVERY 6 HOURS PRN
Qty: 30 TABLET | Refills: 0 | Status: SHIPPED | OUTPATIENT
Start: 2019-06-25 | End: 2019-08-23

## 2019-06-25 RX ADMIN — ONDANSETRON 4 MG: 2 INJECTION INTRAMUSCULAR; INTRAVENOUS at 08:03

## 2019-06-25 RX ADMIN — ONDANSETRON 4 MG: 2 INJECTION INTRAMUSCULAR; INTRAVENOUS at 11:04

## 2019-06-25 RX ADMIN — SODIUM CHLORIDE, POTASSIUM CHLORIDE, SODIUM LACTATE AND CALCIUM CHLORIDE: 600; 310; 30; 20 INJECTION, SOLUTION INTRAVENOUS at 07:03

## 2019-06-25 RX ADMIN — FENTANYL CITRATE 150 MCG: 50 INJECTION, SOLUTION INTRAMUSCULAR; INTRAVENOUS at 07:41

## 2019-06-25 RX ADMIN — HYDROXYZINE HYDROCHLORIDE 50 MG: 50 TABLET, FILM COATED ORAL at 09:30

## 2019-06-25 RX ADMIN — ROCURONIUM BROMIDE 30 MG: 10 INJECTION INTRAVENOUS at 07:35

## 2019-06-25 RX ADMIN — FENTANYL CITRATE 50 MCG: 50 INJECTION, SOLUTION INTRAMUSCULAR; INTRAVENOUS at 09:19

## 2019-06-25 RX ADMIN — MIDAZOLAM 2 MG: 1 INJECTION INTRAMUSCULAR; INTRAVENOUS at 07:32

## 2019-06-25 RX ADMIN — LIDOCAINE HYDROCHLORIDE 50 MG: 10 INJECTION, SOLUTION EPIDURAL; INFILTRATION; INTRACAUDAL; PERINEURAL at 07:35

## 2019-06-25 RX ADMIN — GABAPENTIN 300 MG: 300 CAPSULE ORAL at 07:03

## 2019-06-25 RX ADMIN — HYDROMORPHONE HYDROCHLORIDE 0.5 MG: 1 INJECTION, SOLUTION INTRAMUSCULAR; INTRAVENOUS; SUBCUTANEOUS at 09:41

## 2019-06-25 RX ADMIN — SODIUM CHLORIDE, POTASSIUM CHLORIDE, SODIUM LACTATE AND CALCIUM CHLORIDE: 600; 310; 30; 20 INJECTION, SOLUTION INTRAVENOUS at 08:43

## 2019-06-25 RX ADMIN — FENTANYL CITRATE 100 MCG: 50 INJECTION, SOLUTION INTRAMUSCULAR; INTRAVENOUS at 07:32

## 2019-06-25 RX ADMIN — ACETAMINOPHEN 975 MG: 325 TABLET, FILM COATED ORAL at 07:02

## 2019-06-25 RX ADMIN — Medication 3 G: at 07:28

## 2019-06-25 RX ADMIN — FENTANYL CITRATE 50 MCG: 50 INJECTION, SOLUTION INTRAMUSCULAR; INTRAVENOUS at 09:31

## 2019-06-25 RX ADMIN — DEXAMETHASONE SODIUM PHOSPHATE 4 MG: 4 INJECTION, SOLUTION INTRA-ARTICULAR; INTRALESIONAL; INTRAMUSCULAR; INTRAVENOUS; SOFT TISSUE at 08:03

## 2019-06-25 RX ADMIN — HYDROMORPHONE HYDROCHLORIDE 0.5 MG: 1 INJECTION, SOLUTION INTRAMUSCULAR; INTRAVENOUS; SUBCUTANEOUS at 09:51

## 2019-06-25 RX ADMIN — PROPOFOL 200 MG: 10 INJECTION, EMULSION INTRAVENOUS at 07:35

## 2019-06-25 RX ADMIN — CELECOXIB 200 MG: 200 CAPSULE ORAL at 07:03

## 2019-06-25 RX ADMIN — LIDOCAINE HYDROCHLORIDE 0.1 ML: 10 INJECTION, SOLUTION EPIDURAL; INFILTRATION; INTRACAUDAL; PERINEURAL at 07:03

## 2019-06-25 ASSESSMENT — MIFFLIN-ST. JEOR: SCORE: 2501.31

## 2019-06-25 NOTE — TELEPHONE ENCOUNTER
Pt received a call from his  insurance and the person he spoke to says they are comparing the 2 CT scans, 1 done 8/2018 and 6/2019.  It is believed that the CT scan from 2018 shows evidence of probable hernia and pt says he was having hip pain and that was the reason for the scan at that time.  Pt would like to clarify this with Dr. Renner.   he says is trying to put this surgery on him.    Brooklynn Tapia  [facility] RN

## 2019-06-25 NOTE — OP NOTE
Preop diagnosis: Incarcerated ventral hernia    Postop diagnosis: Same    Procedure: Laparoscopic assisted open ventral hernia repair    Surgeon: Zurdo    Assistant surgeon: Ravinder Kaufman PA-C (needed for expertise and camera operation retraction hemostasis and suctioning)    Anesthesia: General endotracheal Myers CRNA    Procedure: Patient's abdomen was clipped of extraneous hair and cleaned and draped in a sterile manner.  3 g of Ancef were used as perioperative antibiotics.  1/2% Marcaine with epinephrine was used to anesthetize all skin incisions.  Small subumbilical curvilinear incision made and subcutaneous tissues dissected to fascia.  Hernia sac and fascial defect isolated.  The sac was entered and a large amount of necrotic serous fluid was expressed.  The sac was amputated and sent to pathology.  Necrotic omental contents were also amputated.  The hernia defect measured approximately 2 cm.  The size of the hernia defect were cleared of subcutaneous tissue and the defect was closed using 5 interrupted 0 Prolene sutures and a final 0 Prolene was used to run on top of this.  Fascia was then injected with Marcaine.  Attention was turned to the right upper quadrant.  A 20 mm transverse incision was made and subcutaneous tissues dissected to fascia.  The fascia was injected with Marcaine and opened sharply.  A muscle-splitting technique was used to enter the abdominal cavity and 11 mm trocar placed.  Carbon dioxide was insufflated to 15 mmHg.  Under direct vision, right lower quadrant 5 mm trocar was placed as well as a left-sided 5 mm trocar.  The falciform ligament was taken down to make room for the mesh.  There is very little residual preperitoneal fat covering the hernia repair.  A 12 cm round piece of Covidien Symbotex mesh was then used for repair.  It was soaked in saline and inserted into the abdominal cavity.  The mesh was elevated through a suture placed in the middle of the mesh.  Once against the  anterior abdominal wall an absorbable tack device was used to place A. tach every 1 cm along the periphery of the mesh as well as several quilting tacks.  Finally 4-0 Vicryl sutures were used at the 12 3 6 and 9:00 positions as trans-fascial fixation sutures.  When complete, the mesh lay smoothly against the anterior abdominal wall with no gaps around the periphery.  All trochars were removed under direct vision and the air allowed to desufflate.  The fascial defect of the right upper quadrant incision was closed using an 0 Vicryl suture in a figure-of-eight fashion and injected with Marcaine.  All wounds were irrigated with normal saline and the skin closed using 4-0 Vicryl running subcuticular stitches and dressed with Dermabond.  Patient was then placed into an abdominal binder and extubated.    Estimated blood loss: 5 mL's    IV fluid: 1000 mL's

## 2019-06-25 NOTE — ANESTHESIA PREPROCEDURE EVALUATION
Anesthesia Pre-Procedure Evaluation    Patient: Rashaun Camara   MRN: [id number] : 1975          Preoperative Diagnosis: ventral hernia    Procedure(s):  LAPAROSCOPIC ASSISTED OPEN HERNIORRHAPHY VENTRAL    History reviewed. No pertinent past medical history.  History reviewed. No pertinent surgical history.    Anesthesia Evaluation     .             ROS/MED HX    ENT/Pulmonary:       Neurologic:       Cardiovascular:     (+) hypertension----. : . . . :. .       METS/Exercise Tolerance:     Hematologic:         Musculoskeletal:         GI/Hepatic:         Renal/Genitourinary:         Endo:     (+) Obesity, .      Psychiatric:         Infectious Disease:         Malignancy:         Other:                          Physical Exam  Normal systems: cardiovascular, pulmonary and dental    Airway   Mallampati: II  TM distance: >3 FB  Neck ROM: full    Dental     Cardiovascular       Pulmonary             Lab Results   Component Value Date    WBC 7.2 2019    HGB 14.0 2019    HCT 43.1 2019     2019    CRP 9.7 (H) 2019     2019    POTASSIUM 4.3 2019    CHLORIDE 110 (H) 2019    CO2 27 2019    BUN 20 2019    CR 0.93 2019    GLC 94 2019    RADHA 8.6 2019    ALBUMIN 3.8 2019    PROTTOTAL 7.3 2019    ALT 42 2019    AST 21 2019    ALKPHOS 62 2019    BILITOTAL 0.3 2019    LIPASE 204 2019    TSH 3.16 2017       Preop Vitals  BP Readings from Last 3 Encounters:   19 (!) 141/95   19 136/87   19 130/83    Pulse Readings from Last 3 Encounters:   19 54   19 62   19 67      Resp Readings from Last 3 Encounters:   19 16   19 16   19 16    SpO2 Readings from Last 3 Encounters:   19 97%   19 97%   19 98%      Temp Readings from Last 1 Encounters:   19 36.9  C (98.5  F) (Oral)    Ht Readings from Last 1 Encounters:   19  "1.943 m (6' 4.5\")      Wt Readings from Last 1 Encounters:   06/25/19 149.7 kg (330 lb)    Estimated body mass index is 39.65 kg/m  as calculated from the following:    Height as of this encounter: 1.943 m (6' 4.5\").    Weight as of this encounter: 149.7 kg (330 lb).       Anesthesia Plan      History & Physical Review  History and physical reviewed and following examination; no interval change.    ASA Status:  3 .    NPO Status:  > 6 hours    Plan for General and ETT with Intravenous induction. Maintenance will be Balanced.      Additional equipment: Videolaryngoscope      Postoperative Care      Consents  Anesthetic plan, risks, benefits and alternatives discussed with:  Patient..                 Jos Myers CRNA, APRN CRNA  "

## 2019-06-25 NOTE — TELEPHONE ENCOUNTER
Reason for Call:  Other returning call    Detailed comments: Pt had hernia surgery today and is having issues with WC and the diagnosis for the surgery.  Please call to discuss.    Phone Number Patient can be reached at: Home number on file [phone] (home)    Best Time: any    Can we leave a detailed message on this number? YES    Call taken on 6/25/2019 at 4:42 PM by Barbara Proctor

## 2019-06-25 NOTE — ANESTHESIA POSTPROCEDURE EVALUATION
Patient: Rashaun Camara    Procedure(s):  LAPAROSCOPIC ASSISTED OPEN HERNIORRHAPHY VENTRAL    Diagnosis:ventral hernia  Diagnosis Additional Information: No value filed.    Anesthesia Type:  General, ETT    Note:  Anesthesia Post Evaluation    Patient location during evaluation: Bedside  Patient participation: Able to fully participate in evaluation  Level of consciousness: awake and alert  Pain management: adequate  Airway patency: patent  Cardiovascular status: acceptable  Respiratory status: acceptable  Hydration status: acceptable  PONV: none     Anesthetic complications: None          Last vitals:  Vitals:    06/25/19 0908 06/25/19 0915 06/25/19 0930   BP: (!) 159/94 134/81 (!) 137/97   Pulse: 74 64 56   Resp: 11 (!) 0 12   Temp: 36.5  C (97.7  F)     SpO2: 97% 97% 96%         Electronically Signed By: Jos Myers CRNA, APRN CRNA  June 25, 2019  9:44 AM

## 2019-06-25 NOTE — DISCHARGE INSTRUCTIONS
DISCHARGE INSTRUCTIONS     1. You may resume your regular diet when you feel you are ready    2. No heavy lifting (>30lbs)  for 1 month.    3. You will have some discomfort at the incision sites. This is expected. This should improve over the next 2-3 days. Ice and pain medication will help with this pain. Use prescribed pain medication as instructed.     4. Some bruising and mild swelling is normal after surgery. The area below and around the incision(s) may be hard and elevated. This is part of the normal healing process. This will resolve slowly over the next several months.     5. Your wounds are covered with glue. The glue is water tight and so you can shower or bathe immediately following surgery.     6. Use the following medications (in addition to your normal meds) as shown:      Tylenol (acetaminophen) 500 mg every 6 hours as needed for pain.    Do not take more than 1000 mg of Tylenol every 6 hours -OR- 4g in a day    Motrin (ibuprophen) 600 mg every 6 hours as needed for pain. Take with food.     8. Notify Clinic at [phone] if:     Your discomfort is not relieved by your pain medication     You have signs of infection such as temperature above 100.4 degrees orally,  chills, or increasing daily discomfort.     Incision site is becoming more red and/or there is purulent drainage.      9. Follow up with Dr Renner in 1-2 weeks.    10. When taking narcotics it is important to keep your stools soft to avoid constipation and pain with straining. This is best done by drinking and taking a stool softener (Metamucil or milk of magnesia).      11. Most people take the rest of the week off and return to work the following Monday. You may return sooner as pain allows. During your follow-up appointment, the doctor will give you a formal letter for your work with any restrictions detailed.  All disability or other such paperwork will be addressed at that time.                      Same Day Surgery Discharge  Instructions  Special Precautions After Surgery - Adult    1. It is not unusual to feel lightheaded or faint, up to 24 hours after surgery or while taking pain medication.  If you have these symptoms; sit for a few minutes before standing and have someone assist you when getting up.  2. You should rest and relax for the next 24 hours and must have someone stay with you for at least 24 hours after your discharge.  3. DO NOT DRIVE any vehicle or operate mechanical equipment for 24 hours following the end of your surgery.  DO NOT DRIVE while taking narcotic pain medications that have been prescribed by your physician.  If you had a limb operated on, you must be able to use it fully to drive.  4. DO NOT drink alcoholic beverages for 24 hours following surgery or while taking prescription pain medication.  5. Drink clear liquids (apple juice, ginger ale, broth, 7-Up, etc.).  Progress to your regular diet as you feel able.  6. Any questions call your physician and do not make important decisions for 24 hours.       [facility]:  [phone]     Same Day Surgery [phone], Monday thru Friday 6am-9pm.    Break through Bleeding  As instructed per Surgeon or Nurse.    Post Op Infection  Be alert for signs of infection: redness, swelling, heat, drainage of pus, and/or elevated temperature.  Contact your surgeon if these occur.    Nausea   If post op nausea occurs, at first rest your stomach for a few hours by eating nothing solid and sipping only clear liquids.  Call your Surgeon if nausea does not resolve in 24 hours.

## 2019-06-25 NOTE — ANESTHESIA CARE TRANSFER NOTE
Patient: Rashaun Camara    Procedure(s):  LAPAROSCOPIC ASSISTED OPEN HERNIORRHAPHY VENTRAL    Diagnosis: ventral hernia  Diagnosis Additional Information: No value filed.    Anesthesia Type:   General, ETT     Note:  Airway :Nasal Cannula  Patient transferred to:PACU  Handoff Report: Identifed the Patient, Identified the Reponsible Provider, Reviewed the pertinent medical history, Discussed the surgical course, Reviewed Intra-OP anesthesia mangement and issues during anesthesia, Set expectations for post-procedure period and Allowed opportunity for questions and acknowledgement of understanding      Vitals: (Last set prior to Anesthesia Care Transfer)    CRNA VITALS  6/25/2019 0836 - 6/25/2019 0906      6/25/2019             Pulse:  84    SpO2:  97 %    Resp Rate (observed):  6  (Abnormal)                 Electronically Signed By: Jos Myers CRNA, APRN CRNA  June 25, 2019  9:06 AM

## 2019-06-26 ENCOUNTER — TELEPHONE (OUTPATIENT)
Dept: SURGERY | Facility: CLINIC | Age: 44
End: 2019-06-26

## 2019-06-26 NOTE — TELEPHONE ENCOUNTER
"Reason for call:  Patient reporting a symptom    Symptom or request: Pt calling - states he had surgery yesterday.  Having lots of pains \"like things are wanting to move but nothing is happening\".  Has tried Colace, metamucil - apple cider vinegar - not helping.  Cannot get anything to move and the pain is getting worse.  States he told them about this problem when he was here.    Work comp insurance also asking if he is cleared to return to work on Monday or not.      Duration (how long have symptoms been present): since yesterday    Have you been treated for this before? No    Additional comments:     Phone Number patient can be reached at:  Home number on file [phone] (home)    Best Time:  any    Can we leave a detailed message on this number:  YES    Call taken on 6/26/2019 at 12:58 PM by Alana Mitchell      "

## 2019-06-26 NOTE — TELEPHONE ENCOUNTER
CT scan from 2018 does show a small umbilical hernia, however, the CT scan done in June 2019 shows that this has enlarged significantly and contains signs of incarceration and strangulation (fuid buildup).  If it was not for the patient's work, this hernia would not have worsened and become symptomatic.  This was absolutely work-related and needed to be repaired.    Jens Renner MD

## 2019-06-26 NOTE — TELEPHONE ENCOUNTER
Spoke with provider as pt asked if he could go back to work next Monday. Dr. Renner stated he could go back with no heavy lifting. Advised to call if he needed a note for work.  Notified pt and he verbalized understanding.   Mary Beth WAYNE RN BSN PHN  [facility]

## 2019-06-26 NOTE — OR NURSING
Pt frustrated with the fact that he has not had a BM in 2 days has taken stool softeners X 4 tablets with no relief. Pt encourages to be patient and he will have a bm. Discussed using a stimulant like biscadoyl  Tablets to stimulate along with the softeners, and a plan of care for his constipation.

## 2019-06-26 NOTE — TELEPHONE ENCOUNTER
I spoke to Vladimir today. He says that he had surgery yesterday and is concerned because he has not had a bowel movement yet. He says that he has a history of constipation. He is reminded that there is fasting prior to surgery. There is anesthesia and post surgical medicines that can cause the digestive system to slow down . Also the less active you are the more your digestion can slow down. You should try to increase activity as tolerated. Make sure that you are staying hydrated. And avoid fluids that cause dehydration such as caffeine. He can take fiber and also stool softeners or even laxatives as needed. I suggested that he discusses his history of constipation with his primary once he is back on his feet.  There is a pending note to Dr Renner regarding weather this is WC or not. He is asked to let us know if there is no relief. Janae

## 2019-06-27 LAB — COPATH REPORT: NORMAL

## 2019-07-03 ENCOUNTER — OFFICE VISIT (OUTPATIENT)
Dept: SURGERY | Facility: CLINIC | Age: 44
End: 2019-07-03
Payer: COMMERCIAL

## 2019-07-03 VITALS
DIASTOLIC BLOOD PRESSURE: 79 MMHG | BODY MASS INDEX: 37.19 KG/M2 | HEART RATE: 72 BPM | SYSTOLIC BLOOD PRESSURE: 121 MMHG | TEMPERATURE: 97.7 F | HEIGHT: 77 IN | RESPIRATION RATE: 16 BRPM | WEIGHT: 315 LBS

## 2019-07-03 DIAGNOSIS — Z09 POSTOP CHECK: Primary | ICD-10-CM

## 2019-07-03 PROCEDURE — 99024 POSTOP FOLLOW-UP VISIT: CPT | Performed by: SURGERY

## 2019-07-03 ASSESSMENT — MIFFLIN-ST. JEOR: SCORE: 2501.31

## 2019-07-03 ASSESSMENT — PAIN SCALES - GENERAL: PAINLEVEL: MILD PAIN (2)

## 2019-07-03 NOTE — NURSING NOTE
"Chief Complaint   Patient presents with     Surgical Followup     lap herniorrhaphy DOS 6/25/19       Initial /79 (BP Location: Right arm, Patient Position: Chair, Cuff Size: Adult Large)   Pulse 72   Temp 97.7  F (36.5  C) (Tympanic)   Resp 16   Ht 1.943 m (6' 4.5\")   Wt 149.7 kg (330 lb)   BMI 39.65 kg/m   Estimated body mass index is 39.65 kg/m  as calculated from the following:    Height as of this encounter: 1.943 m (6' 4.5\").    Weight as of this encounter: 149.7 kg (330 lb).  Medications and allergies reviewed.    Skye MAS CMA (AAMA)    "

## 2019-07-03 NOTE — PROGRESS NOTES
"No complaints.  Pain controlled with oral pain meds.    /79 (BP Location: Right arm, Patient Position: Chair, Cuff Size: Adult Large)   Pulse 72   Temp 97.7  F (36.5  C) (Tympanic)   Resp 16   Ht 1.943 m (6' 4.5\")   Wt 149.7 kg (330 lb)   BMI 39.65 kg/m      Exam  [id number]  CTAB  RRR  S&NTND+BS, wounds - cdi s erythema  No CCE    A/P s/p lap VHR healing well.  No heavy lifting for 2 weeks.  RTC prn.    Jens Renner MD   "

## 2019-07-03 NOTE — PATIENT INSTRUCTIONS
Per physician instructions.    If you have questions or concerns on any instructions given to you by your provider today or if you need to schedule an appointment, you can reach us at [phone].    Thank you!

## 2019-07-03 NOTE — LETTER
[facility]  [address] [facility] [address]  [phone]          July 3, 2019    RE:  Rashaun Camara                                                                                                                                                       [address] [facility] [address]            To whom it may concern:    Rashaun Camara is under my professional care for recent surgery.  He should be on light duty (<30lbs) until July 22, 2019.  After then, he is OK to return to full duty.      Sincerely,      Jens Renner MD

## 2019-07-03 NOTE — LETTER
"    7/3/2019         RE: Rashaun Camara  [address] [facility] [address]        Dear Colleague,    Thank you for referring your patient, Rashaun Camara, to the [facility]. Please see a copy of my visit note below.    No complaints.  Pain controlled with oral pain meds.    /79 (BP Location: Right arm, Patient Position: Chair, Cuff Size: Adult Large)   Pulse 72   Temp 97.7  F (36.5  C) (Tympanic)   Resp 16   Ht 1.943 m (6' 4.5\")   Wt 149.7 kg (330 lb)   BMI 39.65 kg/m       Exam  [id number]  CTAB  RRR  S&NTND+BS, wounds - cdi s erythema  No CCE    A/P s/p lap VHR healing well.  No heavy lifting for 2 weeks.  RTC prn.    Jens Renner MD     Again, thank you for allowing me to participate in the care of your patient.        Sincerely,        Jens Renner MD    "

## 2019-07-05 ENCOUNTER — HOSPITAL ENCOUNTER (EMERGENCY)
Facility: CLINIC | Age: 44
Discharge: HOME | End: 2019-07-05
Attending: FAMILY MEDICINE | Admitting: FAMILY MEDICINE
Payer: COMMERCIAL

## 2019-07-05 VITALS
BODY MASS INDEX: 38.36 KG/M2 | SYSTOLIC BLOOD PRESSURE: 149 MMHG | HEIGHT: 76 IN | HEART RATE: 75 BPM | TEMPERATURE: 98.9 F | RESPIRATION RATE: 16 BRPM | OXYGEN SATURATION: 100 % | WEIGHT: 315 LBS | DIASTOLIC BLOOD PRESSURE: 83 MMHG

## 2019-07-05 DIAGNOSIS — R10.32 LLQ ABDOMINAL PAIN: ICD-10-CM

## 2019-07-05 LAB
ALBUMIN UR-MCNC: NEGATIVE MG/DL
APPEARANCE UR: CLEAR
BILIRUB UR QL STRIP: NEGATIVE
COLOR UR AUTO: YELLOW
GLUCOSE UR STRIP-MCNC: NEGATIVE MG/DL
HGB UR QL STRIP: ABNORMAL
KETONES UR STRIP-MCNC: NEGATIVE MG/DL
LEUKOCYTE ESTERASE UR QL STRIP: NEGATIVE
MUCOUS THREADS #/AREA URNS LPF: PRESENT /LPF
NITRATE UR QL: NEGATIVE
PH UR STRIP: 5 PH (ref 5–7)
RBC #/AREA URNS AUTO: 0 /HPF (ref 0–2)
SOURCE: ABNORMAL
SP GR UR STRIP: 1.02 (ref 1–1.03)
UROBILINOGEN UR STRIP-MCNC: 0 MG/DL (ref 0–2)
WBC #/AREA URNS AUTO: <1 /HPF (ref 0–5)

## 2019-07-05 PROCEDURE — 99283 EMERGENCY DEPT VISIT LOW MDM: CPT | Performed by: FAMILY MEDICINE

## 2019-07-05 PROCEDURE — 81001 URINALYSIS AUTO W/SCOPE: CPT | Performed by: NURSE PRACTITIONER

## 2019-07-05 PROCEDURE — 99284 EMERGENCY DEPT VISIT MOD MDM: CPT | Mod: Z6 | Performed by: FAMILY MEDICINE

## 2019-07-05 RX ORDER — ONDANSETRON 4 MG/1
4-8 TABLET, ORALLY DISINTEGRATING ORAL EVERY 8 HOURS PRN
Qty: 12 TABLET | Refills: 0 | Status: SHIPPED | OUTPATIENT
Start: 2019-07-05 | End: 2019-09-09

## 2019-07-05 RX ORDER — CIPROFLOXACIN 500 MG/1
500 TABLET, FILM COATED ORAL 2 TIMES DAILY
Qty: 20 TABLET | Refills: 0 | Status: SHIPPED | OUTPATIENT
Start: 2019-07-05 | End: 2019-08-23

## 2019-07-05 RX ORDER — METRONIDAZOLE 500 MG/1
500 TABLET ORAL 2 TIMES DAILY
Qty: 20 TABLET | Refills: 0 | Status: SHIPPED | OUTPATIENT
Start: 2019-07-05 | End: 2019-08-23

## 2019-07-05 ASSESSMENT — ENCOUNTER SYMPTOMS
WHEEZING: 0
FLANK PAIN: 0
CHEST TIGHTNESS: 0
SORE THROAT: 0
DYSURIA: 0
EYE DISCHARGE: 0
FREQUENCY: 0
BLOOD IN STOOL: 0
PALPITATIONS: 0
SHORTNESS OF BREATH: 0
FEVER: 0
HEADACHES: 0
DIARRHEA: 0
RHINORRHEA: 0
LIGHT-HEADEDNESS: 0
NAUSEA: 0
EYE PAIN: 0
COUGH: 0
STRIDOR: 0
NECK PAIN: 0
ABDOMINAL PAIN: 0
CONSTIPATION: 0
HEMATURIA: 0

## 2019-07-05 ASSESSMENT — MIFFLIN-ST. JEOR: SCORE: 2493.37

## 2019-07-05 NOTE — ED PROVIDER NOTES
HPI   The patient is a 43-year-old male presenting with left sided abdominal pain.  The patient has a known history of umbilical herniorrhaphy just 2 weeks ago.  This is healing as expected.  His follow-up appointment was without a change in the usual course.  He also has a known history of diverticulitis which occurred about a year and a half ago.  He was treated with oral medication and there were no complications.    The patient had an episode of left-sided abdominal pain about 3 months ago.  He was treated in the clinic using oral antibiotics for 6 days.  He improved rapidly and then had some subtle recurrence but this resolved on its own and he has been well for months.  Then, starting this week he recognized new left-sided discomfort.  This is constant.  It has slowly worsened over the past few days.  He has had some constipation which has improved with stool softener.  His pain is not improved with stool output.  He has chills at night only.  No objective fever identified.  He denies nausea.  No vomiting.  He denies dysuria, urgency, or frequency.  No hematuria reported.  No scrotal swelling or testicular pain/swelling.  No trauma or injury.  No skin rash.        Allergies:  Allergies   Allergen Reactions     Penicillins Rash     Problem List:    Patient Active Problem List    Diagnosis Date Noted     Obesity (BMI 35.0-39.9) with comorbidity (H) 12/03/2018     Priority: Medium     Benign essential hypertension 09/27/2017     Priority: Medium      Past Medical History:    History reviewed. No pertinent past medical history.  Past Surgical History:    Past Surgical History:   Procedure Laterality Date     LAPAROSCOPIC HERNIORRHAPHY VENTRAL N/A 6/25/2019    Procedure: LAPAROSCOPIC ASSISTED OPEN HERNIORRHAPHY VENTRAL;  Surgeon: Jens Renner MD;  Location: [facility] OR     Family History:    No family history on file.  Social History:  Marital Status:  Single [1]  Social History     Tobacco Use     Smoking status:  "Never Smoker     Smokeless tobacco: Never Used   Substance Use Topics     Alcohol use: No     Drug use: No      Medications:      ciprofloxacin (CIPRO) 500 MG tablet   metroNIDAZOLE (FLAGYL) 500 MG tablet   docusate sodium (COLACE) 100 MG tablet   lisinopril (PRINIVIL/ZESTRIL) 40 MG tablet   ondansetron (ZOFRAN-ODT) 4 MG ODT tab     Review of Systems   Constitutional: Negative for fever.   HENT: Negative for dental problem, ear pain, rhinorrhea and sore throat.    Eyes: Negative for pain and discharge.   Respiratory: Negative for cough, chest tightness, shortness of breath, wheezing and stridor.    Cardiovascular: Negative for chest pain and palpitations.   Gastrointestinal: Negative for abdominal pain, blood in stool, constipation, diarrhea and nausea.   Genitourinary: Negative for dysuria, flank pain, frequency, hematuria and urgency.   Musculoskeletal: Negative for neck pain.   Skin: Negative for rash.   Neurological: Negative for light-headedness and headaches.   All other systems reviewed and are negative.      PE   BP: 149/83  Pulse: 75  Temp: 98.9  F (37.2  C)  Resp: 16  Height: 193 cm (6' 4\")  Weight: 149.7 kg (330 lb)  SpO2: 100 %  Physical Exam   Constitutional: He appears distressed.   HENT:   Head: Atraumatic.   Mouth/Throat: Oropharynx is clear and moist.   Eyes: Pupils are equal, round, and reactive to light. EOM are normal. No scleral icterus.   Neck: Normal range of motion.   Cardiovascular: Normal heart sounds and intact distal pulses.   Pulmonary/Chest: Breath sounds normal. No respiratory distress.   Abdominal: Soft. Bowel sounds are normal. He exhibits no distension, no ascites and no mass. There is no hepatosplenomegaly. There is tenderness in the left lower quadrant. There is no rigidity and no guarding.   Musculoskeletal: He exhibits no edema or tenderness.   Skin: Skin is warm. No rash noted. He is not diaphoretic.   Incisions are healing well.  No evidence for cellulitis or dehiscence. "   Nursing note and vitals reviewed.      ED COURSE and MDM   1326.  The patient has left-sided abdominal pain and tenderness.  His symptoms are very reminiscent of his last episode of diverticulitis.  I did suggest getting a CT scan to clarify for a complicating abscess given his relatively recent treatment for diverticulitis a few months ago.  He is refusing.  I also have some concern about his recurring diarrhea which has been ongoing for months.  Inflammatory bowel disease was discussed.  Close follow-up and perhaps a colonoscopy is recommended.  Less concern is present for ureteral stone.  He is without urinary symptoms and nausea.  There has been no personal or family history of ureteral stones diagnosed.  Instead, I will provide ciprofloxacin and Flagyl to be taken twice a day over the next 10 days.  He does need to return here for any worsening, details discussed.  Follow-up was also reviewed.  No prescriptions for narcotic medication given as he has Percocet at home from his recent procedure.    LABS  Labs Ordered and Resulted from Time of ED Arrival Up to the Time of Departure from the ED   URINE MACROSCOPIC WITH REFLEX TO MICRO - Abnormal; Notable for the following components:       Result Value    Blood Urine Small (*)     Mucous Urine Present (*)     All other components within normal limits       IMAGING  Images reviewed by me.  Radiology report also reviewed.  No orders to display       Results for orders placed or performed during the hospital encounter of 07/05/19 (from the past 24 hour(s))   UA reflex to Microscopic   Result Value Ref Range    Color Urine Yellow     Appearance Urine Clear     Glucose Urine Negative NEG^Negative mg/dL    Bilirubin Urine Negative NEG^Negative    Ketones Urine Negative NEG^Negative mg/dL    Specific Gravity Urine 1.019 1.003 - 1.035    Blood Urine Small (A) NEG^Negative    pH Urine 5.0 5.0 - 7.0 pH    Protein Albumin Urine Negative NEG^Negative mg/dL    Urobilinogen  mg/dL 0.0 0.0 - 2.0 mg/dL    Nitrite Urine Negative NEG^Negative    Leukocyte Esterase Urine Negative NEG^Negative    Source Midstream Urine     RBC Urine 0 0 - 2 /HPF    WBC Urine <1 0 - 5 /HPF    Mucous Urine Present (A) NEG^Negative /LPF     Medications - No data to display      IMPRESSION       ICD-10-CM    1. LLQ abdominal pain R10.32             Medication List      Started    ciprofloxacin 500 MG tablet  Commonly known as:  CIPRO  500 mg, Oral, 2 TIMES DAILY     metroNIDAZOLE 500 MG tablet  Commonly known as:  FLAGYL  500 mg, Oral, 2 TIMES DAILY                          Joselito Aleman MD  07/05/19 1322

## 2019-07-05 NOTE — ED AVS SNAPSHOT
[facility] Emergency Department  [address] [address]  Phone:  [phone]  Fax:  [phone]                                    Rashaun Camara   MRN: [id number]    Department:  [facility] Emergency Department   Date of Visit:  7/5/2019           After Visit Summary Signature Page    I have received my discharge instructions, and my questions have been answered. I have discussed any challenges I see with this plan with the nurse or doctor.    ..........................................................................................................................................  Patient/Patient Representative Signature      ..........................................................................................................................................  Patient Representative Print Name and Relationship to Patient    ..................................................               ................................................  Date                                   Time    ..........................................................................................................................................  Reviewed by Signature/Title    ...................................................              ..............................................  Date                                               Time          22EPIC Rev 08/18

## 2019-07-05 NOTE — DISCHARGE INSTRUCTIONS
Return to the Emergency Room if the following occurs:     Severely worsened pain, fever >101, vomiting/dehydration, fainting, or for any concern at anytime.    Or, follow-up with the following provider as we discussed:     Return to your primary doctor as needed - consider follow-up with them to talk about your recurring diarrhea and the potential need for a colonoscopy or GI consultation.    Return to the ER if not improved over the next 5-7 days.    Medications discussed:    Ibuprofen 600 mg every six hours for pain (7 days duration).  Tylenol 1000 mg every six hours for pain (7 days duration).  Therefore, you can alternate these every three hours and do it safely.  Ciprofloxacin.  Flagyl (metronidazole).    If you received pain-relieving or sedating medication during your time in the ER, avoid alcohol, driving automobiles, or working with machinery.  Also, a responsible adult must stay with you.        Call the Nurse Advice Line at [phone] or [phone] for any concern at anytime.

## 2019-07-05 NOTE — ED NOTES
2 weeks post op hernia repair, 3 days of increasing abd pain, L sided. LBM today, denies constipation, no n/v. Pain worse with mvt. Pt reports incisions healing well.

## 2019-07-17 ENCOUNTER — OFFICE VISIT (OUTPATIENT)
Dept: SURGERY | Facility: CLINIC | Age: 44
End: 2019-07-17
Payer: OTHER MISCELLANEOUS

## 2019-07-17 VITALS
HEIGHT: 76 IN | HEART RATE: 60 BPM | BODY MASS INDEX: 38.36 KG/M2 | DIASTOLIC BLOOD PRESSURE: 89 MMHG | SYSTOLIC BLOOD PRESSURE: 148 MMHG | TEMPERATURE: 97.5 F | WEIGHT: 315 LBS

## 2019-07-17 DIAGNOSIS — Z09 POSTOP CHECK: ICD-10-CM

## 2019-07-17 DIAGNOSIS — T81.41XA INFECTION OF SUPERFICIAL INCISIONAL SURGICAL SITE AFTER PROCEDURE, INITIAL ENCOUNTER: Primary | ICD-10-CM

## 2019-07-17 PROCEDURE — 99024 POSTOP FOLLOW-UP VISIT: CPT | Performed by: SURGERY

## 2019-07-17 RX ORDER — CEPHALEXIN 500 MG/1
500 CAPSULE ORAL 4 TIMES DAILY
Qty: 28 CAPSULE | Refills: 0 | Status: SHIPPED | OUTPATIENT
Start: 2019-07-17 | End: 2019-08-23

## 2019-07-17 RX ORDER — METRONIDAZOLE 500 MG/1
500 TABLET ORAL 3 TIMES DAILY
Qty: 42 TABLET | Refills: 0 | Status: SHIPPED | OUTPATIENT
Start: 2019-07-17 | End: 2019-08-23

## 2019-07-17 RX ORDER — CIPROFLOXACIN 500 MG/1
500 TABLET, FILM COATED ORAL 2 TIMES DAILY
Qty: 28 TABLET | Refills: 0 | Status: SHIPPED | OUTPATIENT
Start: 2019-07-17 | End: 2019-09-09

## 2019-07-17 ASSESSMENT — MIFFLIN-ST. JEOR: SCORE: 2493.37

## 2019-07-17 NOTE — LETTER
7/17/2019         RE: Rashaun Camara  [address] [place] [id number]        Dear Colleague,    Thank you for referring your patient, Rashaun Camara, to the [facility]. Please see a copy of my visit note below.    Patient had some purulent drainage from the umbilical wound which is since resolved.  Patient also had a bout of diverticulitis during recovery.    There were no vitals taken for this visit.    Exam  [id number]  CTAB  RRR  S&NTND+BS, wounds -clean and dry.  Small amount of erythema around umbilical wound.  No CCE    A/P s/p lap assisted open ventral hernia repair healing well.  No heavy lifting for 1 weeks.  I wrote a prescription for Keflex to treat the small superficial wound infection.  I also wrote the patient for Cipro and Flagyl so he can self treat diverticulitis if this flares up again.  Advised patient he needs to get a primary care provider to follow him for the diverticular problem.  He maintains a high-fiber diet.    Jens Renner MD       Again, thank you for allowing me to participate in the care of your patient.        Sincerely,        Jens Renner MD

## 2019-07-17 NOTE — NURSING NOTE
"Initial /89 (BP Location: Right arm, Patient Position: Sitting, Cuff Size: Adult Large)   Pulse 60   Temp 97.5  F (36.4  C) (Tympanic)   Ht 1.93 m (6' 4\")   Wt 149.7 kg (330 lb)   BMI 40.17 kg/m   Estimated body mass index is 40.17 kg/m  as calculated from the following:    Height as of this encounter: 1.93 m (6' 4\").    Weight as of this encounter: 149.7 kg (330 lb). .    Barbara Patel MA    "

## 2019-07-17 NOTE — PROGRESS NOTES
Patient had some purulent drainage from the umbilical wound which is since resolved.  Patient also had a bout of diverticulitis during recovery.    There were no vitals taken for this visit.    Exam  [id number]  CTAB  RRR  S&NTND+BS, wounds -clean and dry.  Small amount of erythema around umbilical wound.  No CCE    A/P s/p lap assisted open ventral hernia repair healing well.  No heavy lifting for 1 weeks.  I wrote a prescription for Keflex to treat the small superficial wound infection.  I also wrote the patient for Cipro and Flagyl so he can self treat diverticulitis if this flares up again.  Advised patient he needs to get a primary care provider to follow him for the diverticular problem.  He maintains a high-fiber diet.    Jens Renner MD

## 2019-08-07 ENCOUNTER — TELEPHONE (OUTPATIENT)
Dept: SURGERY | Facility: CLINIC | Age: 44
End: 2019-08-07

## 2019-08-07 NOTE — TELEPHONE ENCOUNTER
Reason for Call:  Form, our goal is to have forms completed with 72 hours, however, some forms may require a visit or additional information.    Type of letter, form or note:  medical    Who is the form from?: Insurance comp    Where did the form come from: form was faxed in    What clinic location was the form placed at?: [facility] (ENT, Neurology, Rheumatology, Surgery, Urology, Vascular Surgery)    Where the form was placed: Given to MA/RN    What number is listed as a contact on the form?: [phone]       Additional comments: please complete the form and fax back to [phone]    Call taken on 8/7/2019 at 9:24 AM by Shara Gómez

## 2019-08-07 NOTE — TELEPHONE ENCOUNTER
Forms completed and placed on providers desk to review and sign when back in clinic.    Kalani PAZ MA

## 2019-08-19 ENCOUNTER — TELEPHONE (OUTPATIENT)
Dept: SURGERY | Facility: CLINIC | Age: 44
End: 2019-08-19

## 2019-08-19 NOTE — TELEPHONE ENCOUNTER
"Umbilical incision red and does have small drainage if \"squeezes it\".  Advised to not try to remove drainage and see provider on Friday.  Appt schedule.    "

## 2019-08-19 NOTE — TELEPHONE ENCOUNTER
Reason for Call:  Other uestions    Detailed comments: Pt states surgery site is still red, little hot, is this normal? Thinks it might still be infected, please call     Phone Number Patient can be reached at: Home number on file [phone] (home)    Best Time: today    Can we leave a detailed message on this number? YES    Call taken on 8/19/2019 at 3:38 PM by Haley Washington

## 2019-08-23 ENCOUNTER — OFFICE VISIT (OUTPATIENT)
Dept: SURGERY | Facility: CLINIC | Age: 44
End: 2019-08-23
Payer: OTHER MISCELLANEOUS

## 2019-08-23 VITALS
TEMPERATURE: 97.6 F | WEIGHT: 315 LBS | SYSTOLIC BLOOD PRESSURE: 130 MMHG | DIASTOLIC BLOOD PRESSURE: 78 MMHG | HEIGHT: 76 IN | BODY MASS INDEX: 38.36 KG/M2 | RESPIRATION RATE: 18 BRPM | HEART RATE: 55 BPM

## 2019-08-23 DIAGNOSIS — Z09 POSTOP CHECK: Primary | ICD-10-CM

## 2019-08-23 PROCEDURE — 99024 POSTOP FOLLOW-UP VISIT: CPT | Performed by: SURGERY

## 2019-08-23 ASSESSMENT — MIFFLIN-ST. JEOR: SCORE: 2488.37

## 2019-08-23 NOTE — NURSING NOTE
"Chief Complaint   Patient presents with     RECHECK     incision not healing right - lap herniorrhaphy done 6/25/2019       Initial /78 (BP Location: Right arm, Patient Position: Chair, Cuff Size: Adult Large)   Pulse 55   Temp 97.6  F (36.4  C) (Tympanic)   Resp 18   Ht 1.93 m (6' 4\")   Wt 149.7 kg (330 lb)   BMI 40.17 kg/m   Estimated body mass index is 40.17 kg/m  as calculated from the following:    Height as of this encounter: 1.93 m (6' 4\").    Weight as of this encounter: 149.7 kg (330 lb).  Medications and allergies reviewed.    Skye MAS CMA (AAMA)    "

## 2019-08-23 NOTE — PATIENT INSTRUCTIONS
Per physician instructions.    If you have questions or concerns on any instructions given to you by your provider today or if you need to schedule an appointment, you can reach us at [phone].    Thank you!

## 2019-08-23 NOTE — PROGRESS NOTES
44-year-old male is now 7 weeks out from a laparoscopic assisted open ventral hernia repair.  His subumbilical incision continues to drain occasionally although he reports that this is significantly better.  It is nontender.  It seems to drain clear to cloudy fluid every other week or so.  He otherwise is without symptoms.    Exam:    Subumbilical incision with erythema and nontender.  Several small areas along the incision line show signs of recent drainage.      Assessment and plan: 44-year-old male with superficial skin infection following surgery.  This has likely persisted because of the Vicryl suture under the skin.  Vicryl takes about 2-1/2 months to completely dissolve and I suspect when this happens the infection will clear itself.  Advised patient to keep an eye on it but if it persists after a couple to 3 more months, please return to clinic and we can do excision under local anesthesia and allow this to heal by secondary intention.  He will return to clinic as necessary.    Jens Renner MD

## 2019-08-23 NOTE — LETTER
8/23/2019         RE: Rashaun Camara  [address] [place] [id number]        Dear Colleague,    Thank you for referring your patient, Rashaun Camara, to the [facility]. Please see a copy of my visit note below.    44-year-old male is now 7 weeks out from a laparoscopic assisted open ventral hernia repair.  His subumbilical incision continues to drain occasionally although he reports that this is significantly better.  It is nontender.  It seems to drain clear to cloudy fluid every other week or so.  He otherwise is without symptoms.    Exam:    Subumbilical incision with erythema and nontender.  Several small areas along the incision line show signs of recent drainage.      Assessment and plan: 44-year-old male with superficial skin infection following surgery.  This has likely persisted because of the Vicryl suture under the skin.  Vicryl takes about 2-1/2 months to completely dissolve and I suspect when this happens the infection will clear itself.  Advised patient to keep an eye on it but if it persists after a couple to 3 more months, please return to clinic and we can do excision under local anesthesia and allow this to heal by secondary intention.  He will return to clinic as necessary.    Jens Renner MD     Again, thank you for allowing me to participate in the care of your patient.        Sincerely,        Jens Renner MD

## 2019-09-09 ENCOUNTER — OFFICE VISIT (OUTPATIENT)
Dept: FAMILY MEDICINE | Facility: CLINIC | Age: 44
End: 2019-09-09
Payer: COMMERCIAL

## 2019-09-09 ENCOUNTER — ANCILLARY PROCEDURE (OUTPATIENT)
Dept: GENERAL RADIOLOGY | Facility: CLINIC | Age: 44
End: 2019-09-09
Attending: FAMILY MEDICINE
Payer: COMMERCIAL

## 2019-09-09 VITALS
HEIGHT: 76 IN | DIASTOLIC BLOOD PRESSURE: 86 MMHG | OXYGEN SATURATION: 98 % | TEMPERATURE: 97.5 F | SYSTOLIC BLOOD PRESSURE: 134 MMHG | WEIGHT: 315 LBS | HEART RATE: 60 BPM | BODY MASS INDEX: 38.36 KG/M2 | RESPIRATION RATE: 16 BRPM

## 2019-09-09 DIAGNOSIS — J02.0 STREPTOCOCCAL SORE THROAT: ICD-10-CM

## 2019-09-09 DIAGNOSIS — R05.9 COUGH: Primary | ICD-10-CM

## 2019-09-09 DIAGNOSIS — R05.9 COUGH: ICD-10-CM

## 2019-09-09 LAB
DEPRECATED S PYO AG THROAT QL EIA: NORMAL
SPECIMEN SOURCE: NORMAL

## 2019-09-09 PROCEDURE — 71046 X-RAY EXAM CHEST 2 VIEWS: CPT

## 2019-09-09 PROCEDURE — 87880 STREP A ASSAY W/OPTIC: CPT | Performed by: FAMILY MEDICINE

## 2019-09-09 PROCEDURE — 99213 OFFICE O/P EST LOW 20 MIN: CPT | Performed by: FAMILY MEDICINE

## 2019-09-09 PROCEDURE — 87081 CULTURE SCREEN ONLY: CPT | Performed by: FAMILY MEDICINE

## 2019-09-09 ASSESSMENT — MIFFLIN-ST. JEOR: SCORE: 2515.59

## 2019-09-09 NOTE — PROGRESS NOTES
"Subjective     Rashaun Camara is a 44 year old male who presents to clinic today for the following health issues:    HPI   ENT Symptoms             Symptoms: cc Present Absent Comment   Fever/Chills  x  Fever and chills.   Fatigue  x     Muscle Aches  x     Eye Irritation   x    Sneezing   x    Nasal Rito/Drg  x     Sinus Pressure/Pain  x  At times.   Loss of smell  x     Dental pain   x    Sore Throat   x    Swollen Glands   x    Ear Pain/Fullness   x    Cough x x  Coughing out phlegm in the morning-green-grey in color.  When has tries to go to sleep at night he will have a tickle in the throat that will keep him up.   Wheeze   x    Chest Pain   x    Shortness of breath  x     Rash   x    Other  x  Fog type feeling, reactions are not what they should be and he is .     Symptom duration:  5 days.   Symptom severity:  Getting worse.  He was feeling well on Friday night.  Saturday and Sunday worse again.   Treatments tried:  Nyquil-prn and Dayquil-took once.  Sudafed-didn't help.   Contacts: Wife and son have been ill.     He is not wheezing. He has used Albuterol in the past.     Current Outpatient Medications:      lisinopril (PRINIVIL/ZESTRIL) 40 MG tablet, TAKE ONE TABLET BY MOUTH EVERY DAY, Disp: 90 tablet, Rfl: 1    Patient Active Problem List   Diagnosis     Benign essential hypertension     Obesity (BMI 35.0-39.9) with comorbidity (H)       Blood pressure 134/86, pulse 60, temperature 97.5  F (36.4  C), temperature source Tympanic, resp. rate 16, height 1.93 m (6' 4\"), weight (!) 152.4 kg (336 lb), SpO2 98 %.    Exam:  GENERAL APPEARANCE: alert, moderate distress, cooperative and fatigued  EYES: EOMI,  PERRL  HENT: ear canals and TM's normal and tonsillar erythema  NECK: no adenopathy, no asymmetry, masses, or scars and thyroid normal to palpation  RESP: lungs clear to auscultation - no rales, rhonchi or wheezes  CV: regular rates and rhythm, normal S1 S2, no S3 or S4 and no murmur, click or rub -    PFM " is 600/640.     CXR: negative for acute findings  RST: negative and the 24 hour test is pending.       (R05) Cough  (primary encounter diagnosis)  Comment:   Plan: XR Chest 2 Views, Beta strep group A culture        We reviewed the CXR and this is negative for acute changes. Symptomatic therapies discussed.   Use the DM cough med. Elevate the head of the bed. Stay well hydrated. Tylenol and advil as needed for the fever and the muscle aches.   Avoid contagious exposures. Gradually increase the activities and see the work letter. Follow up as needed.     (J02.0) Streptococcal sore throat  Comment:   Plan: Strep, Rapid Screen        The rapid test is negative and the 24 hour test will be called. See above.     \Hansel Orozco MD

## 2019-09-09 NOTE — LETTER
[facility]  [address] [facility] [address]  Phone: [phone]    September 9, 2019      Rashaun Jax  [address] [place] [id number]      Dear Mr. Duvallmts    For medical reasons you should  Be excused from 9-8-19 to 9-10-19.   Return on 9-11-19.     Sincerely,    / Hansel Orozco MD

## 2019-09-10 LAB
BACTERIA SPEC CULT: NORMAL
SPECIMEN SOURCE: NORMAL

## 2019-09-27 ENCOUNTER — TELEPHONE (OUTPATIENT)
Dept: FAMILY MEDICINE | Facility: CLINIC | Age: 44
End: 2019-09-27

## 2019-09-27 NOTE — TELEPHONE ENCOUNTER
Forms for work comp received started to fill out. Routed to [place] placed in mail box to be brought to office.  Evelyne Dalton on 9/27/2019 at 4:01 PM

## 2019-10-02 ENCOUNTER — TELEPHONE (OUTPATIENT)
Dept: SURGERY | Facility: CLINIC | Age: 44
End: 2019-10-02

## 2019-10-02 NOTE — TELEPHONE ENCOUNTER
Reason for Call:  Form, our goal is to have forms completed with 72 hours, however, some forms may require a visit or additional information.    Type of letter, form or note:  medical    Who is the form from?: Insurance comp    Where did the form come from: form was faxed in    What clinic location was the form placed at?: [facility] (ENT, Neurology, Rheumatology, Surgery, Urology, Vascular Surgery)    Where the form was placed: Given to MA/RN    What number is listed as a contact on the form?:        Additional comments: please complete the form and fax back to [phone]    Call taken on 10/2/2019 at 8:06 AM by Shara Gómez

## 2019-11-01 ENCOUNTER — OFFICE VISIT (OUTPATIENT)
Dept: FAMILY MEDICINE | Facility: CLINIC | Age: 44
End: 2019-11-01
Payer: COMMERCIAL

## 2019-11-01 VITALS
TEMPERATURE: 98.3 F | BODY MASS INDEX: 40.41 KG/M2 | SYSTOLIC BLOOD PRESSURE: 130 MMHG | RESPIRATION RATE: 12 BRPM | WEIGHT: 315 LBS | DIASTOLIC BLOOD PRESSURE: 82 MMHG | HEART RATE: 65 BPM | OXYGEN SATURATION: 94 %

## 2019-11-01 DIAGNOSIS — J20.9 ACUTE BRONCHITIS, UNSPECIFIED ORGANISM: Primary | ICD-10-CM

## 2019-11-01 DIAGNOSIS — E66.01 MORBID OBESITY (H): ICD-10-CM

## 2019-11-01 DIAGNOSIS — R50.9 FEVER, UNSPECIFIED FEVER CAUSE: ICD-10-CM

## 2019-11-01 LAB
DEPRECATED S PYO AG THROAT QL EIA: NORMAL
FLUAV+FLUBV AG SPEC QL: NEGATIVE
FLUAV+FLUBV AG SPEC QL: NEGATIVE
SPECIMEN SOURCE: NORMAL
SPECIMEN SOURCE: NORMAL

## 2019-11-01 PROCEDURE — 87880 STREP A ASSAY W/OPTIC: CPT | Performed by: INTERNAL MEDICINE

## 2019-11-01 PROCEDURE — 99203 OFFICE O/P NEW LOW 30 MIN: CPT | Performed by: INTERNAL MEDICINE

## 2019-11-01 PROCEDURE — 87081 CULTURE SCREEN ONLY: CPT | Performed by: INTERNAL MEDICINE

## 2019-11-01 PROCEDURE — 87804 INFLUENZA ASSAY W/OPTIC: CPT | Performed by: INTERNAL MEDICINE

## 2019-11-01 RX ORDER — ACETAMINOPHEN 500 MG
1000 TABLET ORAL EVERY 6 HOURS PRN
COMMUNITY

## 2019-11-01 RX ORDER — AZITHROMYCIN 250 MG/1
TABLET, FILM COATED ORAL
Qty: 6 TABLET | Refills: 0 | Status: SHIPPED | OUTPATIENT
Start: 2019-11-01 | End: 2019-11-06

## 2019-11-01 RX ORDER — BENZONATATE 200 MG/1
200 CAPSULE ORAL 3 TIMES DAILY PRN
Qty: 30 CAPSULE | Refills: 0 | Status: SHIPPED | OUTPATIENT
Start: 2019-11-01 | End: 2020-09-23

## 2019-11-01 NOTE — PROGRESS NOTES
Subjective     Rashaun Camara is a 44 year old male who presents to clinic today for the following health issues:    Here with dorian - GF    HPI   Acute Illness   Acute illness concerns: URI  Onset: a week    Fever: YES- 101.5F - yesterday    Chills/Sweats: YES    Headache (location?): YES    Sinus Pressure:YES    Conjunctivitis:  no    Ear Pain: no    Rhinorrhea: YES    Congestion: YES    Sore Throat: no     Cough: YES    Wheeze: YES    Decreased Appetite: no    Nausea: YES    Vomiting: no    Diarrhea:  YES    Dysuria/Freq.: no    Fatigue/Achiness: YES    Sick/Strep Exposure: son was dx with strep 2 to 3 weeks ago     Therapies Tried and outcome: Tylenol (OTC) 1000 mg today    --cough is worsening, wheezing, myalgias, headache.  --cough is occasionally productive, but feels this is more post-nasal drip  --took Tylenol before visit today.  --no pleuritic chest pain.  --non-smoker, no history of smoking  --history of 2nd hand smoke as a child.  History of childhood asthma    CHEST PAIN     Onset: Couple days    Description:   Location:  entire chest  Character: achey  Radiation: na  Duration: couple seconds and goes away and intermittent     Intensity: moderate    Progression of Symptoms:  same and intermittent    Accompanying Signs & Symptoms:  Shortness of breath: no  Sweating: YES- might be related to the URI above  Nausea/vomiting: YES- nausea  Lightheadedness: no  Palpitations: no  Fever/Chills: YES- see above  Cough: YES  Heartburn: YES    History:   Family history of heart disease no  Tobacco use: no     Precipitating factors:   Worse with exertion: doesn't change  Worse with deep breaths :  doesn't change  Related to food: YES - Maybe    Alleviating factors:  na       Therapies Tried and outcome: na    --a lot of belching, needing to burp out air bubbles  --no exertional chest pain.    Current Outpatient Medications   Medication Sig Dispense Refill     acetaminophen (TYLENOL) 500 MG tablet Take 500-1,000 mg by  mouth every 6 hours as needed for mild pain Taking 2 pills       lisinopril (PRINIVIL/ZESTRIL) 40 MG tablet TAKE ONE TABLET BY MOUTH EVERY DAY 90 tablet 1         Reviewed and updated as needed this visit by Provider         Review of Systems   ROS COMP: Constitutional, HEENT, cardiovascular, pulmonary, gi and gu systems are negative, except as otherwise noted.      Objective    /82   Pulse 65   Temp 98.3  F (36.8  C) (Tympanic)   Resp 12   Wt (!) 150.6 kg (332 lb)   SpO2 94%   BMI 40.41 kg/m    Body mass index is 40.41 kg/m .  Physical Exam   GENERAL APPEARANCE: alert, no distress, fatigued and morbidly obese  EYES: Eyes grossly normal to inspection, PERRL and conjunctivae and sclerae normal  HENT: ear canals and TM's normal and nose and mouth without ulcers or lesions.  Mild bilateral tonsillar enlargement with mild erythema, no exudates  NECK: no adenopathy, no asymmetry, masses, or scars and thyroid normal to palpation  RESP: faint wheezing in YOGI, lungs otherwise clear  CV: regular rates and rhythm, normal S1 S2, no S3 or S4 and no murmur, click or rub    Diagnostic Test Results:  Labs reviewed in Epic  No results found for this or any previous visit (from the past 24 hour(s)).        Assessment & Plan     1. Acute bronchitis, unspecified organism - possible pneumonia.  Patient declined xray due to concern for cost.  Antibiotic treatment given the fever higher than I would expect with viral illness.  Viral still possible.  - azithromycin (ZITHROMAX) 250 MG tablet; Take 2 tablets (500 mg) by mouth daily for 1 day, THEN 1 tablet (250 mg) daily for 4 days.  Dispense: 6 tablet; Refill: 0    2. Fever, unspecified fever cause  - Strep, Rapid Screen  - Influenza A/B antigen  - Beta strep group A culture    3. Morbid obesity (H) - contributes to cough.  See PCP for discussion about diet/exercise           Return in about 1 week (around 11/8/2019) for If symptoms don't improve or if they worsen.    Nicole  Jaclyn Barnett, [facility]

## 2019-11-01 NOTE — LETTER
November 4, 2019          [facility]  [address] [facility] [address]      The results of your recent throat culture were negative.  If you have any further questions or concerns please contact the clinic.            Sincerely,        Nicole Barnett DO/jorge

## 2019-11-02 LAB
BACTERIA SPEC CULT: NORMAL
SPECIMEN SOURCE: NORMAL

## 2019-12-16 DIAGNOSIS — I10 BENIGN ESSENTIAL HYPERTENSION: ICD-10-CM

## 2019-12-16 RX ORDER — LISINOPRIL 40 MG/1
40 TABLET ORAL DAILY
Qty: 90 TABLET | Refills: 0 | Status: SHIPPED | OUTPATIENT
Start: 2019-12-16 | End: 2020-03-19

## 2019-12-16 NOTE — TELEPHONE ENCOUNTER
"Rashaun has apt 12/27 with Mica Stevens for BP but will run out of his Lisinopril. Please send in Rx.  Requested Prescriptions   Pending Prescriptions Disp Refills     lisinopril (PRINIVIL/ZESTRIL) 40 MG tablet 90 tablet 1     Sig: Take 1 tablet (40 mg) by mouth daily       ACE Inhibitors (Including Combos) Protocol Passed - 12/16/2019 12:20 PM        Passed - Blood pressure under 140/90 in past 12 months     BP Readings from Last 3 Encounters:   11/01/19 130/82   09/09/19 134/86   08/23/19 130/78                 Passed - Recent (12 mo) or future (30 days) visit within the authorizing provider's specialty     Patient has had an office visit with the authorizing provider or a provider within the authorizing providers department within the previous 12 mos or has a future within next 30 days. See \"Patient Info\" tab in inbasket, or \"Choose Columns\" in Meds & Orders section of the refill encounter.              Passed - Medication is active on med list        Passed - Patient is age 18 or older        Passed - Normal serum creatinine on file in past 12 months     Recent Labs   Lab Test 06/09/19  1758   CR 0.93             Passed - Normal serum potassium on file in past 12 months     Recent Labs   Lab Test 06/09/19  1758   POTASSIUM 4.3                   Last Written Prescription Date:  6/10/19  Last Fill Quantity: 90,  # refills: 1   Last office visit: 11/1/2019 with prescribing provider:  WES Barnett   Future Office Visit:   Next 5 appointments (look out 90 days)    Dec 27, 2019  8:40 AM CST  SHORT with J[facility] [address] [place] [id number]  [phone]         .r  "

## 2020-03-19 DIAGNOSIS — I10 BENIGN ESSENTIAL HYPERTENSION: ICD-10-CM

## 2020-03-19 RX ORDER — LISINOPRIL 40 MG/1
TABLET ORAL
Qty: 90 TABLET | Refills: 0 | Status: SHIPPED | OUTPATIENT
Start: 2020-03-19 | End: 2020-07-15

## 2020-03-19 NOTE — TELEPHONE ENCOUNTER
"Requested Prescriptions   Pending Prescriptions Disp Refills     lisinopril (ZESTRIL) 40 MG tablet [Pharmacy Med Name: LISINOPRIL 40MG TABS] 90 tablet 0     Sig: TAKE ONE TABLET BY MOUTH ONCE DAILY       ACE Inhibitors (Including Combos) Protocol Passed - 3/19/2020  3:11 PM        Passed - Blood pressure under 140/90 in past 12 months     BP Readings from Last 3 Encounters:   11/01/19 130/82   09/09/19 134/86   08/23/19 130/78                 Passed - Recent (12 mo) or future (30 days) visit within the authorizing provider's specialty     Patient has had an office visit with the authorizing provider or a provider within the authorizing providers department within the previous 12 mos or has a future within next 30 days. See \"Patient Info\" tab in inbasket, or \"Choose Columns\" in Meds & Orders section of the refill encounter.              Passed - Medication is active on med list        Passed - Patient is age 18 or older        Passed - Normal serum creatinine on file in past 12 months     Recent Labs   Lab Test 06/09/19  1758   CR 0.93       Ok to refill medication if creatinine is low          Passed - Normal serum potassium on file in past 12 months     Recent Labs   Lab Test 06/09/19  1758   POTASSIUM 4.3              lisinopril (PRINIVIL/ZESTRIL) 40 MG tablet   Last Written Prescription Date:  12/16/2019  Last Fill Quantity: 90 tablet,  # refills: 0   Last office visit: 11/1/2019 with prescribing provider:  WES Barnett   Future Office Visit:      Symone CARRILLO (R) (M)      "

## 2020-06-29 ENCOUNTER — VIRTUAL VISIT (OUTPATIENT)
Dept: FAMILY MEDICINE | Facility: OTHER | Age: 45
End: 2020-06-29

## 2020-06-29 NOTE — PROGRESS NOTES
"Date: 2020 16:02:21  Clinician: Ravinder Stevens  Clinician NPI: [id number]  Patient: Rashaun Camara  Patient : 1975  Patient Address: [address] [place], [place] [address]  Patient Phone: [phone]  Visit Protocol: URI  Patient Summary:  Rashaun is a 44 year old ( : 1975 ) male who initiated a Visit for COVID-19 (Coronavirus) evaluation and screening. When asked the question \"Please sign me up to receive news, health information and promotions from [employer].\", Rashaun responded \"No\".    Rashaun states his symptoms started suddenly 3-4 days ago. After his symptoms started, they improved and then got worse again.   His symptoms consist of wheezing, diarrhea, malaise, a headache, myalgia, facial pain or pressure, and nasal congestion.   Symptom details     Nasal secretions: The color of his mucus is white.    Wheezing: Rashaun has not ever been diagnosed with asthma. Additional wheezing details as reported by the patient (free text): when i breathe out the end whistles a bit       Facial pain or pressure: The facial pain or pressure feels worse when bending over or leaning forward.     Headache: He states the headache is mild (1-3 on a 10 point pain scale).      Rashaun denies having nausea, teeth pain, ageusia, vomiting, rhinitis, ear pain, chills, sore throat, anosmia, fever, and cough. He also denies having recent facial or sinus surgery in the past 60 days, taking antibiotic medication in the past month, and having a sinus infection within the past year. He is not experiencing dyspnea.   Precipitating events  He has not recently been exposed to someone with influenza. Rashaun has been in close contact with the following high risk individuals: children under the age of 5.   Pertinent COVID-19 (Coronavirus) information  In the past 14 days, Rashaun has not worked in a congregate living setting.   He does not work or volunteer as healthcare worker or a  and does not work or volunteer in a " healthcare facility.   Rashaun also has not lived in a congregate living setting in the past 14 days. He does not live with a healthcare worker.   Rashaun has not had a close contact with a laboratory-confirmed COVID-19 patient within 14 days of symptom onset.   Pertinent medical history  Rashaun needs a return to work/school note.   Weight: 300 lbs   Rashaun does not smoke or use smokeless tobacco.   Weight: 300 lbs    MEDICATIONS: lisinopril oral, ALLERGIES: Penicillins  Clinician Response:  Dear Rashaun,   Your symptoms show that you may have coronavirus (COVID-19). This illness can cause fever, cough and trouble breathing. Many people get a mild case and get better on their own. Some people can get very sick.  What should I do?  We would like to test you for this virus.   1. Please call [phone] to schedule your visit. Explain that you were referred by [facility] to have a COVID-19 test. Be ready to share [facility].  The following will serve as your written order for this COVID Test, ordered by me, for the indication of suspected COVID [Z20.828]: The test will be ordered in [facility], our electronic health record, after you are scheduled. It will show as ordered and authorized by Elías Gibson MD.  Order: COVID-19 (Coronavirus) PCR for SYMPTOMATIC testing from [facility].      2. When it's time for your COVID test:  Stay at least 6 feet away from others. (If someone will drive you to your test, stay in the backseat, as far away from the  as you can.)   Cover your mouth and nose with a mask, tissue or washcloth.  Go straight to the testing site. Don't make any stops on the way there or back.      3.Starting now: Stay home and away from others (self-isolate) until:   You've had no fever---and no medicine that reduces fever---for 3 full days (72 hours). And...   Your other symptoms have gotten better. For example, your cough or breathing has improved. And...   At least 10 days have passed since your  "symptoms started.       During this time, don't leave the house except for testing or medical care.   Stay in your own room, even for meals. Use your own bathroom if you can.   Stay away from others in your home. No hugging, kissing or shaking hands. No visitors.  Don't go to work, school or anywhere else.    Clean \"high touch\" surfaces often (doorknobs, counters, handles, etc.). Use a household cleaning spray or wipes. You'll find a full list of  on the EPA website: www.epa.gov/pesticide-registration/list-n-disinfectants-use-against-sars-cov-2.   Cover your mouth and nose with a mask, tissue or washcloth to avoid spreading germs.  Wash your hands and face often. Use soap and water.  Caregivers in these groups are at risk for severe illness due to COVID-19:  o People 65 years and older  o People who live in a nursing home or long-term care facility  o People with chronic disease (lung, heart, cancer, diabetes, kidney, liver, immunologic)  o People who have a weakened immune system, including those who:   Are in cancer treatment  Take medicine that weakens the immune system, such as corticosteroids  Had a bone marrow or organ transplant  Have an immune deficiency  Have poorly controlled HIV or AIDS  Are obese (body mass index of 40 or higher)  Smoke regularly   o Caregivers should wear gloves while washing dishes, handling laundry and cleaning bedrooms and bathrooms.  o Use caution when washing and drying laundry: Don't shake dirty laundry, and use the warmest water setting that you can.  o For more tips, go to www.cdc.gov/coronavirus/2019-ncov/downloads/10Things.pdf.      How can I take care of myself?   Get lots of rest. Drink extra fluids (unless a doctor has told you not to).   Take Tylenol (acetaminophen) for fever or pain. If you have liver or kidney problems, ask your family doctor if it's okay to take Tylenol.   Adults can take either:    650 mg (two 325 mg pills) every 4 to 6 hours, or...   1,000 mg " (two 500 mg pills) every 8 hours as needed.    Note: Don't take more than 3,000 mg in one day. Acetaminophen is found in many medicines (both prescribed and over-the-counter medicines). Read all labels to be sure you don't take too much.   For children, check the Tylenol bottle for the right dose. The dose is based on the child's age or weight.    If you have other health problems (like cancer, heart failure, an organ transplant or severe kidney disease): Call your specialty clinic if you don't feel better in the next 2 days.       Know when to call 911. Emergency warning signs include:    Trouble breathing or shortness of breath Pain or pressure in the chest that doesn't go away Feeling confused like you haven't felt before, or not being able to wake up Bluish-colored lips or face.  Where can I get more information?   [facility] -- About COVID-19: [email or web address]   CDC -- What to Do If You're Sick: www.cdc.gov/coronavirus/2019-ncov/about/steps-when-sick.html   CDC -- Ending Home Isolation: www.cdc.gov/coronavirus/2019-ncov/hcp/disposition-in-home-patients.html   CDC -- Caring for Someone: www.cdc.gov/coronavirus/2019-ncov/if-you-are-sick/care-for-someone.html   [facility] -- Interim Guidance for Hospital Discharge to Home: www.health.[facility].mn.us/diseases/coronavirus/hcp/hospdischarge.pdf   [facility] clinical trials (COVID-19 research studies): clinicalaffairs.[facility]-clinical-trials    Below are the COVID-19 hotlines at the [place] Department of Health ([facility]). Interpreters are available.    For health questions: Call 565-175-2389 or 1-661.318.6305 (7 a.m. to 7 p.m.) For questions about schools and childcare: Call 485-731-9227 or 1-727.855.4036 (7 a.m. to 7 p.m.)    Diagnosis: Acute upper respiratory infection, unspecified  Diagnosis ICD: J06.9

## 2020-06-30 DIAGNOSIS — Z20.822 ENCOUNTER FOR LABORATORY TESTING FOR COVID-19 VIRUS: Primary | ICD-10-CM

## 2020-06-30 LAB
SARS-COV-2 RNA SPEC QL NAA+PROBE: NOT DETECTED
SPECIMEN SOURCE: NORMAL

## 2020-06-30 PROCEDURE — U0003 INFECTIOUS AGENT DETECTION BY NUCLEIC ACID (DNA OR RNA); SEVERE ACUTE RESPIRATORY SYNDROME CORONAVIRUS 2 (SARS-COV-2) (CORONAVIRUS DISEASE [COVID-19]), AMPLIFIED PROBE TECHNIQUE, MAKING USE OF HIGH THROUGHPUT TECHNOLOGIES AS DESCRIBED BY CMS-2020-01-R: HCPCS | Performed by: FAMILY MEDICINE

## 2020-06-30 PROCEDURE — 99207 ZZC NO CHARGE NURSE ONLY: CPT

## 2020-06-30 NOTE — LETTER
July 1, 2020        Rashaun Camara  [address] [place] [id number]    This letter provides a written record that you were tested for COVID-19 on 6/30/20.       Your result was negative. This means that we didn t find the virus that causes COVID-19 in your sample. A test may show negative when you do actually have the virus. This can happen when the virus is in the early stages of infection, before you feel illness symptoms.    If you have symptoms   Stay home and away from others (self-isolate) until you meet ALL of the guidelines below:    You ve had no fever--and no medicine that reduces fever--for 3 full days (72 hours). And      Your other symptoms have gotten better. For example, your cough or breathing has improved. And     At least 10 days have passed since your symptoms started.    During this time:    Stay home. Don t go to work, school or anywhere else.     Stay in your own room, including for meals. Use your own bathroom if you can.    Stay away from others in your home. No hugging, kissing or shaking hands. No visitors.    Clean  high touch  surfaces often (doorknobs, counters, handles, etc.). Use a household cleaning spray or wipes. You can find a full list on the EPA website at www.epa.gov/pesticide-registration/list-n-disinfectants-use-against-sars-cov-2.    Cover your mouth and nose with a mask, tissue or washcloth to avoid spreading germs.    Wash your hands and face often with soap and water.    Going back to work  Check with your employer for any guidelines to follow for going back to work.    Employers: This document serves as formal notice that your employee tested negative for COVID-19, as of the testing date shown above.

## 2020-07-02 ENCOUNTER — NURSE TRIAGE (OUTPATIENT)
Dept: NURSING | Facility: CLINIC | Age: 45
End: 2020-07-02

## 2020-07-02 NOTE — TELEPHONE ENCOUNTER
Triage call:   Went in to get a covid test- negative result  - has draining and congestion   Present for about one week  Just cleaned his sleep apnea machine- notes that since starting this his sinus issues have decreased significantly    Yellow drainage  Mild headache intermittently- behind his sinuses/eyes - mainly in the AM when he wakes up.   Reports that he is currently draining and sneezing- denies HA currently      Denies ear pain or additional symptoms     COVID 19 Nurse Triage Plan/Patient Instructions    Please be aware that novel coronavirus (COVID-19) may be circulating in the community. If you develop symptoms such as fever, cough, or SOB or if you have concerns about the presence of another infection including coronavirus (COVID-19), please contact your health care provider or visit [email or web address].     Disposition/Instructions    Patient to stay at home and follow home care protocol based instructions. Advised on when to follow up and make an appointment.     Thank you for taking steps to prevent the spread of this virus.  o Limit your contact with others.  o Wear a simple mask to cover your cough.  o Wash your hands well and often.    Resources    [facility]: About COVID-19: [email or web address]    CDC: What to Do If You're Sick: www.cdc.gov/coronavirus/2019-ncov/about/steps-when-sick.html    CDC: Ending Home Isolation: www.cdc.gov/coronavirus/2019-ncov/hcp/disposition-in-home-patients.html     CDC: Caring for Someone: www.cdc.gov/coronavirus/2019-ncov/if-you-are-sick/care-for-someone.html     [facility]: Interim Guidance for Hospital Discharge to Home: www.health.[facility].mn.us/diseases/coronavirus/hcp/hospdischarge.pdf    [facility] clinical trials (COVID-19 research studies): clinicalaffairs.[facility].edu/umn-clinical-trials     Below are the COVID-19 hotlines at the [place] Department of Health ([facility]). Interpreters are available.   o For health questions: Call 847-615-3593 or  1-413.898.9196 (7 a.m. to 7 p.m.)  o For questions about schools and childcare: Call 421-561-3926 or 1-677.556.3093 (7 a.m. to 7 p.m.)       Edwina García RN BSN 7/2/2020 10:16 AM    Additional Information    Negative: Severe difficulty breathing (e.g., struggling for each breath, speaks in single words)    Negative: Sounds like a life-threatening emergency to the triager    Negative: [1] Sinus infection AND [2] taking an antibiotic AND [3] symptoms continue    Negative: [1] Difficulty breathing AND [2] not from stuffy nose (e.g., not relieved by cleaning out the nose)    Negative: [1] SEVERE headache AND [2] fever    Negative: [1] Redness or swelling on the cheek, forehead or around the eye AND [2] fever    Negative: Fever > 104 F (40 C)    Negative: Patient sounds very sick or weak to the triager    Negative: [1] SEVERE pain AND [2] not improved 2 hours after pain medicine    Negative: [1] Redness or swelling on the cheek, forehead or around the eye AND [2] no fever    Negative: [1] Fever > 101 F (38.3 C) AND [2] age > 60    Negative: [1] Fever > 100.0 F (37.8 C) AND [2] bedridden (e.g., nursing home patient, CVA, chronic illness, recovering from surgery)    Negative: [1] Fever > 100.0 F (37.8 C) AND [2] diabetes mellitus or weak immune system (e.g., HIV positive, cancer chemo, splenectomy, organ transplant, chronic steroids)    Negative: Fever present > 3 days (72 hours)    Negative: [1] Fever returns after gone for over 24 hours AND [2] symptoms worse or not improved    Negative: [1] Sinus pain (not just congestion) AND [2] fever    Negative: Earache    Negative: [1] Sinus congestion (pressure, fullness) AND [2] present > 10 days    Negative: [1] Nasal discharge AND [2] present > 10 days    Negative: [1] Using nasal washes and pain medicine > 24 hours AND [2] sinus pain (around cheekbone or eye) persists    Negative: Lots of coughing    [1] Sinus congestion as part of a cold AND [2] present < 10  days    Protocols used: SINUS PAIN OR CONGESTION-A-AH

## 2020-07-15 DIAGNOSIS — I10 BENIGN ESSENTIAL HYPERTENSION: ICD-10-CM

## 2020-07-15 RX ORDER — LISINOPRIL 40 MG/1
40 TABLET ORAL DAILY
Qty: 90 TABLET | Refills: 0 | Status: SHIPPED | OUTPATIENT
Start: 2020-07-15 | End: 2020-10-14

## 2020-09-23 ENCOUNTER — HOSPITAL ENCOUNTER (OUTPATIENT)
Facility: CLINIC | Age: 45
Setting detail: OBSERVATION
Discharge: HOME | End: 2020-09-24
Attending: PHYSICIAN ASSISTANT | Admitting: FAMILY MEDICINE
Payer: COMMERCIAL

## 2020-09-23 ENCOUNTER — APPOINTMENT (OUTPATIENT)
Dept: CT IMAGING | Facility: CLINIC | Age: 45
End: 2020-09-23
Attending: PHYSICIAN ASSISTANT
Payer: COMMERCIAL

## 2020-09-23 DIAGNOSIS — K57.92 DIVERTICULITIS: Primary | ICD-10-CM

## 2020-09-23 PROBLEM — I10 BENIGN ESSENTIAL HYPERTENSION: Status: ACTIVE | Noted: 2017-09-27

## 2020-09-23 PROBLEM — E66.01 MORBID OBESITY (H): Status: ACTIVE | Noted: 2018-12-03

## 2020-09-23 PROBLEM — K57.32 DIVERTICULITIS OF COLON: Status: ACTIVE | Noted: 2020-09-23

## 2020-09-23 LAB
ALBUMIN SERPL-MCNC: 3.7 G/DL (ref 3.4–5)
ALBUMIN UR-MCNC: NEGATIVE MG/DL
ALP SERPL-CCNC: 62 U/L (ref 40–150)
ALT SERPL W P-5'-P-CCNC: 35 U/L (ref 0–70)
ANION GAP SERPL CALCULATED.3IONS-SCNC: 5 MMOL/L (ref 3–14)
APPEARANCE UR: CLEAR
AST SERPL W P-5'-P-CCNC: 17 U/L (ref 0–45)
BASOPHILS # BLD AUTO: 0 10E9/L (ref 0–0.2)
BASOPHILS NFR BLD AUTO: 0.2 %
BILIRUB SERPL-MCNC: 0.8 MG/DL (ref 0.2–1.3)
BILIRUB UR QL STRIP: NEGATIVE
BUN SERPL-MCNC: 13 MG/DL (ref 7–30)
CALCIUM SERPL-MCNC: 9 MG/DL (ref 8.5–10.1)
CHLORIDE SERPL-SCNC: 106 MMOL/L (ref 94–109)
CO2 SERPL-SCNC: 26 MMOL/L (ref 20–32)
COLOR UR AUTO: YELLOW
CREAT SERPL-MCNC: 1.02 MG/DL (ref 0.66–1.25)
DIFFERENTIAL METHOD BLD: NORMAL
EOSINOPHIL # BLD AUTO: 0.1 10E9/L (ref 0–0.7)
EOSINOPHIL NFR BLD AUTO: 1 %
ERYTHROCYTE [DISTWIDTH] IN BLOOD BY AUTOMATED COUNT: 12.4 % (ref 10–15)
GFR SERPL CREATININE-BSD FRML MDRD: 88 ML/MIN/{1.73_M2}
GLUCOSE SERPL-MCNC: 86 MG/DL (ref 70–99)
GLUCOSE UR STRIP-MCNC: NEGATIVE MG/DL
HCT VFR BLD AUTO: 42.5 % (ref 40–53)
HGB BLD-MCNC: 14.3 G/DL (ref 13.3–17.7)
HGB UR QL STRIP: NEGATIVE
IMM GRANULOCYTES # BLD: 0 10E9/L (ref 0–0.4)
IMM GRANULOCYTES NFR BLD: 0.2 %
KETONES UR STRIP-MCNC: NEGATIVE MG/DL
LEUKOCYTE ESTERASE UR QL STRIP: ABNORMAL
LYMPHOCYTES # BLD AUTO: 1.1 10E9/L (ref 0.8–5.3)
LYMPHOCYTES NFR BLD AUTO: 13.4 %
MCH RBC QN AUTO: 28.9 PG (ref 26.5–33)
MCHC RBC AUTO-ENTMCNC: 33.6 G/DL (ref 31.5–36.5)
MCV RBC AUTO: 86 FL (ref 78–100)
MONOCYTES # BLD AUTO: 0.7 10E9/L (ref 0–1.3)
MONOCYTES NFR BLD AUTO: 8.6 %
MUCOUS THREADS #/AREA URNS LPF: PRESENT /LPF
NEUTROPHILS # BLD AUTO: 6.2 10E9/L (ref 1.6–8.3)
NEUTROPHILS NFR BLD AUTO: 76.6 %
NITRATE UR QL: NEGATIVE
NRBC # BLD AUTO: 0 10*3/UL
NRBC BLD AUTO-RTO: 0 /100
PH UR STRIP: 5 PH (ref 5–7)
PLATELET # BLD AUTO: 163 10E9/L (ref 150–450)
POTASSIUM SERPL-SCNC: 4.1 MMOL/L (ref 3.4–5.3)
PROT SERPL-MCNC: 7.5 G/DL (ref 6.8–8.8)
RBC # BLD AUTO: 4.94 10E12/L (ref 4.4–5.9)
RBC #/AREA URNS AUTO: 1 /HPF (ref 0–2)
SODIUM SERPL-SCNC: 137 MMOL/L (ref 133–144)
SOURCE: ABNORMAL
SP GR UR STRIP: 1.03 (ref 1–1.03)
UROBILINOGEN UR STRIP-MCNC: 0 MG/DL (ref 0–2)
WBC # BLD AUTO: 8.1 10E9/L (ref 4–11)
WBC #/AREA URNS AUTO: <1 /HPF (ref 0–5)

## 2020-09-23 PROCEDURE — G0378 HOSPITAL OBSERVATION PER HR: HCPCS

## 2020-09-23 PROCEDURE — 99285 EMERGENCY DEPT VISIT HI MDM: CPT | Mod: 25 | Performed by: PHYSICIAN ASSISTANT

## 2020-09-23 PROCEDURE — 96365 THER/PROPH/DIAG IV INF INIT: CPT

## 2020-09-23 PROCEDURE — 96361 HYDRATE IV INFUSION ADD-ON: CPT | Performed by: PHYSICIAN ASSISTANT

## 2020-09-23 PROCEDURE — 93005 ELECTROCARDIOGRAM TRACING: CPT | Performed by: PHYSICIAN ASSISTANT

## 2020-09-23 PROCEDURE — 96375 TX/PRO/DX INJ NEW DRUG ADDON: CPT | Performed by: PHYSICIAN ASSISTANT

## 2020-09-23 PROCEDURE — 96365 THER/PROPH/DIAG IV INF INIT: CPT | Mod: 59 | Performed by: PHYSICIAN ASSISTANT

## 2020-09-23 PROCEDURE — 99203 OFFICE O/P NEW LOW 30 MIN: CPT | Performed by: SURGERY

## 2020-09-23 PROCEDURE — 74177 CT ABD & PELVIS W/CONTRAST: CPT

## 2020-09-23 PROCEDURE — 96376 TX/PRO/DX INJ SAME DRUG ADON: CPT | Performed by: PHYSICIAN ASSISTANT

## 2020-09-23 PROCEDURE — 25000132 ZZH RX MED GY IP 250 OP 250 PS 637: Performed by: PHYSICIAN ASSISTANT

## 2020-09-23 PROCEDURE — C9803 HOPD COVID-19 SPEC COLLECT: HCPCS | Performed by: PHYSICIAN ASSISTANT

## 2020-09-23 PROCEDURE — 85025 COMPLETE CBC W/AUTO DIFF WBC: CPT | Performed by: PHYSICIAN ASSISTANT

## 2020-09-23 PROCEDURE — 96376 TX/PRO/DX INJ SAME DRUG ADON: CPT

## 2020-09-23 PROCEDURE — U0003 INFECTIOUS AGENT DETECTION BY NUCLEIC ACID (DNA OR RNA); SEVERE ACUTE RESPIRATORY SYNDROME CORONAVIRUS 2 (SARS-COV-2) (CORONAVIRUS DISEASE [COVID-19]), AMPLIFIED PROBE TECHNIQUE, MAKING USE OF HIGH THROUGHPUT TECHNOLOGIES AS DESCRIBED BY CMS-2020-01-R: HCPCS | Performed by: PHYSICIAN ASSISTANT

## 2020-09-23 PROCEDURE — 99220 ZZC INITIAL OBSERVATION CARE,LEVL III: CPT | Performed by: PHYSICIAN ASSISTANT

## 2020-09-23 PROCEDURE — 25000125 ZZHC RX 250: Performed by: PHYSICIAN ASSISTANT

## 2020-09-23 PROCEDURE — 25000128 H RX IP 250 OP 636: Performed by: PHYSICIAN ASSISTANT

## 2020-09-23 PROCEDURE — 80053 COMPREHEN METABOLIC PANEL: CPT | Performed by: PHYSICIAN ASSISTANT

## 2020-09-23 PROCEDURE — 99285 EMERGENCY DEPT VISIT HI MDM: CPT | Mod: Z6 | Performed by: FAMILY MEDICINE

## 2020-09-23 PROCEDURE — 96368 THER/DIAG CONCURRENT INF: CPT | Performed by: PHYSICIAN ASSISTANT

## 2020-09-23 PROCEDURE — 25800030 ZZH RX IP 258 OP 636: Performed by: PHYSICIAN ASSISTANT

## 2020-09-23 PROCEDURE — 81001 URINALYSIS AUTO W/SCOPE: CPT | Performed by: PHYSICIAN ASSISTANT

## 2020-09-23 RX ORDER — PROCHLORPERAZINE MALEATE 5 MG
10 TABLET ORAL EVERY 6 HOURS PRN
Status: DISCONTINUED | OUTPATIENT
Start: 2020-09-23 | End: 2020-09-24 | Stop reason: HOSPADM

## 2020-09-23 RX ORDER — ONDANSETRON 4 MG/1
4 TABLET, ORALLY DISINTEGRATING ORAL EVERY 6 HOURS PRN
Status: DISCONTINUED | OUTPATIENT
Start: 2020-09-23 | End: 2020-09-23

## 2020-09-23 RX ORDER — NALOXONE HYDROCHLORIDE 0.4 MG/ML
.1-.4 INJECTION, SOLUTION INTRAMUSCULAR; INTRAVENOUS; SUBCUTANEOUS
Status: DISCONTINUED | OUTPATIENT
Start: 2020-09-23 | End: 2020-09-23

## 2020-09-23 RX ORDER — OXYCODONE HYDROCHLORIDE 5 MG/1
5-10 TABLET ORAL
Status: DISCONTINUED | OUTPATIENT
Start: 2020-09-23 | End: 2020-09-24 | Stop reason: HOSPADM

## 2020-09-23 RX ORDER — IOPAMIDOL 755 MG/ML
100 INJECTION, SOLUTION INTRAVASCULAR ONCE
Status: COMPLETED | OUTPATIENT
Start: 2020-09-23 | End: 2020-09-23

## 2020-09-23 RX ORDER — OXYCODONE AND ACETAMINOPHEN 5; 325 MG/1; MG/1
1-2 TABLET ORAL EVERY 4 HOURS PRN
Status: DISCONTINUED | OUTPATIENT
Start: 2020-09-23 | End: 2020-09-23

## 2020-09-23 RX ORDER — ACETAMINOPHEN 325 MG/1
650 TABLET ORAL EVERY 4 HOURS PRN
Status: DISCONTINUED | OUTPATIENT
Start: 2020-09-23 | End: 2020-09-23

## 2020-09-23 RX ORDER — ONDANSETRON 2 MG/ML
4 INJECTION INTRAMUSCULAR; INTRAVENOUS EVERY 6 HOURS PRN
Status: DISCONTINUED | OUTPATIENT
Start: 2020-09-23 | End: 2020-09-23

## 2020-09-23 RX ORDER — CEFTRIAXONE SODIUM 1 G/50ML
1 INJECTION, SOLUTION INTRAVENOUS EVERY 12 HOURS
Status: DISCONTINUED | OUTPATIENT
Start: 2020-09-23 | End: 2020-09-24 | Stop reason: HOSPADM

## 2020-09-23 RX ORDER — LISINOPRIL 40 MG/1
40 TABLET ORAL DAILY
Status: DISCONTINUED | OUTPATIENT
Start: 2020-09-24 | End: 2020-09-24 | Stop reason: HOSPADM

## 2020-09-23 RX ORDER — ONDANSETRON 4 MG/1
4 TABLET, ORALLY DISINTEGRATING ORAL EVERY 6 HOURS PRN
Status: DISCONTINUED | OUTPATIENT
Start: 2020-09-23 | End: 2020-09-24 | Stop reason: HOSPADM

## 2020-09-23 RX ORDER — HYDROMORPHONE HYDROCHLORIDE 1 MG/ML
.3-.5 INJECTION, SOLUTION INTRAMUSCULAR; INTRAVENOUS; SUBCUTANEOUS
Status: DISCONTINUED | OUTPATIENT
Start: 2020-09-23 | End: 2020-09-24 | Stop reason: HOSPADM

## 2020-09-23 RX ORDER — SODIUM CHLORIDE 9 MG/ML
INJECTION, SOLUTION INTRAVENOUS CONTINUOUS
Status: DISCONTINUED | OUTPATIENT
Start: 2020-09-23 | End: 2020-09-24

## 2020-09-23 RX ORDER — NALOXONE HYDROCHLORIDE 0.4 MG/ML
.1-.4 INJECTION, SOLUTION INTRAMUSCULAR; INTRAVENOUS; SUBCUTANEOUS
Status: DISCONTINUED | OUTPATIENT
Start: 2020-09-23 | End: 2020-09-24 | Stop reason: HOSPADM

## 2020-09-23 RX ORDER — METRONIDAZOLE 500 MG/1
500 TABLET ORAL 3 TIMES DAILY
Status: ON HOLD | COMMUNITY
Start: 2020-09-22 | End: 2020-09-24

## 2020-09-23 RX ORDER — ACETAMINOPHEN 650 MG/1
650 SUPPOSITORY RECTAL EVERY 4 HOURS PRN
Status: DISCONTINUED | OUTPATIENT
Start: 2020-09-23 | End: 2020-09-24 | Stop reason: HOSPADM

## 2020-09-23 RX ORDER — ONDANSETRON 2 MG/ML
4 INJECTION INTRAMUSCULAR; INTRAVENOUS EVERY 30 MIN PRN
Status: DISCONTINUED | OUTPATIENT
Start: 2020-09-23 | End: 2020-09-23 | Stop reason: DRUGHIGH

## 2020-09-23 RX ORDER — PROCHLORPERAZINE 25 MG
25 SUPPOSITORY, RECTAL RECTAL EVERY 12 HOURS PRN
Status: DISCONTINUED | OUTPATIENT
Start: 2020-09-23 | End: 2020-09-24 | Stop reason: HOSPADM

## 2020-09-23 RX ORDER — ACETAMINOPHEN 500 MG
1000 TABLET ORAL EVERY 6 HOURS PRN
Status: DISCONTINUED | OUTPATIENT
Start: 2020-09-23 | End: 2020-09-24 | Stop reason: HOSPADM

## 2020-09-23 RX ORDER — HYDROMORPHONE HYDROCHLORIDE 1 MG/ML
0.5 INJECTION, SOLUTION INTRAMUSCULAR; INTRAVENOUS; SUBCUTANEOUS
Status: DISCONTINUED | OUTPATIENT
Start: 2020-09-23 | End: 2020-09-23 | Stop reason: DRUGHIGH

## 2020-09-23 RX ORDER — ONDANSETRON 2 MG/ML
4 INJECTION INTRAMUSCULAR; INTRAVENOUS EVERY 6 HOURS PRN
Status: DISCONTINUED | OUTPATIENT
Start: 2020-09-23 | End: 2020-09-24 | Stop reason: HOSPADM

## 2020-09-23 RX ORDER — CIPROFLOXACIN 500 MG/1
500 TABLET, FILM COATED ORAL EVERY 12 HOURS
Status: ON HOLD | COMMUNITY
Start: 2020-09-22 | End: 2020-09-24

## 2020-09-23 RX ADMIN — ONDANSETRON 4 MG: 2 INJECTION INTRAMUSCULAR; INTRAVENOUS at 13:51

## 2020-09-23 RX ADMIN — SODIUM CHLORIDE 1000 ML: 9 INJECTION, SOLUTION INTRAVENOUS at 13:51

## 2020-09-23 RX ADMIN — IOPAMIDOL 100 ML: 755 INJECTION, SOLUTION INTRAVENOUS at 14:08

## 2020-09-23 RX ADMIN — SODIUM CHLORIDE 74 ML: 9 INJECTION, SOLUTION INTRAVENOUS at 14:08

## 2020-09-23 RX ADMIN — SODIUM CHLORIDE: 9 INJECTION, SOLUTION INTRAVENOUS at 17:59

## 2020-09-23 RX ADMIN — ONDANSETRON 4 MG: 2 INJECTION INTRAMUSCULAR; INTRAVENOUS at 18:07

## 2020-09-23 RX ADMIN — HYDROMORPHONE HYDROCHLORIDE 0.5 MG: 1 INJECTION, SOLUTION INTRAMUSCULAR; INTRAVENOUS; SUBCUTANEOUS at 17:59

## 2020-09-23 RX ADMIN — HYDROMORPHONE HYDROCHLORIDE 0.5 MG: 1 INJECTION, SOLUTION INTRAMUSCULAR; INTRAVENOUS; SUBCUTANEOUS at 15:36

## 2020-09-23 RX ADMIN — METRONIDAZOLE 500 MG: 500 INJECTION, SOLUTION INTRAVENOUS at 16:46

## 2020-09-23 RX ADMIN — OXYCODONE HYDROCHLORIDE 10 MG: 5 TABLET ORAL at 20:20

## 2020-09-23 RX ADMIN — HYDROMORPHONE HYDROCHLORIDE 0.5 MG: 1 INJECTION, SOLUTION INTRAMUSCULAR; INTRAVENOUS; SUBCUTANEOUS at 13:51

## 2020-09-23 RX ADMIN — CEFTRIAXONE SODIUM 1 G: 1 INJECTION, SOLUTION INTRAVENOUS at 16:17

## 2020-09-23 ASSESSMENT — MIFFLIN-ST. JEOR: SCORE: 2571.38

## 2020-09-23 NOTE — LETTER
[facility] SURGICAL  [address] [facility] [address]  [phone]          September 24, 2020    RE:  Rashaun Camara                                                                                                                                                       [address] [facility] [address]            To whom it may concern:    Rashaun Camara was hospitalized under my professional care for a serious medical illness.  He may return to work with the following without restrictions on Monday, September 28, 2020.      Sincerely,          Ravinder Centeno MD

## 2020-09-23 NOTE — ED NOTES
Patient has been on Cipro + Metronidazole since 9/21/2020. Patient was extended on prescriptions from 5 days to 8 days total.    Medication list updated and reconciled.

## 2020-09-23 NOTE — ED PROVIDER NOTES
History     Chief Complaint   Patient presents with     Abdominal Pain     blood in stool today     JOESPH Camara is a 45 year old male who with past medical history significant for diverticulitis presents to the emergency department with concern over left lower quadrant abdominal pain and hematochezia.  Patient reports he initially developed left lower quadrant abdominal pain approximately 7 to 10 days ago which felt consistent with his past history of diverticulitis.  He did have home medications and initiated Cipro and Flagyl per his protocol.  When pain seemed to resolve he stopped taking medications after 3 days.  Symptoms of pain recurred two days ago with increasing left lower quadrant abdominal discomfort  Pain is been gradually worsening since that time becoming more generalized in the pelvic region.  It is exacerbated by movement, deep breathing/coughing.  He is unable to identify any clear alleviating factors. He also complains of chills, myalgias, subjective fever, nausea, tenesmus and became more concerned when he had a bowel movement and noted that the toilet was filled with large amount of bright red blood He denies any objective fever, cough, chest pain, dyspnea, wheezing, vomiting, dysuria, urinary frequency, urgency, hematuria, diarrhea.  He had not taken any OTC medications consistently for pain.    Past surgical history is significant for appendectomy as an adolescent, history of laparoscopic ventral herniorrhaphy with mesh placement.      Allergies:  Allergies   Allergen Reactions     Cephalexin      Penicillins Rash     Problem List:    Patient Active Problem List    Diagnosis Date Noted     Obesity (BMI 35.0-39.9) with comorbidity (H) 12/03/2018     Priority: Medium     Benign essential hypertension 09/27/2017     Priority: Medium      Past Medical History:    No past medical history on file.    Past Surgical History:    Past Surgical History:   Procedure Laterality Date      LAPAROSCOPIC HERNIORRHAPHY VENTRAL N/A 6/25/2019    Procedure: LAPAROSCOPIC ASSISTED OPEN HERNIORRHAPHY VENTRAL;  Surgeon: Jens Renner MD;  Location: [facility] OR     Family History:    No family history on file.    Social History:  Marital Status:  Single [1]  Social History     Tobacco Use     Smoking status: Never Smoker     Smokeless tobacco: Never Used   Substance Use Topics     Alcohol use: Yes     Comment: 1 drink per week-occ.     Drug use: No      Medications:    acetaminophen (TYLENOL) 500 MG tablet  benzonatate (TESSALON) 200 MG capsule  lisinopril (ZESTRIL) 40 MG tablet      Review of Systems   Constitutional: Negative for chills and fever (subjective).   HENT: Positive for congestion. Negative for ear pain and sore throat.    Eyes: Negative for pain, discharge, redness, itching and visual disturbance.   Respiratory: Negative for cough, choking, shortness of breath and wheezing.    Cardiovascular: Negative for chest pain, palpitations and leg swelling.   Gastrointestinal: Positive for abdominal pain, blood in stool and nausea. Negative for diarrhea, rectal pain and vomiting.   Genitourinary: Negative for dysuria, flank pain, frequency, hematuria and urgency.   Musculoskeletal: Positive for myalgias.   Skin: Negative for color change, rash and wound.   Neurological: Negative for dizziness, weakness, light-headedness and numbness.     Physical Exam   BP: 122/82  Pulse: 72  Temp: 98  F (36.7  C)  Resp: 18  Weight: 145.2 kg (320 lb)  SpO2: 97 %  Physical Exam  GENERAL APPEARANCE: Alert, cooperative.  Intermittently in distress due to pain  EYES: EOMI,  PERRL, conjunctiva clear  NECK: supple, nontender, no lymphadenopathy  RESP: lungs clear to auscultation - no rales, rhonchi or wheezes  CV: regular rates and rhythm, normal S1 S2, no murmur noted  ABDOMEN:  soft, upper quadrant abdominal pain with localized guarding, no rebound tenderness ,no HSM or masses and bowel sounds normal  NEURO: Normal strength and tone,  sensory exam grossly normal,  normal speech and mentation  SKIN: no suspicious lesions or rashes  ED Course        Procedures               Critical Care time:  none               Results for orders placed or performed during the hospital encounter of 09/23/20   CT Abdomen Pelvis w Contrast     Status: None    Narrative    CT ABDOMEN AND PELVIS WITH CONTRAST 9/23/2020 2:15 PM    CLINICAL HISTORY: Abdominal pain, diverticulitis suspected.    TECHNIQUE: CT scan of the abdomen and pelvis was performed following  injection of IV contrast. Multiplanar reformats were obtained. Dose  reduction techniques were used.  CONTRAST: 100 mL Isovue 370    COMPARISON: 6/9/2019.    FINDINGS:   LOWER CHEST: Normal.    HEPATOBILIARY: Fatty infiltration is seen through the liver. No focal  lesions and no biliary dilation. The portal vein is patent. Dependent  stones are seen within the gallbladder.    PANCREAS: Normal.    SPLEEN: Normal.    ADRENAL GLANDS: Normal.    KIDNEYS/BLADDER: Normal.    BOWEL: Stomach and small bowel are normal in appearance and caliber.  No evidence for obstruction. No evidence for appendicitis. Wall  thickening with adjacent inflammatory change and edema are noted  through the proximal sigmoid colon consistent with acute  diverticulitis. No evidence for perforation or abscess formation.    PELVIC ORGANS: Trace free fluid is present within the pelvis.    ADDITIONAL FINDINGS: None.    MUSCULOSKELETAL: Normal.      Impression    IMPRESSION:   1.  Acute diverticulitis of the proximal sigmoid colon. No evidence  for perforation or abscess formation.  2.  Fatty infiltration of the liver and cholelithiasis.    DIXON ZAMARRIPA MD   CBC with platelets differential     Status: None   Result Value Ref Range    WBC 8.1 4.0 - 11.0 10e9/L    RBC Count 4.94 4.4 - 5.9 10e12/L    Hemoglobin 14.3 13.3 - 17.7 g/dL    Hematocrit 42.5 40.0 - 53.0 %    MCV 86 78 - 100 fl    MCH 28.9 26.5 - 33.0 pg    MCHC 33.6 31.5 - 36.5 g/dL     RDW 12.4 10.0 - 15.0 %    Platelet Count 163 150 - 450 10e9/L    Diff Method Automated Method     % Neutrophils 76.6 %    % Lymphocytes 13.4 %    % Monocytes 8.6 %    % Eosinophils 1.0 %    % Basophils 0.2 %    % Immature Granulocytes 0.2 %    Nucleated RBCs 0 0 /100    Absolute Neutrophil 6.2 1.6 - 8.3 10e9/L    Absolute Lymphocytes 1.1 0.8 - 5.3 10e9/L    Absolute Monocytes 0.7 0.0 - 1.3 10e9/L    Absolute Eosinophils 0.1 0.0 - 0.7 10e9/L    Absolute Basophils 0.0 0.0 - 0.2 10e9/L    Abs Immature Granulocytes 0.0 0 - 0.4 10e9/L    Absolute Nucleated RBC 0.0    Comprehensive metabolic panel     Status: None   Result Value Ref Range    Sodium 137 133 - 144 mmol/L    Potassium 4.1 3.4 - 5.3 mmol/L    Chloride 106 94 - 109 mmol/L    Carbon Dioxide 26 20 - 32 mmol/L    Anion Gap 5 3 - 14 mmol/L    Glucose 86 70 - 99 mg/dL    Urea Nitrogen 13 7 - 30 mg/dL    Creatinine 1.02 0.66 - 1.25 mg/dL    GFR Estimate 88 >60 mL/min/[1.73_m2]    GFR Estimate If Black >90 >60 mL/min/[1.73_m2]    Calcium 9.0 8.5 - 10.1 mg/dL    Bilirubin Total 0.8 0.2 - 1.3 mg/dL    Albumin 3.7 3.4 - 5.0 g/dL    Protein Total 7.5 6.8 - 8.8 g/dL    Alkaline Phosphatase 62 40 - 150 U/L    ALT 35 0 - 70 U/L    AST 17 0 - 45 U/L   UA with Microscopic     Status: Abnormal   Result Value Ref Range    Color Urine Yellow     Appearance Urine Clear     Glucose Urine Negative NEG^Negative mg/dL    Bilirubin Urine Negative NEG^Negative    Ketones Urine Negative NEG^Negative mg/dL    Specific Gravity Urine 1.027 1.003 - 1.035    Blood Urine Negative NEG^Negative    pH Urine 5.0 5.0 - 7.0 pH    Protein Albumin Urine Negative NEG^Negative mg/dL    Urobilinogen mg/dL 0.0 0.0 - 2.0 mg/dL    Nitrite Urine Negative NEG^Negative    Leukocyte Esterase Urine Trace (A) NEG^Negative    Source Unspecified Urine     WBC Urine <1 0 - 5 /HPF    RBC Urine 1 0 - 2 /HPF    Mucous Urine Present (A) NEG^Negative /LPF   Asymptomatic COVID-19 Virus (Coronavirus) by PCR     Status:  None    Specimen: Nasopharyngeal   Result Value Ref Range    COVID-19 Virus PCR to [facility] - Source Nasopharyngeal     COVID-19 Virus PCR to [facility] - Result       Test received-See reflex to [facility]DL test SARS CoV2 (COVID-19) Virus RT-PCR   Basic metabolic panel     Status: Abnormal   Result Value Ref Range    Sodium 140 133 - 144 mmol/L    Potassium 3.9 3.4 - 5.3 mmol/L    Chloride 108 94 - 109 mmol/L    Carbon Dioxide 27 20 - 32 mmol/L    Anion Gap 5 3 - 14 mmol/L    Glucose 92 70 - 99 mg/dL    Urea Nitrogen 11 7 - 30 mg/dL    Creatinine 1.08 0.66 - 1.25 mg/dL    GFR Estimate 82 >60 mL/min/[1.73_m2]    GFR Estimate If Black >90 >60 mL/min/[1.73_m2]    Calcium 8.2 (L) 8.5 - 10.1 mg/dL   CBC with platelets differential     Status: Abnormal   Result Value Ref Range    WBC 7.0 4.0 - 11.0 10e9/L    RBC Count 4.42 4.4 - 5.9 10e12/L    Hemoglobin 12.7 (L) 13.3 - 17.7 g/dL    Hematocrit 38.9 (L) 40.0 - 53.0 %    MCV 88 78 - 100 fl    MCH 28.7 26.5 - 33.0 pg    MCHC 32.6 31.5 - 36.5 g/dL    RDW 12.5 10.0 - 15.0 %    Platelet Count 141 (L) 150 - 450 10e9/L    Diff Method Automated Method     % Neutrophils 66.9 %    % Lymphocytes 18.9 %    % Monocytes 11.6 %    % Eosinophils 2.0 %    % Basophils 0.3 %    % Immature Granulocytes 0.3 %    Nucleated RBCs 0 0 /100    Absolute Neutrophil 4.7 1.6 - 8.3 10e9/L    Absolute Lymphocytes 1.3 0.8 - 5.3 10e9/L    Absolute Monocytes 0.8 0.0 - 1.3 10e9/L    Absolute Eosinophils 0.1 0.0 - 0.7 10e9/L    Absolute Basophils 0.0 0.0 - 0.2 10e9/L    Abs Immature Granulocytes 0.0 0 - 0.4 10e9/L    Absolute Nucleated RBC 0.0    SARS-CoV-2 COVID-19 Virus (Coronavirus) RT-PCR Nasopharyngeal     Status: None    Specimen: Nasopharyngeal   Result Value Ref Range    SARS-CoV-2 Virus Specimen Source Nasopharyngeal     SARS-CoV-2 PCR Result NEGATIVE     SARS-CoV-2 PCR Comment       Testing was performed using the [employer] SARS-CoV-2 Assay on the [employer] Instrument System.   Additional information about this  Emergency Use Authorization (EUA) assay can be found via   the Lab Guide.       Medications   HYDROmorphone (PF) (DILAUDID) injection 0.5 mg (has no administration in time range)   ondansetron (ZOFRAN) injection 4 mg (has no administration in time range)   0.9% sodium chloride BOLUS (has no administration in time range)     Followed by   sodium chloride 0.9% infusion (has no administration in time range)       Assessments & Plan (with Medical Decision Making)     I have reviewed the nursing notes.    I have reviewed the findings, diagnosis, plan and need for follow up with the patient.      ED to Inpatient Handoff:    Discussed with Ita Villagomez   Patient accepted for Observation Stay  Pending studies include none  Code Status: Full Code         Discharge Medication List as of 9/24/2020  2:37 PM      START taking these medications    Details   ondansetron (ZOFRAN-ODT) 4 MG ODT tab Take 1 tablet (4 mg) by mouth every 8 hours as needed for nausea, Disp-12 tablet,R-0, E-Prescribe      oxyCODONE (ROXICODONE) 5 MG tablet Take 1-2 tablets (5-10 mg) by mouth every 3 hours as needed for moderate to severe pain, Disp-12 tablet,R-0, E-Prescribe           Final diagnoses:   Diverticulitis     45-year-old male presents to the emergency department with concern over hypotension history of left lower quadrant abdominal pain consistent with past history of diverticulitis with inconsistent use of antibiotics in the last several days.  He had stable vital signs upon arrival.  Physical exam findings as described above are consistent with left lower quadrant abdominal tenderness palpation with localized guarding, no rebound tenderness.  Further evaluation he had lab work including monitoring concerning CBC, BMP, urinalysis. CT of his abdomen confirmed  Diverticulitis of the proximal sigmoid colon without evidence of abscess or perforation.  I did discuss case with surgeon on-call Dr. Renner who did examine patient in the department  agrees no surgical intervention at this time.  Discussed case with hospitalist on-call Ita Villagomez who agreed to accept patient for inpatient stay.  Case was also discussed with ED physician Dr. Tristan Lancaster who helped guide medical decision making management.     Disclaimer: This note consists of symbols derived from keyboarding, dictation, and/or voice recognition software. As a result, there may be errors in the script that have gone undetected.  Please consider this when interpreting information found in the chart.    9/23/2020   [facility] EMERGENCY DEPARTMENT     Ayaka Preciado PA-C  09/26/20 2005

## 2020-09-23 NOTE — CONSULTS
Asked by Ayaka Preciado PA-C to see patient regarding his abd pain.    45-year-old male began having diffuse lower abdominal pain about a week and a half ago.  He took a short course of oral antibiotics (Cipro and Flagyl) but stopped this after 3 days because he felt better.  He now reports increasing pain and generalized tenderness in the left lower quadrant.  Pain is localized, 2-3 out of 10 without radiation.  Aggravated by movement and alleviated by rest.  Patient reports no nausea or vomiting and denies fevers at home.  No other associated symptoms.  Normal bowel movements.    Patient Active Problem List   Diagnosis     Benign essential hypertension     Obesity (BMI 35.0-39.9) with comorbidity (H)       No past medical history on file.    Past Surgical History:   Procedure Laterality Date     LAPAROSCOPIC HERNIORRHAPHY VENTRAL N/A 6/25/2019    Procedure: LAPAROSCOPIC ASSISTED OPEN HERNIORRHAPHY VENTRAL;  Surgeon: Jens Renner MD;  Location: [facility] OR       No family history on file.    Social History     Tobacco Use     Smoking status: Never Smoker     Smokeless tobacco: Never Used   Substance Use Topics     Alcohol use: Yes     Comment: 1 drink per week-occ.        History   Drug Use No       Current Outpatient Medications   Medication Sig Dispense Refill     acetaminophen (TYLENOL) 500 MG tablet Take 1,000 mg by mouth every 6 hours as needed for mild pain Taking 2 pills        ciprofloxacin (CIPRO) 500 MG tablet Take 500 mg by mouth every 12 hours       lisinopril (ZESTRIL) 40 MG tablet Take 1 tablet (40 mg) by mouth daily 90 tablet 0     metroNIDAZOLE (FLAGYL) 500 MG tablet Take 500 mg by mouth 3 times daily         Allergies   Allergen Reactions     Cephalexin      Penicillins Rash      CBC  Recent Labs   Lab Test 09/23/20  1344   WBC 8.1   RBC 4.94   HGB 14.3   HCT 42.5   MCV 86   MCH 28.9   MCHC 33.6   RDW 12.4          BMP  Recent Labs   Lab Test 09/23/20  1344      POTASSIUM 4.1   RADHA 9.0    CHLORIDE 106   CO2 26   BUN 13   CR 1.02   GLC 86       LFTs  Recent Labs   Lab Test 09/23/20  1344   PROTTOTAL 7.5   ALBUMIN 3.7   BILITOTAL 0.8   ALKPHOS 62   AST 17   ALT 35     Results for orders placed or performed during the hospital encounter of 09/23/20   CT Abdomen Pelvis w Contrast    Narrative    CT ABDOMEN AND PELVIS WITH CONTRAST 9/23/2020 2:15 PM    CLINICAL HISTORY: Abdominal pain, diverticulitis suspected.    TECHNIQUE: CT scan of the abdomen and pelvis was performed following  injection of IV contrast. Multiplanar reformats were obtained. Dose  reduction techniques were used.  CONTRAST: 100 mL Isovue 370    COMPARISON: 6/9/2019.    FINDINGS:   LOWER CHEST: Normal.    HEPATOBILIARY: Fatty infiltration is seen through the liver. No focal  lesions and no biliary dilation. The portal vein is patent. Dependent  stones are seen within the gallbladder.    PANCREAS: Normal.    SPLEEN: Normal.    ADRENAL GLANDS: Normal.    KIDNEYS/BLADDER: Normal.    BOWEL: Stomach and small bowel are normal in appearance and caliber.  No evidence for obstruction. No evidence for appendicitis. Wall  thickening with adjacent inflammatory change and edema are noted  through the proximal sigmoid colon consistent with acute  diverticulitis. No evidence for perforation or abscess formation.    PELVIC ORGANS: Trace free fluid is present within the pelvis.    ADDITIONAL FINDINGS: None.    MUSCULOSKELETAL: Normal.      Impression    IMPRESSION:   1.  Acute diverticulitis of the proximal sigmoid colon. No evidence  for perforation or abscess formation.  2.  Fatty infiltration of the liver and cholelithiasis.     CBC  Recent Labs   Lab Test 09/23/20  1344   WBC 8.1   RBC 4.94   HGB 14.3   HCT 42.5   MCV 86   MCH 28.9   MCHC 33.6   RDW 12.4          BMP  Recent Labs   Lab Test 09/23/20  1344      POTASSIUM 4.1   RADHA 9.0   CHLORIDE 106   CO2 26   BUN 13   CR 1.02   GLC 86       LFTs  Recent Labs   Lab Test 09/23/20  1344    PROTTOTAL 7.5   ALBUMIN 3.7   BILITOTAL 0.8   ALKPHOS 62   AST 17   ALT 35         ROS  Constitutional - Denies fevers, weight loss, malaise, lethargy  Neuro - Denies tremors or seizures  Pulmon - Denies SOB, dyspnea, hemoptysis, chronic cough or use of an inhaler  CV - Denies CP, SOB, lower extremity edema, difficulty w/ stairs, has never used NTG  GI - Denies hematemesis, BRBPR, melena, chronic diarrhea or epigastric pain   - Denies hematuria, difficulty voiding, h/o STDs  Hematology - Denies blood clotting disorders, chronic anemias  Dermatology - No melanomas or skin cancers  Rheumatology - No h/o RA  Pysch - Denies depression, bipolar d/o or schizophrenia    Exam:BP (!) 153/92   Pulse 62   Temp 98  F (36.7  C) (Temporal)   Resp 18   Wt 145.2 kg (320 lb)   SpO2 97%   BMI 38.95 kg/m      General - Alert and Oriented X4, NAD, well nourished  HEENT - Normocephalic, atraumatic  Neck - supple  Lungs - Clear to auscultation bilaterally  CV - Heart RRR  Abdomen - Soft, mildly tender to palpation suprapubic and left lower quadrant without rebound guarding, +BS, no hepatosplenomegaly, no palpable masses  Neuro - Full ROM, Strength 5/5 and major muscle groups, sensation intact  Extremities - No cyanosis, clubbing or edema    Assessment and plan: 45-year-old male with acute diverticulitis.  No evidence of abscess.  Patient should respond well to IV antibiotics and then 10 days of oral antibiotics.  As patient is already tried Cipro and Flagyl recommend discharge home on 10 days of oral Levaquin and Flagyl.  Recommend at least 2 days in the hospital for IV antibiotics before discharge.  Okay for clear liquid diet now and then advance as tolerated.  We will follow peripherally.    Jens Renner MD v

## 2020-09-23 NOTE — ED TRIAGE NOTES
Lower abdominal pain for approximately 1 week and blood in stool this morning.  Patient stated that he has nausea when pain is bad but no vomiting.  Patient denies pain with urination.  Patient's last bowel movement was approximately 30 minutes ago and was bright red.

## 2020-09-23 NOTE — PLAN OF CARE
"[facility] ADMISSION NOTE    Patient admitted to room 2304 at approximately 1745 via cart from emergency room. Patient was accompanied by transport tech.     Verbal SBAR report received from Kalpana prior to patient arrival.     Patient ambulated to bed with stand-by assist. Patient alert and oriented X 3. Pain is controlled without any medications. 0-10 Pain Scale: 8. Admission vital signs: Blood pressure (!) 148/88, pulse 66, temperature 98  F (36.7  C), temperature source Temporal, resp. rate 18, height 1.956 m (6' 5\"), weight (!) 156.9 kg (345 lb 14.4 oz), SpO2 99 %. Patient was oriented to plan of care, call light, bed controls, tv, telephone, bathroom, and visiting hours.     Risk Assessment    The following safety risks were identified during admission: none. Yellow risk band applied: YES.     Skin Initial Assessment    This writer admitted this patient and completed a full skin assessment and Herbert score in the Adult PCS flowsheet. Appropriate interventions initiated as needed.   Declined full skin assessment, no noted open areas..         Education    Patient has a [place] to Observation order: Yes  Observation education completed and documented: Yes      Heather Hale RN      "

## 2020-09-23 NOTE — ED NOTES
"Patient has  [place] to Observation  order. Patient has been given the Observation brochure -  What does Observation mean to me.\"  Patient has been given the opportunity to ask questions about observation status and their plan of care.      Karina Franco RN  "

## 2020-09-23 NOTE — H&P
[facility]    History and Physical - Hospitalist Service       Date of Admission:  9/23/2020    Assessment & Plan   Rashaun Camara is a 45 year old male admitted on 9/23/2020. He presented to the emergency department for evaluation of left lower quadrant abdominal pain and was found to have acute diverticulitis for which he is being admitted for further evaluation and treatment.    Acute diverticulitis of colon  Recurrent diverticulitis   History of 4-5 prior episodes of diverticulitis, PCP has given prn home prescription (Cipro / Flagyl) to use with onset of symptoms. Patient attempted antibiotics at home with initial improvement then worsening (although was not taking them as prescribed, stopped after 4 days when he felt better, then restarted when symptoms returned). CT abdomen & pelvis shows acute diverticulitis of proximal sigmoid colon, no perforation or abscess. Patient afebrile, no leukocytosis on admission. Case discussed between emergency department and general surgery. Patient being admitted for IV antibiotics given failed outpatient therapy vs non-compliance.  - General surgery consult, although no planned surgical intervention at this time  - IV ceftriaxone (1g q 12 hours) and Flagyl (500 mg q 8 hours), then likely discharge on oral Levaquin / Flagyl - recommended per general surgery  - Clear liquid diet, advance as tolerated  - Prn acetaminophen, oxycodone, and prn IV dilaudid for analgesia  - Antiemetics available  - Will likely need colonoscopy (can be done as outpatient)    Bright red blood per rectum   One time occurrence at home on 9/23/2020. No known history of GI bleed or hemorrhoids. Admit hemoglobin 14.3. Hemodynamically stable, not on anticoagulation / antiplatelets. Likely a diverticular bleed.  - CBC in am    Benign essential hypertension  Managed prior to admission with lisinopril 40 mg daily, continue.    Obesity (BMI 35.0-39.9) with comorbidity  BMI  "38.95. Contributes to morbidity and mortality.    COVID status  Tested as asymptomatic COVID screen based on admission protocol. No concern for active COVID infection on admission.   - COVID PCR pending  - No precautions or repeat testing required if negative       Diet: Clear Liquid Diet    DVT Prophylaxis: Pneumatic Compression Devices  Qureshi Catheter: not present  Code Status: Full - discussed with patient on admission         Disposition Plan   Expected discharge: 1-2 days, recommended to prior living arrangement once adequate pain management/ tolerating PO medications and antibiotic plan established, clearance by general surgery.  Entered: Ita Villagomez PA-C 09/23/2020, 8:55 PM     The patient's care was discussed with the Attending Physician, Dr. Jordi Jimenez and Patient.    Ita Villagomez PA-C  [facility]    ______________________________________________________________________    Chief Complaint   Left lower quadrant abdominal pain    History is obtained from the patient, review of EMR, and emergency department sign out from Ayaka Preciado PA-C.    History of Present Illness   Rashaun Camara is a 45 year old male who presented to the emergency department for evaluation of abdominal pain and bright red blood per rectum.    Known history of prior diverticulitis. Has an \"as needed\" home prescription from PCP for Cipro/Flagyl to take with onset of symptoms.     Developed mild abdominal symptoms 1.5 weeks ago, took Cipro/Flagyl x 3 days with complete resolution of symptoms, so he stopped antibiotics. Felt well for about 5 days.    Return of symptoms 3 days ago with increased gas / gas pains, started taking antibiotics again 3 days ago. Yesterday felt improved but not back to baseline, pain significantly worse this morning upon waking and persisting all day.    Patient attempted taking stool softener this morning in hopes pain would improve after bowel movement. Had a bowel " movement and noted significant amount of bright red blood in the toilet. Because of persisting pain and new onset of blood in stool, presented to the emergency department.    Pain located in left lower quadrant, is constant at 3/10 with waves of increasing pain up to 7/10, described as cramping and burning pains. Pain is worse when laying flat on his back. Improves with bearing down. Has tried acetaminophen and ibuprofen with some temporary improvement.    No known history of prior GI bleeds or hemorrhoids. Not on anticoagulation.    Denies nausea or vomiting, no recent diarrhea or constipation.     On review of systems patient notes myalgias and chills. Felt febrile at home but no documented fever when temperature taken. He is feeling generally weak but no focal deficits. No dysuria, but urine has been more concentrated lately. The remainder review of systems is negative.    Review of Systems    The 10 point Review of Systems is negative other than noted in the HPI or here.     Past Medical History    I have reviewed this patient's medical history and updated it with pertinent information if needed.   Past Medical History:   Diagnosis Date     Benign hypertension      Diverticulitis        Past Surgical History   I have reviewed this patient's surgical history and updated it with pertinent information if needed.  Past Surgical History:   Procedure Laterality Date     APPENDECTOMY  1994     LAPAROSCOPIC HERNIORRHAPHY VENTRAL N/A 6/25/2019    Procedure: LAPAROSCOPIC ASSISTED OPEN HERNIORRHAPHY VENTRAL;  Surgeon: Jens Renner MD;  Location: [facility] OR       Social History   I have reviewed this patient's social history and updated it with pertinent information if needed.  Social History     Tobacco Use     Smoking status: Never Smoker     Smokeless tobacco: Never Used   Substance Use Topics     Alcohol use: Yes     Comment: 2-3 drinks per week-occ.     Drug use: No       Family History   I have reviewed this patient's  family history and updated it with pertinent information if needed.  Family History   Problem Relation Age of Onset     Lung Cancer Father      Cerebrovascular Disease Paternal Grandfather        Prior to Admission Medications   Prior to Admission Medications   Prescriptions Last Dose Informant Patient Reported? Taking?   acetaminophen (TYLENOL) 500 MG tablet 9/23/2020 at 0730 Self Yes Yes   Sig: Take 1,000 mg by mouth every 6 hours as needed for mild pain Taking 2 pills    ciprofloxacin (CIPRO) 500 MG tablet 9/23/2020 at AM Self Yes Yes   Sig: Take 500 mg by mouth every 12 hours   lisinopril (ZESTRIL) 40 MG tablet 9/23/2020 at AM Self No Yes   Sig: Take 1 tablet (40 mg) by mouth daily   metroNIDAZOLE (FLAGYL) 500 MG tablet 9/23/2020 at AM, needs midday and PM dose Self Yes Yes   Sig: Take 500 mg by mouth 3 times daily      Facility-Administered Medications: None     Allergies   Allergies   Allergen Reactions     Cephalexin      Penicillins Rash       Physical Exam   Vital Signs: Temp: 98  F (36.7  C) Temp src: Oral BP: 121/72 Pulse: 64   Resp: 18 SpO2: 98 % O2 Device: None (Room air)    Weight: 345 lbs 14.43 oz    Constitutional: Alert, oriented, cooperative. No apparent distress, appears nontoxic, speaking in full coherent sentences.     Eyes: Eyes are clear, pupils are reactive. No scleral icterus.    HEENT: Oropharynx is clear and moist, no lesions. Normocephalic, no evidence of cranial trauma.      Cardiovascular: Regular rhythm and rate, normal S1 and S2. No murmur, rubs, or gallops. Peripheral pulses intact bilaterally. No lower extremity edema.    Respiratory: Lung sounds are clear to auscultation bilaterally without wheezes, rhonchi, or crackles.    GI: Soft, non-distended. Tender to palpation of left lower quadrant and lower mid abdomen > right lower quadrant, no rebound or guarding. No hepatosplenomegaly or masses appreciated. Normal bowel sounds.     Musculoskeletal: Without obvious deformity, normal  range of motion. Distal CMS intact.      Skin: Warm and dry, no rashes or ecchymoses. No mottling of skin.      Neurologic: Patient moves all extremities.  is symmetric. Gross strength and sensation are equal bilaterally.    Genitourinary: Deferred      Data   Data reviewed today: I reviewed all medications, new labs and imaging results over the last 24 hours. I personally reviewed the abdominal CT image(s) showing inflammation of the sigmoid colon.    Recent Labs   Lab 09/23/20  1344   WBC 8.1   HGB 14.3   MCV 86         POTASSIUM 4.1   CHLORIDE 106   CO2 26   BUN 13   CR 1.02   ANIONGAP 5   RADHA 9.0   GLC 86   ALBUMIN 3.7   PROTTOTAL 7.5   BILITOTAL 0.8   ALKPHOS 62   ALT 35   AST 17     Recent Results (from the past 24 hour(s))   CT Abdomen Pelvis w Contrast    Narrative    CT ABDOMEN AND PELVIS WITH CONTRAST 9/23/2020 2:15 PM    CLINICAL HISTORY: Abdominal pain, diverticulitis suspected.    TECHNIQUE: CT scan of the abdomen and pelvis was performed following  injection of IV contrast. Multiplanar reformats were obtained. Dose  reduction techniques were used.  CONTRAST: 100 mL Isovue 370    COMPARISON: 6/9/2019.    FINDINGS:   LOWER CHEST: Normal.    HEPATOBILIARY: Fatty infiltration is seen through the liver. No focal  lesions and no biliary dilation. The portal vein is patent. Dependent  stones are seen within the gallbladder.    PANCREAS: Normal.    SPLEEN: Normal.    ADRENAL GLANDS: Normal.    KIDNEYS/BLADDER: Normal.    BOWEL: Stomach and small bowel are normal in appearance and caliber.  No evidence for obstruction. No evidence for appendicitis. Wall  thickening with adjacent inflammatory change and edema are noted  through the proximal sigmoid colon consistent with acute  diverticulitis. No evidence for perforation or abscess formation.    PELVIC ORGANS: Trace free fluid is present within the pelvis.    ADDITIONAL FINDINGS: None.    MUSCULOSKELETAL: Normal.      Impression    IMPRESSION:    1.  Acute diverticulitis of the proximal sigmoid colon. No evidence  for perforation or abscess formation.  2.  Fatty infiltration of the liver and cholelithiasis.    DIXON ZAMARRIPA MD

## 2020-09-23 NOTE — ED NOTES
Lower abdominal pain for approximately 1 week with blood in stool today.  Patient complains of nausea when pain is really bad, no vomiting.  Patient denies pain with urination.  Last bowel movement was 30 minutes ago and was bright red.  Tylenol at 0730 this morning.

## 2020-09-24 VITALS
TEMPERATURE: 98.4 F | DIASTOLIC BLOOD PRESSURE: 66 MMHG | HEIGHT: 77 IN | OXYGEN SATURATION: 96 % | HEART RATE: 66 BPM | WEIGHT: 315 LBS | SYSTOLIC BLOOD PRESSURE: 112 MMHG | RESPIRATION RATE: 18 BRPM | BODY MASS INDEX: 37.19 KG/M2

## 2020-09-24 LAB
ANION GAP SERPL CALCULATED.3IONS-SCNC: 5 MMOL/L (ref 3–14)
BASOPHILS # BLD AUTO: 0 10E9/L (ref 0–0.2)
BASOPHILS NFR BLD AUTO: 0.3 %
BUN SERPL-MCNC: 11 MG/DL (ref 7–30)
CALCIUM SERPL-MCNC: 8.2 MG/DL (ref 8.5–10.1)
CHLORIDE SERPL-SCNC: 108 MMOL/L (ref 94–109)
CO2 SERPL-SCNC: 27 MMOL/L (ref 20–32)
CREAT SERPL-MCNC: 1.08 MG/DL (ref 0.66–1.25)
DIFFERENTIAL METHOD BLD: ABNORMAL
EOSINOPHIL # BLD AUTO: 0.1 10E9/L (ref 0–0.7)
EOSINOPHIL NFR BLD AUTO: 2 %
ERYTHROCYTE [DISTWIDTH] IN BLOOD BY AUTOMATED COUNT: 12.5 % (ref 10–15)
GFR SERPL CREATININE-BSD FRML MDRD: 82 ML/MIN/{1.73_M2}
GLUCOSE SERPL-MCNC: 92 MG/DL (ref 70–99)
HCT VFR BLD AUTO: 38.9 % (ref 40–53)
HGB BLD-MCNC: 12.7 G/DL (ref 13.3–17.7)
IMM GRANULOCYTES # BLD: 0 10E9/L (ref 0–0.4)
IMM GRANULOCYTES NFR BLD: 0.3 %
LABORATORY COMMENT REPORT: NORMAL
LYMPHOCYTES # BLD AUTO: 1.3 10E9/L (ref 0.8–5.3)
LYMPHOCYTES NFR BLD AUTO: 18.9 %
MCH RBC QN AUTO: 28.7 PG (ref 26.5–33)
MCHC RBC AUTO-ENTMCNC: 32.6 G/DL (ref 31.5–36.5)
MCV RBC AUTO: 88 FL (ref 78–100)
MONOCYTES # BLD AUTO: 0.8 10E9/L (ref 0–1.3)
MONOCYTES NFR BLD AUTO: 11.6 %
NEUTROPHILS # BLD AUTO: 4.7 10E9/L (ref 1.6–8.3)
NEUTROPHILS NFR BLD AUTO: 66.9 %
NRBC # BLD AUTO: 0 10*3/UL
NRBC BLD AUTO-RTO: 0 /100
PLATELET # BLD AUTO: 141 10E9/L (ref 150–450)
POTASSIUM SERPL-SCNC: 3.9 MMOL/L (ref 3.4–5.3)
RBC # BLD AUTO: 4.42 10E12/L (ref 4.4–5.9)
SARS-COV-2 RNA SPEC QL NAA+PROBE: NEGATIVE
SARS-COV-2 RNA SPEC QL NAA+PROBE: NORMAL
SODIUM SERPL-SCNC: 140 MMOL/L (ref 133–144)
SPECIMEN SOURCE: NORMAL
SPECIMEN SOURCE: NORMAL
WBC # BLD AUTO: 7 10E9/L (ref 4–11)

## 2020-09-24 PROCEDURE — 36415 COLL VENOUS BLD VENIPUNCTURE: CPT | Performed by: PHYSICIAN ASSISTANT

## 2020-09-24 PROCEDURE — G0378 HOSPITAL OBSERVATION PER HR: HCPCS

## 2020-09-24 PROCEDURE — 25000128 H RX IP 250 OP 636: Performed by: PHYSICIAN ASSISTANT

## 2020-09-24 PROCEDURE — 25800030 ZZH RX IP 258 OP 636: Performed by: PHYSICIAN ASSISTANT

## 2020-09-24 PROCEDURE — 25000132 ZZH RX MED GY IP 250 OP 250 PS 637: Performed by: PHYSICIAN ASSISTANT

## 2020-09-24 PROCEDURE — 96376 TX/PRO/DX INJ SAME DRUG ADON: CPT

## 2020-09-24 PROCEDURE — 99217 ZZC OBSERVATION CARE DISCHARGE: CPT | Performed by: INTERNAL MEDICINE

## 2020-09-24 PROCEDURE — 85025 COMPLETE CBC W/AUTO DIFF WBC: CPT | Performed by: PHYSICIAN ASSISTANT

## 2020-09-24 PROCEDURE — 80048 BASIC METABOLIC PNL TOTAL CA: CPT | Performed by: PHYSICIAN ASSISTANT

## 2020-09-24 RX ORDER — ONDANSETRON 4 MG/1
4 TABLET, ORALLY DISINTEGRATING ORAL EVERY 8 HOURS PRN
Qty: 12 TABLET | Refills: 0 | Status: SHIPPED | OUTPATIENT
Start: 2020-09-24 | End: 2021-01-04

## 2020-09-24 RX ORDER — METRONIDAZOLE 500 MG/1
500 TABLET ORAL 3 TIMES DAILY
Qty: 25 TABLET | Refills: 0 | Status: SHIPPED | OUTPATIENT
Start: 2020-09-24 | End: 2021-01-04

## 2020-09-24 RX ORDER — CIPROFLOXACIN 500 MG/1
500 TABLET, FILM COATED ORAL 2 TIMES DAILY
Qty: 17 TABLET | Refills: 0 | Status: SHIPPED | OUTPATIENT
Start: 2020-09-24 | End: 2021-01-04

## 2020-09-24 RX ORDER — OXYCODONE HYDROCHLORIDE 5 MG/1
5-10 TABLET ORAL
Qty: 12 TABLET | Refills: 0 | Status: SHIPPED | OUTPATIENT
Start: 2020-09-24 | End: 2021-01-04

## 2020-09-24 RX ADMIN — CEFTRIAXONE SODIUM 1 G: 1 INJECTION, SOLUTION INTRAVENOUS at 03:56

## 2020-09-24 RX ADMIN — OXYCODONE HYDROCHLORIDE 10 MG: 5 TABLET ORAL at 01:12

## 2020-09-24 RX ADMIN — OXYCODONE HYDROCHLORIDE 10 MG: 5 TABLET ORAL at 14:07

## 2020-09-24 RX ADMIN — SODIUM CHLORIDE: 9 INJECTION, SOLUTION INTRAVENOUS at 03:16

## 2020-09-24 RX ADMIN — OXYCODONE HYDROCHLORIDE 10 MG: 5 TABLET ORAL at 04:56

## 2020-09-24 RX ADMIN — OXYCODONE HYDROCHLORIDE 10 MG: 5 TABLET ORAL at 09:20

## 2020-09-24 RX ADMIN — METRONIDAZOLE 500 MG: 500 INJECTION, SOLUTION INTRAVENOUS at 08:06

## 2020-09-24 RX ADMIN — LISINOPRIL 40 MG: 40 TABLET ORAL at 07:55

## 2020-09-24 RX ADMIN — METRONIDAZOLE 500 MG: 500 INJECTION, SOLUTION INTRAVENOUS at 01:00

## 2020-09-24 ASSESSMENT — MIFFLIN-ST. JEOR
SCORE: 2573.38
SCORE: 2582.38

## 2020-09-24 NOTE — DISCHARGE SUMMARY
[facility]  Hospitalist Discharge Summary       Date of Admission:  9/23/2020  Date of Discharge:  9/24/2020  Discharging Provider: Ravinder Centeno MD      Discharge Diagnoses     Acute diverticulitis of colon  Recurrent diverticulitis   Bright red blood per rectum   Benign essential hypertension  Obesity (BMI 35.0-39.9) with comorbidity  COVID-19 negative    Follow-ups Needed After Discharge   Follow-up Appointments     Follow-up and recommended labs and tests      Follow up with primary care provider, [facility],   within 7 days for hospital follow- up.  The following labs/tests are   recommended: BMP, CBC.           Discharge Disposition   Discharged to home  Condition at discharge: Stable    Hospital Course   Rashaun Camara is a 45 year old male admitted on 9/23/2020. He presented to the emergency department for evaluation of left lower quadrant abdominal pain and was found to have acute diverticulitis for which he is being admitted for further evaluation and treatment.     Acute diverticulitis of colon  Recurrent diverticulitis   History of 4-5 prior episodes of diverticulitis, PCP has given prn home prescription (Cipro / Flagyl) to use with onset of symptoms. Patient attempted antibiotics at home with initial improvement then worsening (although was not taking them as prescribed, stopped after 4 days when he felt better, then restarted when symptoms returned). CT abdomen & pelvis shows acute diverticulitis of proximal sigmoid colon, no perforation or abscess. Patient afebrile, no leukocytosis on admission. Case discussed between emergency department and general surgery. Patient being admitted for IV antibiotics given failed outpatient therapy vs non-compliance.  - General surgery consult, although no planned surgical intervention at this time  - Initially treated with IV ceftriaxone (1g q 12 hours) and Flagyl (500 mg q 8 hours)  - Started on clear liquid diet,  then advanced to regular diet without difficulty  - Discharged on oral Cipro and Flagyl  - As needed acetaminophen and oxycodone for pain control  - Zofran as needed for nausea  - Will need colonoscopy (can be done as outpatient)     Bright red blood per rectum   One time occurrence at home on 9/23/2020. No known history of GI bleed or hemorrhoids. Admit hemoglobin 14.3. Hemodynamically stable, not on anticoagulation or antiplatelets. Likely a diverticular bleed.  - Hgb 12.7 in AM without recurrence of hematochezia     Benign essential hypertension  Managed prior to admission with lisinopril 40 mg daily, continue.     Obesity (BMI 35.0-39.9) with comorbidity  BMI 38.95. Contributes to morbidity and mortality.     COVID-19 negative  Tested as asymptomatic COVID screen based on admission protocol. No concern for active COVID infection on admission.   - COVID PCR negative  - No precautions or repeat testing required if negative    Consultations This Hospital Stay   SURGERY GENERAL IP CONSULT    Code Status   Full Code    Time Spent on this Encounter   I, Ravinder Centeno MD, personally saw the patient today and spent greater than 30 minutes discharging this patient.       Ravinder Centeno MD  [facility]  ______________________________________________________________________    Physical Exam   Vital Signs: Temp: 98.7  F (37.1  C) Temp src: Oral BP: 118/76 Pulse: 63   Resp: 16 SpO2: 95 % O2 Device: None (Room air)    Weight: 348 lbs 5.23 oz       Gen: Well nourished, well developed, alert and oriented x 3, no acute distressed  HEENT: Atraumatic, normocephalic; sclera non-injected, anicterric; oral mucosa moist, no lesion, no exudate  Lungs: Clear to ausculation, no wheezes, no rhonchi, no rales  Heart: Regular rate, regular rhythm, no gallops, no rubs, no murmurs  GI: Bowel sound normal, no hepatosplenomegaly, no masses, non-tender, non-distended, no guarding, no rebound  tenderness  Lymph: No lymphadenopathy, no edema  Skin: No rashes, no chronic venous stasis     Primary Care Physician   [facility]    Discharge Orders      Reason for your hospital stay    This is a 45 year old male admitted with diverticulitis.     Follow-up and recommended labs and tests    Follow up with primary care provider, [facility], within 7 days for hospital follow- up.  The following labs/tests are recommended: BMP, CBC.     Activity    Your activity upon discharge: activity as tolerated     Full Code     Diet    Follow this diet upon discharge: Orders Placed This Encounter      Regular Diet Adult       Significant Results and Procedures   Most Recent 3 CBC's:  Recent Labs   Lab Test 09/24/20  0540 09/23/20  1344 06/09/19  1758   WBC 7.0 8.1 7.2   HGB 12.7* 14.3 14.0   MCV 88 86 85   * 163 183     Most Recent 3 BMP's:  Recent Labs   Lab Test 09/24/20  0540 09/23/20  1344 06/09/19  1758    137 142   POTASSIUM 3.9 4.1 4.3   CHLORIDE 108 106 110*   CO2 27 26 27   BUN 11 13 20   CR 1.08 1.02 0.93   ANIONGAP 5 5 5   RADHA 8.2* 9.0 8.6   GLC 92 86 94     Most Recent 6 Bacteria Isolates From Any Culture (See EPIC Reports for Culture Details):  Recent Labs   Lab Test 11/01/19  1021 09/09/19  1315 06/09/19 2050   CULT No beta hemolytic Streptococcus Group A isolated No beta hemolytic Streptococcus Group A isolated No growth   ,   Results for orders placed or performed during the hospital encounter of 09/23/20   CT Abdomen Pelvis w Contrast    Narrative    CT ABDOMEN AND PELVIS WITH CONTRAST 9/23/2020 2:15 PM    CLINICAL HISTORY: Abdominal pain, diverticulitis suspected.    TECHNIQUE: CT scan of the abdomen and pelvis was performed following  injection of IV contrast. Multiplanar reformats were obtained. Dose  reduction techniques were used.  CONTRAST: 100 mL Isovue 370    COMPARISON: 6/9/2019.    FINDINGS:   LOWER CHEST: Normal.    HEPATOBILIARY: Fatty infiltration  is seen through the liver. No focal  lesions and no biliary dilation. The portal vein is patent. Dependent  stones are seen within the gallbladder.    PANCREAS: Normal.    SPLEEN: Normal.    ADRENAL GLANDS: Normal.    KIDNEYS/BLADDER: Normal.    BOWEL: Stomach and small bowel are normal in appearance and caliber.  No evidence for obstruction. No evidence for appendicitis. Wall  thickening with adjacent inflammatory change and edema are noted  through the proximal sigmoid colon consistent with acute  diverticulitis. No evidence for perforation or abscess formation.    PELVIC ORGANS: Trace free fluid is present within the pelvis.    ADDITIONAL FINDINGS: None.    MUSCULOSKELETAL: Normal.      Impression    IMPRESSION:   1.  Acute diverticulitis of the proximal sigmoid colon. No evidence  for perforation or abscess formation.  2.  Fatty infiltration of the liver and cholelithiasis.    DIXON ZAMARRIPA MD       Discharge Medications   Current Discharge Medication List      START taking these medications    Details   ondansetron (ZOFRAN-ODT) 4 MG ODT tab Take 1 tablet (4 mg) by mouth every 8 hours as needed for nausea  Qty: 12 tablet, Refills: 0    Associated Diagnoses: Diverticulitis      oxyCODONE (ROXICODONE) 5 MG tablet Take 1-2 tablets (5-10 mg) by mouth every 3 hours as needed for moderate to severe pain  Qty: 12 tablet, Refills: 0    Associated Diagnoses: Diverticulitis         CONTINUE these medications which have CHANGED    Details   ciprofloxacin (CIPRO) 500 MG tablet Take 1 tablet (500 mg) by mouth 2 times daily  Qty: 17 tablet, Refills: 0    Associated Diagnoses: Diverticulitis      metroNIDAZOLE (FLAGYL) 500 MG tablet Take 1 tablet (500 mg) by mouth 3 times daily  Qty: 25 tablet, Refills: 0    Associated Diagnoses: Diverticulitis         CONTINUE these medications which have NOT CHANGED    Details   acetaminophen (TYLENOL) 500 MG tablet Take 1,000 mg by mouth every 6 hours as needed for mild pain Taking 2  pills       lisinopril (ZESTRIL) 40 MG tablet Take 1 tablet (40 mg) by mouth daily  Qty: 90 tablet, Refills: 0    Comments: Needs yearly labs at next visit  Associated Diagnoses: Benign essential hypertension           Allergies   Allergies   Allergen Reactions     Cephalexin      Penicillins Rash

## 2020-09-24 NOTE — PROGRESS NOTES
"HD#2    Feeling better. Hungry.  Wants to go home today.  Passing flatus.    I/O last 3 completed shifts:  In: 1000 [IV Piggyback:1000]  Out: -     Patient Vitals for the past 24 hrs:   BP Temp Temp src Pulse Resp SpO2 Height Weight   09/24/20 0751 118/76 98.7  F (37.1  C) Oral 63 16 95 % -- --   09/24/20 0518 -- -- -- -- -- -- -- (!) 158 kg (348 lb 5.2 oz)   09/24/20 0357 125/72 98.6  F (37  C) Oral 55 16 95 % -- (!) 157.1 kg (346 lb 5.5 oz)   09/23/20 2244 120/71 97.5  F (36.4  C) Oral 59 16 93 % -- --   09/23/20 2105 -- -- -- -- 16 -- -- --   09/23/20 2020 -- -- -- -- 18 -- -- --   09/23/20 1900 121/72 -- -- -- -- -- -- --   09/23/20 1856 121/74 98  F (36.7  C) Oral 64 16 98 % -- --   09/23/20 1747 (!) 148/88 -- Oral 66 18 99 % 1.956 m (6' 5\") (!) 156.9 kg (345 lb 14.4 oz)   09/23/20 1715 -- -- -- -- -- 100 % -- --   09/23/20 1700 136/85 -- -- 59 -- 97 % -- --   09/23/20 1645 -- -- -- -- -- 99 % -- --   09/23/20 1630 (!) 160/92 -- -- 66 -- 97 % -- --   09/23/20 1615 -- -- -- -- -- 98 % -- --   09/23/20 1600 (!) 153/92 -- -- 62 -- 97 % -- --   09/23/20 1530 (!) 146/89 -- -- 61 -- 97 % -- --   09/23/20 1500 (!) 142/85 -- -- -- -- 98 % -- --   09/23/20 1430 (!) 152/85 -- -- 82 -- -- -- --   09/23/20 1400 123/73 -- -- 60 -- 99 % -- --   09/23/20 1221 122/82 98  F (36.7  C) Temporal 72 18 97 % -- 145.2 kg (320 lb)     ROS  CV - No CP or SOB  Resp - No SOB or dyspnea  GI - No nausea or vomiting   - No difficulty with urination    Exam:  AXO3 NAD  Neuro - Strength 5/5 all major groups, sensation intact, PERRL  Lungs - CTA  CV - RRR  Abd - Soft, non-distended, non-tender, +BS  Extr - No edema    A/P: Diverticulitis improving with IV abx. Labs normal. Advance diet. OK for discharge on 10 days of oral Levaquin and Flagyl.    Jens Renner MD   "

## 2020-09-24 NOTE — PLAN OF CARE
"  Problem: Adult Inpatient Plan of Care  Goal: Optimal Comfort and Wellbeing  Outcome: No Change    Abdomen nontender.  Bowel sounds in all four quadrants.  Denies passing flatus.  IV antibiotics given per MAR.      Update at 0549: 10 mg oxycodone given at 0456 for abdominal pain rated 7/10.  Pain decreased on reassessment.  Pain now cramping in nature and partially relieved by passing flatus; per patient \"things are moving now\".    "

## 2020-09-24 NOTE — PLAN OF CARE
[facility] DISCHARGE NOTE    Patient discharged to home at 5:30 PM via ambulation. Accompanied by spouse and staff. Discharge instructions reviewed with patient, opportunity offered to ask questions. Prescriptions sent to patients preferred pharmacy. All belongings sent with patient.    Heather Hale RN

## 2020-09-24 NOTE — PROGRESS NOTES
Appeared to be sleeping between cares.  CPAP from home on.  10 mg PRN oxycodone given for abdominal pain rated 5/10.  When asked if PRN was helpful, patient gave a thumbs up sign.

## 2020-09-26 ASSESSMENT — ENCOUNTER SYMPTOMS
CHOKING: 0
COLOR CHANGE: 0
RECTAL PAIN: 0
FREQUENCY: 0
PALPITATIONS: 0
MYALGIAS: 1
LIGHT-HEADEDNESS: 0
SHORTNESS OF BREATH: 0
NUMBNESS: 0
NAUSEA: 1
COUGH: 0
HEMATURIA: 0
SORE THROAT: 0
EYE DISCHARGE: 0
CHILLS: 0
EYE ITCHING: 0
DIZZINESS: 0
FLANK PAIN: 0
BLOOD IN STOOL: 1
EYE PAIN: 0
DYSURIA: 0
FEVER: 0
WHEEZING: 0
WEAKNESS: 0
VOMITING: 0
ABDOMINAL PAIN: 1
WOUND: 0
DIARRHEA: 0
EYE REDNESS: 0

## 2020-09-28 ENCOUNTER — TELEPHONE (OUTPATIENT)
Dept: INTERNAL MEDICINE | Facility: CLINIC | Age: 45
End: 2020-09-28

## 2020-09-28 NOTE — TELEPHONE ENCOUNTER
Diverticulitis  [facility]    ED/UC/IP follow up phone call:    RN please call to follow up.    Number of ED visits in past 12 months = 0    [place] VIRK

## 2020-09-29 NOTE — TELEPHONE ENCOUNTER
"ED/Discharge Protocol    \"Hi, my name is Veronique David RN, a registered nurse, and I am calling on behalf of s office at [facility].  I am calling to follow up and see how things are going for you after your recent visit.\"    \"I see that you were in the (ER/UC/IP) on 9/23/2020-9/24/2020.    How are you doing now that you are home?\" Im doing fine for the most part.\"    Is patient experiencing symptoms that may require a hospital visit?  No    Discharge Instructions    \"Let's review your discharge instructions.  What is/are the follow-up recommendations?  Follow up with primary care provider, [facility], within 7 days for hospital follow- up.  The following labs/tests are recommended: BMP, CBC    \"Were you instructed to make a follow-up appointment?\"  Pt. Response: Yes.  Has appointment been made?   Yes        Medications    \"How many new medications are you on since your hospitalization/ED visit?\"   Discharge Medications          Current Discharge Medication List            START taking these medications     Details   ondansetron (ZOFRAN-ODT) 4 MG ODT tab Take 1 tablet (4 mg) by mouth every 8 hours as needed for nausea  Qty: 12 tablet, Refills: 0     Associated Diagnoses: Diverticulitis       oxyCODONE (ROXICODONE) 5 MG tablet Take 1-2 tablets (5-10 mg) by mouth every 3 hours as needed for moderate to severe pain  Qty: 12 tablet, Refills: 0     Associated Diagnoses: Diverticulitis                CONTINUE these medications which have CHANGED     Details   ciprofloxacin (CIPRO) 500 MG tablet Take 1 tablet (500 mg) by mouth 2 times daily  Qty: 17 tablet, Refills: 0     Associated Diagnoses: Diverticulitis       metroNIDAZOLE (FLAGYL) 500 MG tablet Take 1 tablet (500 mg) by mouth 3 times daily  Qty: 25 tablet, Refills: 0     Associated Diagnoses: Diverticulitis                CONTINUE these medications which have NOT CHANGED     Details   acetaminophen (TYLENOL) 500 MG tablet Take 1,000 mg by mouth " "every 6 hours as needed for mild pain Taking 2 pills        lisinopril (ZESTRIL) 40 MG tablet Take 1 tablet (40 mg) by mouth daily  Qty: 90 tablet, Refills: 0     Comments: Needs yearly labs at next visit  Associated Diagnoses: Benign essential hypertension     \"Do you have questions about your medications?\"   No  \"Were you newly diagnosed with heart failure, COPD, diabetes or did you have a heart attack?\"   No  For patients on insulin: \"Did you start on insulin in the hospital or did you have your insulin dose changed?\"   n/a  Medication reconciliation completed? No, due to pt was driving, should have been done at hospital discharge. Has appt tomorrow.  Post Discharge Medication Reconciliation Status: medication reconcilation previously completed during another office visit.    Was MTM referral placed (*Make sure to put transitions as reason for referral)?   No    Call Summary    \"Do you have any questions or concerns about your condition or care plan at the moment?\"    No  Triage nurse advice given: None needed    Patient was in ER 0 in the past year (assess appropriateness of ER visits.)      \"If you have questions or things don't continue to improve, we encourage you contact us through the main clinic number,  [phone].  Even if the clinic is not open, triage nurses are available 24/7 to help you.     We would like you to know that our clinic has extended hours (provide information).  We also have urgent care (provide details on closest location and hours/contact info)\"      \"Thank you for your time and take care!\"          "

## 2020-09-30 ENCOUNTER — OFFICE VISIT (OUTPATIENT)
Dept: FAMILY MEDICINE | Facility: CLINIC | Age: 45
End: 2020-09-30
Payer: COMMERCIAL

## 2020-09-30 VITALS
WEIGHT: 315 LBS | TEMPERATURE: 97.1 F | DIASTOLIC BLOOD PRESSURE: 74 MMHG | BODY MASS INDEX: 40.44 KG/M2 | SYSTOLIC BLOOD PRESSURE: 120 MMHG | HEART RATE: 70 BPM | RESPIRATION RATE: 18 BRPM

## 2020-09-30 DIAGNOSIS — K52.9 CHRONIC DIARRHEA: ICD-10-CM

## 2020-09-30 DIAGNOSIS — K57.32 DIVERTICULITIS OF COLON: Primary | ICD-10-CM

## 2020-09-30 LAB
ANION GAP SERPL CALCULATED.3IONS-SCNC: 4 MMOL/L (ref 3–14)
BASOPHILS # BLD AUTO: 0 10E9/L (ref 0–0.2)
BASOPHILS NFR BLD AUTO: 0.3 %
BUN SERPL-MCNC: 16 MG/DL (ref 7–30)
CALCIUM SERPL-MCNC: 9 MG/DL (ref 8.5–10.1)
CHLORIDE SERPL-SCNC: 108 MMOL/L (ref 94–109)
CO2 SERPL-SCNC: 26 MMOL/L (ref 20–32)
CREAT SERPL-MCNC: 1.17 MG/DL (ref 0.66–1.25)
DIFFERENTIAL METHOD BLD: NORMAL
EOSINOPHIL # BLD AUTO: 0.2 10E9/L (ref 0–0.7)
EOSINOPHIL NFR BLD AUTO: 2.8 %
ERYTHROCYTE [DISTWIDTH] IN BLOOD BY AUTOMATED COUNT: 12.8 % (ref 10–15)
GFR SERPL CREATININE-BSD FRML MDRD: 75 ML/MIN/{1.73_M2}
GLUCOSE SERPL-MCNC: 83 MG/DL (ref 70–99)
HCT VFR BLD AUTO: 42.2 % (ref 40–53)
HGB BLD-MCNC: 14.1 G/DL (ref 13.3–17.7)
LYMPHOCYTES # BLD AUTO: 2 10E9/L (ref 0.8–5.3)
LYMPHOCYTES NFR BLD AUTO: 27.9 %
MCH RBC QN AUTO: 28.5 PG (ref 26.5–33)
MCHC RBC AUTO-ENTMCNC: 33.4 G/DL (ref 31.5–36.5)
MCV RBC AUTO: 85 FL (ref 78–100)
MONOCYTES # BLD AUTO: 0.9 10E9/L (ref 0–1.3)
MONOCYTES NFR BLD AUTO: 12 %
NEUTROPHILS # BLD AUTO: 4.1 10E9/L (ref 1.6–8.3)
NEUTROPHILS NFR BLD AUTO: 57 %
PLATELET # BLD AUTO: 243 10E9/L (ref 150–450)
POTASSIUM SERPL-SCNC: 4.4 MMOL/L (ref 3.4–5.3)
RBC # BLD AUTO: 4.94 10E12/L (ref 4.4–5.9)
SODIUM SERPL-SCNC: 138 MMOL/L (ref 133–144)
WBC # BLD AUTO: 7.2 10E9/L (ref 4–11)

## 2020-09-30 PROCEDURE — 80048 BASIC METABOLIC PNL TOTAL CA: CPT | Performed by: PHYSICIAN ASSISTANT

## 2020-09-30 PROCEDURE — 99495 TRANSJ CARE MGMT MOD F2F 14D: CPT | Performed by: PHYSICIAN ASSISTANT

## 2020-09-30 PROCEDURE — 36415 COLL VENOUS BLD VENIPUNCTURE: CPT | Performed by: PHYSICIAN ASSISTANT

## 2020-09-30 PROCEDURE — 85025 COMPLETE CBC W/AUTO DIFF WBC: CPT | Performed by: PHYSICIAN ASSISTANT

## 2020-09-30 ASSESSMENT — PAIN SCALES - GENERAL: PAINLEVEL: NO PAIN (0)

## 2020-09-30 NOTE — NURSING NOTE
"Chief Complaint   Patient presents with     [facility] F/U     [facility] 9/23/2020- 9/24/2020       Initial /74 (BP Location: Right arm, Patient Position: Chair, Cuff Size: Adult Large)   Pulse 70   Temp 97.1  F (36.2  C) (Tympanic)   Resp 18   Wt (!) 154.7 kg (341 lb)   BMI 40.44 kg/m   Estimated body mass index is 40.44 kg/m  as calculated from the following:    Height as of 9/23/20: 1.956 m (6' 5\").    Weight as of this encounter: 154.7 kg (341 lb).    Patient presents to the clinic using No DME    Health Maintenance that is potentially due pending provider review:  Flu shot    Pt declines to have .    Is there anyone who you would like to be able to receive your results? No  If yes have patient fill out JASON  Yanna Brown CMA    "

## 2020-09-30 NOTE — PROGRESS NOTES
Subjective     Rashaun Camara is a 45 year old male who presents to clinic today for the following health issues:    HPI   Hospital Follow-up Visit:    Hospital/Nursing Home/IP Rehab Facility: [facility]  Date of Admission: 9/23/2020  Date of Discharge: 9/24/2020  Reason(s) for Admission: diverticulitis      Was your hospitalization related to COVID-19? No   Problems taking medications regularly:  None  Medication changes since discharge: None  Problems adhering to non-medication therapy:  None    Summary of hospitalization:  [facility] discharge summary reviewed  Diagnostic Tests/Treatments reviewed.  Follow up needed: Colonoscopy  Other Healthcare Providers Involved in Patient s Care:         None  Update since discharge: improved.   Post Discharge Medication Reconciliation: discharge medications reconciled, continue medications without change.  Plan of care communicated with patient    Symptoms much improved. Denies any abdominal pain, fevers, chills.   Ongoing diarrhea, but that is chronic.      Review of Systems   Constitutional, HEENT, cardiovascular, pulmonary, gi and gu systems are negative, except as otherwise noted.      Objective    /74 (BP Location: Right arm, Patient Position: Chair, Cuff Size: Adult Large)   Pulse 70   Temp 97.1  F (36.2  C) (Tympanic)   Resp 18   Wt (!) 154.7 kg (341 lb)   BMI 40.44 kg/m    Body mass index is 40.44 kg/m .  Physical Exam   Constitutional: healthy, alert, and no distress  Head: Normocephalic. Atraumatic  Eyes: No conjunctival injection, sclera anicteric  Respiratory: No resp distress.  Musculoskeletal: extremities normal- no gross deformities noted, and normal muscle tone  Neurologic: Gait normal. CN 2-12 grossly intact  Psychiatric: mentation appears normal and affect normal/bright     Results for orders placed or performed in visit on 09/30/20   CBC with platelets and differential     Status: None   Result Value Ref Range    WBC 7.2 4.0 -  11.0 10e9/L    RBC Count 4.94 4.4 - 5.9 10e12/L    Hemoglobin 14.1 13.3 - 17.7 g/dL    Hematocrit 42.2 40.0 - 53.0 %    MCV 85 78 - 100 fl    MCH 28.5 26.5 - 33.0 pg    MCHC 33.4 31.5 - 36.5 g/dL    RDW 12.8 10.0 - 15.0 %    Platelet Count 243 150 - 450 10e9/L    % Neutrophils 57.0 %    % Lymphocytes 27.9 %    % Monocytes 12.0 %    % Eosinophils 2.8 %    % Basophils 0.3 %    Absolute Neutrophil 4.1 1.6 - 8.3 10e9/L    Absolute Lymphocytes 2.0 0.8 - 5.3 10e9/L    Absolute Monocytes 0.9 0.0 - 1.3 10e9/L    Absolute Eosinophils 0.2 0.0 - 0.7 10e9/L    Absolute Basophils 0.0 0.0 - 0.2 10e9/L    Diff Method Automated Method            Assessment & Plan   Diverticulitis of colon  Hospital notes reviewed. Symptoms have resolved. Vitals and exam are wnl. CBC wnl. BMP pending, but suspect this will be wnl as well. Colonoscopy ordered to further evaluate his diarrhea and recurrent diverticulitis.   - CBC with platelets and differential  - Basic metabolic panel    Chronic diarrhea  Pt with chronic diarrhea and recurrent diveritculitis. Recommended to get Colonoscopy as an outpatient after his symptoms resolve. Discussed proper use of Imoidum as the patient took this yesterday due to his loose stools. He will avoid this and instead use a fiber supplement.   - GASTROENTEROLOGY ADULT REF PROCEDURE ONLY; Future     Return in about 2 weeks (around 10/14/2020), or if symptoms worsen or fail to improve, for In-Clinic Visit.    Tor Cordero PA-C  [facility]

## 2020-10-13 DIAGNOSIS — I10 BENIGN ESSENTIAL HYPERTENSION: ICD-10-CM

## 2020-10-14 RX ORDER — LISINOPRIL 40 MG/1
TABLET ORAL
Qty: 90 TABLET | Refills: 2 | Status: SHIPPED | OUTPATIENT
Start: 2020-10-14 | End: 2021-09-07

## 2020-10-16 ENCOUNTER — TELEPHONE (OUTPATIENT)
Dept: FAMILY MEDICINE | Facility: CLINIC | Age: 45
End: 2020-10-16

## 2020-10-16 NOTE — TELEPHONE ENCOUNTER
Reason for Call:  Procedure  has reached out to schedule diagnostic colonoscopy.    Detailed comments: patient has failed to return call to schedule     No further action necessary for procedure  at this time    Phone Number Patient can be reached at: Home number on file [phone] (home)    Best Time: NA    Can we leave a detailed message on this number? Not Applicable    Call taken on 10/16/2020 at 7:27 AM by Denisse Giles

## 2020-12-03 DIAGNOSIS — Z11.59 ENCOUNTER FOR SCREENING FOR OTHER VIRAL DISEASES: Primary | ICD-10-CM

## 2020-12-27 ENCOUNTER — HEALTH MAINTENANCE LETTER (OUTPATIENT)
Age: 45
End: 2020-12-27

## 2021-01-04 RX ORDER — CYCLOBENZAPRINE HCL 10 MG
10 TABLET ORAL 3 TIMES DAILY PRN
COMMUNITY
End: 2021-04-06

## 2021-01-05 DIAGNOSIS — Z11.59 ENCOUNTER FOR SCREENING FOR OTHER VIRAL DISEASES: ICD-10-CM

## 2021-01-05 LAB
SARS-COV-2 RNA SPEC QL NAA+PROBE: NORMAL
SPECIMEN SOURCE: NORMAL

## 2021-01-05 PROCEDURE — U0005 INFEC AGEN DETEC AMPLI PROBE: HCPCS | Performed by: SURGERY

## 2021-01-05 PROCEDURE — U0003 INFECTIOUS AGENT DETECTION BY NUCLEIC ACID (DNA OR RNA); SEVERE ACUTE RESPIRATORY SYNDROME CORONAVIRUS 2 (SARS-COV-2) (CORONAVIRUS DISEASE [COVID-19]), AMPLIFIED PROBE TECHNIQUE, MAKING USE OF HIGH THROUGHPUT TECHNOLOGIES AS DESCRIBED BY CMS-2020-01-R: HCPCS | Performed by: SURGERY

## 2021-01-06 ENCOUNTER — ANESTHESIA EVENT (OUTPATIENT)
Dept: GASTROENTEROLOGY | Facility: CLINIC | Age: 46
End: 2021-01-06
Payer: COMMERCIAL

## 2021-01-06 LAB
LABORATORY COMMENT REPORT: NORMAL
SARS-COV-2 RNA RESP QL NAA+PROBE: NEGATIVE
SPECIMEN SOURCE: NORMAL

## 2021-01-06 NOTE — ANESTHESIA PREPROCEDURE EVALUATION
Anesthesia Pre-Procedure Evaluation    Patient: Rashaun Camara   MRN: [id number] : 1975          Preoperative Diagnosis: Chronic diarrhea [K52.9]    Procedure(s):  COLONOSCOPY    Past Medical History:   Diagnosis Date     Benign hypertension      Diverticulitis      Past Surgical History:   Procedure Laterality Date     APPENDECTOMY       LAPAROSCOPIC HERNIORRHAPHY VENTRAL N/A 2019    Procedure: LAPAROSCOPIC ASSISTED OPEN HERNIORRHAPHY VENTRAL;  Surgeon: Jens Renner MD;  Location: [facility] OR       Anesthesia Evaluation     . Pt has had prior anesthetic. Type: General    No history of anesthetic complications          ROS/MED HX    ENT/Pulmonary:     (+)BRODY risk factors hypertension, obese, , . .    Neurologic:  - neg neurologic ROS     Cardiovascular:     (+) hypertension----. : . . . :. .       METS/Exercise Tolerance:  >4 METS   Hematologic:  - neg hematologic  ROS       Musculoskeletal:  - neg musculoskeletal ROS       GI/Hepatic:     (+) Other GI/Hepatic diverticulitis      Renal/Genitourinary:  - ROS Renal section negative       Endo:     (+) Obesity (BMI 41), Other Endocrine Disorder morbid obesity.      Psychiatric:  - neg psychiatric ROS       Infectious Disease:  - neg infectious disease ROS       Malignancy:      - no malignancy   Other:    - neg other ROS                      Physical Exam  Normal systems: cardiovascular, pulmonary and dental    Airway   Mallampati: II  TM distance: >3 FB  Neck ROM: full    Dental     Cardiovascular       Pulmonary             Lab Results   Component Value Date    WBC 7.2 2020    HGB 14.1 2020    HCT 42.2 2020     2020    CRP 9.7 (H) 2019     2020    POTASSIUM 4.4 2020    CHLORIDE 108 2020    CO2 26 2020    BUN 16 2020    CR 1.17 2020    GLC 83 2020    RADHA 9.0 2020    ALBUMIN 3.7 2020    PROTTOTAL 7.5 2020    ALT 35 2020    AST 17 2020     "ALKPHOS 62 09/23/2020    BILITOTAL 0.8 09/23/2020    LIPASE 204 06/09/2019    TSH 3.16 09/27/2017       Preop Vitals  BP Readings from Last 3 Encounters:   09/30/20 120/74   09/24/20 112/66   11/01/19 130/82    Pulse Readings from Last 3 Encounters:   09/30/20 70   09/24/20 66   11/01/19 65      Resp Readings from Last 3 Encounters:   09/30/20 18   09/24/20 18   11/01/19 12    SpO2 Readings from Last 3 Encounters:   09/24/20 96%   11/01/19 94%   09/09/19 98%      Temp Readings from Last 1 Encounters:   09/30/20 36.2  C (97.1  F) (Tympanic)    Ht Readings from Last 1 Encounters:   09/23/20 1.956 m (6' 5\")      Wt Readings from Last 1 Encounters:   09/30/20 (!) 154.7 kg (341 lb)    Estimated body mass index is 40.44 kg/m  as calculated from the following:    Height as of 9/23/20: 1.956 m (6' 5\").    Weight as of 9/30/20: 154.7 kg (341 lb).       Anesthesia Plan      History & Physical Review  History and physical reviewed and following examination; no interval change.    ASA Status:  3 .    NPO Status:  > 4 hours    Plan for MAC Reason for MAC:  Deep or markedly invasive procedure (G8)           Postoperative Care      Consents  Anesthetic plan, risks, benefits and alternatives discussed with:  Patient..                 JARETT Serrato CRNA  "

## 2021-01-08 ENCOUNTER — HOSPITAL ENCOUNTER (OUTPATIENT)
Facility: CLINIC | Age: 46
Discharge: HOME | End: 2021-01-08
Attending: SURGERY | Admitting: SURGERY
Payer: COMMERCIAL

## 2021-01-08 ENCOUNTER — ANESTHESIA (OUTPATIENT)
Dept: GASTROENTEROLOGY | Facility: CLINIC | Age: 46
End: 2021-01-08
Payer: COMMERCIAL

## 2021-01-08 VITALS
BODY MASS INDEX: 37.19 KG/M2 | DIASTOLIC BLOOD PRESSURE: 93 MMHG | HEIGHT: 77 IN | WEIGHT: 315 LBS | SYSTOLIC BLOOD PRESSURE: 147 MMHG | HEART RATE: 67 BPM | RESPIRATION RATE: 16 BRPM | TEMPERATURE: 98.4 F | OXYGEN SATURATION: 96 %

## 2021-01-08 LAB — COLONOSCOPY: NORMAL

## 2021-01-08 PROCEDURE — 370N000017 HC ANESTHESIA TECHNICAL FEE, PER MIN: Performed by: SURGERY

## 2021-01-08 PROCEDURE — 45380 COLONOSCOPY AND BIOPSY: CPT | Performed by: SURGERY

## 2021-01-08 PROCEDURE — 88305 TISSUE EXAM BY PATHOLOGIST: CPT | Mod: 26 | Performed by: PATHOLOGY

## 2021-01-08 PROCEDURE — 250N000009 HC RX 250: Performed by: NURSE ANESTHETIST, CERTIFIED REGISTERED

## 2021-01-08 PROCEDURE — 258N000003 HC RX IP 258 OP 636: Performed by: SURGERY

## 2021-01-08 PROCEDURE — 88305 TISSUE EXAM BY PATHOLOGIST: CPT | Mod: TC | Performed by: SURGERY

## 2021-01-08 PROCEDURE — 250N000009 HC RX 250: Performed by: SURGERY

## 2021-01-08 PROCEDURE — 250N000011 HC RX IP 250 OP 636: Performed by: NURSE ANESTHETIST, CERTIFIED REGISTERED

## 2021-01-08 RX ORDER — GLYCOPYRROLATE 0.2 MG/ML
INJECTION, SOLUTION INTRAMUSCULAR; INTRAVENOUS PRN
Status: DISCONTINUED | OUTPATIENT
Start: 2021-01-08 | End: 2021-01-08

## 2021-01-08 RX ORDER — LIDOCAINE HYDROCHLORIDE 10 MG/ML
INJECTION, SOLUTION INFILTRATION; PERINEURAL PRN
Status: DISCONTINUED | OUTPATIENT
Start: 2021-01-08 | End: 2021-01-08

## 2021-01-08 RX ORDER — SODIUM CHLORIDE, SODIUM LACTATE, POTASSIUM CHLORIDE, CALCIUM CHLORIDE 600; 310; 30; 20 MG/100ML; MG/100ML; MG/100ML; MG/100ML
INJECTION, SOLUTION INTRAVENOUS CONTINUOUS
Status: DISCONTINUED | OUTPATIENT
Start: 2021-01-08 | End: 2021-01-08 | Stop reason: HOSPADM

## 2021-01-08 RX ORDER — PROPOFOL 10 MG/ML
INJECTION, EMULSION INTRAVENOUS PRN
Status: DISCONTINUED | OUTPATIENT
Start: 2021-01-08 | End: 2021-01-08

## 2021-01-08 RX ORDER — KETAMINE HYDROCHLORIDE 10 MG/ML
INJECTION, SOLUTION INTRAMUSCULAR; INTRAVENOUS PRN
Status: DISCONTINUED | OUTPATIENT
Start: 2021-01-08 | End: 2021-01-08

## 2021-01-08 RX ORDER — ONDANSETRON 2 MG/ML
4 INJECTION INTRAMUSCULAR; INTRAVENOUS
Status: DISCONTINUED | OUTPATIENT
Start: 2021-01-08 | End: 2021-01-08 | Stop reason: HOSPADM

## 2021-01-08 RX ORDER — LIDOCAINE 40 MG/G
CREAM TOPICAL
Status: DISCONTINUED | OUTPATIENT
Start: 2021-01-08 | End: 2021-01-08 | Stop reason: HOSPADM

## 2021-01-08 RX ORDER — PROPOFOL 10 MG/ML
INJECTION, EMULSION INTRAVENOUS CONTINUOUS PRN
Status: DISCONTINUED | OUTPATIENT
Start: 2021-01-08 | End: 2021-01-08

## 2021-01-08 RX ADMIN — GLYCOPYRROLATE 0.3 MG: 0.2 INJECTION, SOLUTION INTRAMUSCULAR; INTRAVENOUS at 08:04

## 2021-01-08 RX ADMIN — KETAMINE HYDROCHLORIDE 50 MG: 10 INJECTION INTRAMUSCULAR; INTRAVENOUS at 08:04

## 2021-01-08 RX ADMIN — LIDOCAINE HYDROCHLORIDE 30 MG: 10 INJECTION, SOLUTION INFILTRATION; PERINEURAL at 08:04

## 2021-01-08 RX ADMIN — LIDOCAINE HYDROCHLORIDE 0.1 ML: 10 INJECTION, SOLUTION EPIDURAL; INFILTRATION; INTRACAUDAL; PERINEURAL at 07:38

## 2021-01-08 RX ADMIN — PROPOFOL 50 MG: 10 INJECTION, EMULSION INTRAVENOUS at 08:19

## 2021-01-08 RX ADMIN — PROPOFOL 200 MCG/KG/MIN: 10 INJECTION, EMULSION INTRAVENOUS at 08:04

## 2021-01-08 RX ADMIN — SODIUM CHLORIDE, POTASSIUM CHLORIDE, SODIUM LACTATE AND CALCIUM CHLORIDE: 600; 310; 30; 20 INJECTION, SOLUTION INTRAVENOUS at 07:38

## 2021-01-08 ASSESSMENT — MIFFLIN-ST. JEOR: SCORE: 2549.15

## 2021-01-08 NOTE — ANESTHESIA CARE TRANSFER NOTE
Patient: Rashaun Camara    Procedure(s):  COLONOSCOPY, WITH POLYPECTOMY AND BIOPSY    Diagnosis: Chronic diarrhea [K52.9]  Diagnosis Additional Information: No value filed.    Anesthesia Type:   MAC     Note:  Airway :Room Air  Patient transferred to:Phase II  Handoff Report: Identifed the Patient, Identified the Reponsible Provider, Reviewed the pertinent medical history, Discussed the surgical course, Reviewed Intra-OP anesthesia mangement and issues during anesthesia, Set expectations for post-procedure period and Allowed opportunity for questions and acknowledgement of understanding      Vitals: (Last set prior to Anesthesia Care Transfer)    CRNA VITALS  1/8/2021 0754 - 1/8/2021 0824      1/8/2021             Pulse:  73    Ht Rate:  73    SpO2:  98 %                Electronically Signed By: JARETT Garland CRNA  January 8, 2021  8:24 AM

## 2021-01-08 NOTE — H&P
45 year old year old male here for colonoscopy for chronic diarrhea.    Patient Active Problem List   Diagnosis     Benign essential hypertension     Obesity (BMI 35.0-39.9) with comorbidity (H)     Diverticulitis of colon     Diverticulitis       Past Medical History:   Diagnosis Date     Benign hypertension      Diverticulitis        Past Surgical History:   Procedure Laterality Date     APPENDECTOMY  1994     LAPAROSCOPIC HERNIORRHAPHY VENTRAL N/A 6/25/2019    Procedure: LAPAROSCOPIC ASSISTED OPEN HERNIORRHAPHY VENTRAL;  Surgeon: Jens Renner MD;  Location: [facility]       @[facility] current outpatient medications on file.       Allergies   Allergen Reactions     Bees      Cephalexin      Penicillins Rash       Pt reports that he has never smoked. He has never used smokeless tobacco. He reports current alcohol use. He reports that he does not use drugs.    Exam:  There were no vitals taken for this visit.    Awake, Alert OX3  Lungs - CTA bilaterally  CV - RRR, no murmurs, distal pulses intact  Abd - soft, non-distended, non-tender, +BS  Extr - No cyanosis or edema    A/P 45 year old year old male in need of colonoscopy for chronic diarrhea. Risks, benefits, alternatives, and complications were discussed including the possibility of perforation and the patient agreed to proceed.    Hansel Hamilton MD

## 2021-01-08 NOTE — BRIEF OP NOTE
[facility]    Brief Operative Note    Pre-operative diagnosis: Chronic diarrhea [K52.9]   Post-operative diagnosis normal colon - no significant diverticulosis     Procedure: Procedure(s):  COLONOSCOPY, WITH POLYPECTOMY AND BIOPSY   Surgeon(s): Surgeon(s) and Role:     * Hansel Hamilton MD - Primary   Estimated blood loss: * No values recorded between 1/8/2021  8:10 AM and 1/8/2021  8:26 AM *    Specimens: ID Type Source Tests Collected by Time Destination   A : random biopsies Tissue Colon SURGICAL PATHOLOGY EXAM Hansel Hamilton MD 1/8/2021  8:23 AM       Findings: 1. Normal colon  2. Diverticulosis not seen  3. Entire colon biopsied for microscopic colitis

## 2021-01-08 NOTE — ANESTHESIA POSTPROCEDURE EVALUATION
Patient: Rashaun Camara    Procedure(s):  COLONOSCOPY    Diagnosis:Chronic diarrhea [K52.9]  Diagnosis Additional Information: No value filed.    Anesthesia Type:  MAC    Note:  Anesthesia Post Evaluation    Patient location during evaluation: Phase 2  Patient participation: Able to fully participate in evaluation  Level of consciousness: awake  Pain management: adequate  Airway patency: patent  Cardiovascular status: acceptable and hemodynamically stable  Respiratory status: acceptable, room air and spontaneous ventilation  Hydration status: acceptable  PONV: none     Anesthetic complications: None          Last vitals:  Vitals:    01/08/21 0706   BP: (!) 132/94   Pulse: 60   Resp: 16   Temp: 36.9  C (98.4  F)   SpO2: 96%         Electronically Signed By: JARETT Garland CRNA  January 8, 2021  8:07 AM

## 2021-01-11 LAB — COPATH REPORT: NORMAL

## 2021-04-06 ENCOUNTER — OFFICE VISIT (OUTPATIENT)
Dept: FAMILY MEDICINE | Facility: CLINIC | Age: 46
End: 2021-04-06
Payer: COMMERCIAL

## 2021-04-06 VITALS
HEART RATE: 69 BPM | WEIGHT: 315 LBS | OXYGEN SATURATION: 98 % | SYSTOLIC BLOOD PRESSURE: 126 MMHG | RESPIRATION RATE: 16 BRPM | BODY MASS INDEX: 37.19 KG/M2 | DIASTOLIC BLOOD PRESSURE: 80 MMHG | HEIGHT: 77 IN | TEMPERATURE: 97.8 F

## 2021-04-06 DIAGNOSIS — E66.01 MORBID OBESITY (H): ICD-10-CM

## 2021-04-06 DIAGNOSIS — M62.830 BACK MUSCLE SPASM: ICD-10-CM

## 2021-04-06 DIAGNOSIS — Z00.00 ROUTINE GENERAL MEDICAL EXAMINATION AT A HEALTH CARE FACILITY: Primary | ICD-10-CM

## 2021-04-06 LAB
CHOLEST SERPL-MCNC: 236 MG/DL
GLUCOSE SERPL-MCNC: 82 MG/DL (ref 70–99)
HDLC SERPL-MCNC: 50 MG/DL
LDLC SERPL CALC-MCNC: 168 MG/DL
NONHDLC SERPL-MCNC: 186 MG/DL
TRIGL SERPL-MCNC: 89 MG/DL

## 2021-04-06 PROCEDURE — 82947 ASSAY GLUCOSE BLOOD QUANT: CPT | Performed by: FAMILY MEDICINE

## 2021-04-06 PROCEDURE — 99396 PREV VISIT EST AGE 40-64: CPT | Performed by: FAMILY MEDICINE

## 2021-04-06 PROCEDURE — 80061 LIPID PANEL: CPT | Performed by: FAMILY MEDICINE

## 2021-04-06 PROCEDURE — 99213 OFFICE O/P EST LOW 20 MIN: CPT | Mod: 25 | Performed by: FAMILY MEDICINE

## 2021-04-06 PROCEDURE — 36415 COLL VENOUS BLD VENIPUNCTURE: CPT | Performed by: FAMILY MEDICINE

## 2021-04-06 RX ORDER — CYCLOBENZAPRINE HCL 10 MG
10 TABLET ORAL 2 TIMES DAILY PRN
Qty: 30 TABLET | Refills: 1 | Status: SHIPPED | OUTPATIENT
Start: 2021-04-06

## 2021-04-06 ASSESSMENT — ENCOUNTER SYMPTOMS
NAUSEA: 0
MYALGIAS: 0
DIZZINESS: 0
HEARTBURN: 0
HEMATURIA: 0
HEMATOCHEZIA: 0
CHILLS: 0
NERVOUS/ANXIOUS: 0
DIARRHEA: 0
PARESTHESIAS: 0
FEVER: 0
ABDOMINAL PAIN: 0
COUGH: 0
ARTHRALGIAS: 0
SORE THROAT: 0
FREQUENCY: 0
CONSTIPATION: 0
PALPITATIONS: 0
DYSURIA: 0
JOINT SWELLING: 0
SHORTNESS OF BREATH: 0
WEAKNESS: 0
HEADACHES: 0
EYE PAIN: 0

## 2021-04-06 ASSESSMENT — MIFFLIN-ST. JEOR: SCORE: 2626.26

## 2021-04-06 NOTE — PROGRESS NOTES
SUBJECTIVE:   CC: Rashaun Camara is an 45 year old male who presents for preventative health visit.     Patient has been advised of split billing requirements and indicates understanding: Yes  Healthy Habits:     Getting at least 3 servings of Calcium per day:  NO    Bi-annual eye exam:  Yes    Dental care twice a year:  NO    Sleep apnea or symptoms of sleep apnea:  Sleep apnea    Diet:  Regular (no restrictions)    Frequency of exercise:  1 day/week    Duration of exercise:  N/A    Taking medications regularly:  Yes    Medication side effects:  None    PHQ-2 Total Score: 0    Additional concerns today:  No      Today's PHQ-2 Score:   PHQ-2 ( 1999 [employer]) 4/6/2021   Q1: Little interest or pleasure in doing things 0   Q2: Feeling down, depressed or hopeless 0   PHQ-2 Score 0   Q1: Little interest or pleasure in doing things Not at all   Q2: Feeling down, depressed or hopeless Not at all   PHQ-2 Score 0       Abuse: Current or Past(Physical, Sexual or Emotional)- No  Do you feel safe in your environment? Yes    Have you ever done Advance Care Planning? (For example, a Health Directive, POLST, or a discussion with a medical provider or your loved ones about your wishes): No, advance care planning information given to patient to review.  Patient declined advance care planning discussion at this time.    Social History     Tobacco Use     Smoking status: Never Smoker     Smokeless tobacco: Never Used   Substance Use Topics     Alcohol use: Yes     Comment: 2-3 drinks per week-occ.         Alcohol Use 4/6/2021   Prescreen: >3 drinks/day or >7 drinks/week? No   Prescreen: >3 drinks/day or >7 drinks/week? -       Last PSA: No results found for: PSA    Reviewed orders with patient. Reviewed health maintenance and updated orders accordingly - Yes  Lab work is in process  Labs reviewed in EPIC  BP Readings from Last 3 Encounters:   04/06/21 126/80   01/08/21 (!) 147/93   09/30/20 120/74    Wt Readings from Last 3 Encounters:    04/06/21 (!) 162.4 kg (358 lb)   01/08/21 (!) 154.7 kg (341 lb)   09/30/20 (!) 154.7 kg (341 lb)                  Patient Active Problem List   Diagnosis     Benign essential hypertension     Obesity (BMI 35.0-39.9) with comorbidity (H)     Diverticulitis of colon     Diverticulitis     Past Surgical History:   Procedure Laterality Date     APPENDECTOMY  1994     COLONOSCOPY N/A 1/8/2021    Procedure: COLONOSCOPY, WITH POLYPECTOMY AND BIOPSY;  Surgeon: Hansel Hamilton MD;  Location: [facility]     LAPAROSCOPIC HERNIORRHAPHY VENTRAL N/A 6/25/2019    Procedure: LAPAROSCOPIC ASSISTED OPEN HERNIORRHAPHY VENTRAL;  Surgeon: Jens Renner MD;  Location: [facility] OR       Social History     Tobacco Use     Smoking status: Never Smoker     Smokeless tobacco: Never Used   Substance Use Topics     Alcohol use: Yes     Comment: 2-3 drinks per week-occ.     Family History   Problem Relation Age of Onset     Lung Cancer Father      Cerebrovascular Disease Paternal Grandfather          Current Outpatient Medications   Medication Sig Dispense Refill     cyclobenzaprine (FLEXERIL) 10 MG tablet Take 1 tablet (10 mg) by mouth 2 times daily as needed for muscle spasms 30 tablet 1     lisinopril (ZESTRIL) 40 MG tablet TAKE ONE TABLET BY MOUTH ONCE DAILY, NEED YEARLY LABS AT NEXT VISIT 90 tablet 2     acetaminophen (TYLENOL) 500 MG tablet Take 1,000 mg by mouth every 6 hours as needed for mild pain Taking 2 pills        Allergies   Allergen Reactions     Bees      Cephalexin      Penicillins Rash     Recent Labs   Lab Test 09/30/20  1551 09/24/20  0540 09/23/20  1344 06/09/19  1758 03/06/19  0826 02/13/18  0832 02/13/18  0832 09/27/17  0854   LDL  --   --   --   --   --   --  108* 108*   HDL  --   --   --   --   --   --  34* 38*   TRIG  --   --   --   --   --   --  109 142   ALT  --   --  35 42 31  --  52  --    CR 1.17 1.08 1.02 0.93 1.08   < > 0.94 0.99   GFRESTIMATED 75 82 88 >90 83   < > 88 83   GFRESTBLACK 87 >90 >90 >90 >90   < > >90 >90  "  POTASSIUM 4.4 3.9 4.1 4.3 5.0   < > 4.2 4.4   TSH  --   --   --   --   --   --   --  3.16    < > = values in this interval not displayed.        Reviewed and updated as needed this visit by clinical staff  Tobacco  Allergies  Meds   Med Hx  Surg Hx  Fam Hx  Soc Hx        Reviewed and updated as needed this visit by Provider                    Review of Systems   Constitutional: Negative for chills and fever.   HENT: Negative for congestion, ear pain, hearing loss and sore throat.    Eyes: Negative for pain and visual disturbance.   Respiratory: Negative for cough and shortness of breath.    Cardiovascular: Negative for chest pain, palpitations and peripheral edema.   Gastrointestinal: Negative for abdominal pain, constipation, diarrhea, heartburn, hematochezia and nausea.   Genitourinary: Negative for discharge, dysuria, frequency, genital sores, hematuria, impotence and urgency.   Musculoskeletal: Negative for arthralgias, joint swelling and myalgias.   Skin: Negative for rash.   Neurological: Negative for dizziness, weakness, headaches and paresthesias.   Psychiatric/Behavioral: Negative for mood changes. The patient is not nervous/anxious.        OBJECTIVE:   /80   Pulse 69   Temp 97.8  F (36.6  C) (Tympanic)   Resp 16   Ht 1.956 m (6' 5\")   Wt (!) 162.4 kg (358 lb)   SpO2 98%   BMI 42.45 kg/m      Physical Exam  GENERAL: alert, no distress and obese  EYES: Eyes grossly normal to inspection, PERRL and conjunctivae and sclerae normal  HENT: normal cephalic/atraumatic, nose and mouth without ulcers or lesions, oropharynx clear and oral mucous membranes moist  NECK: no adenopathy, no asymmetry, masses, or scars and thyroid normal to palpation  RESP: lungs clear to auscultation - no rales, rhonchi or wheezes  CV: regular rate and rhythm, normal S1 S2, no S3 or S4, no murmur, click or rub, no peripheral edema and peripheral pulses strong  ABDOMEN: soft, nontender, no hepatosplenomegaly, no masses " "and bowel sounds normal  MS: no gross musculoskeletal defects noted, no edema  SKIN: no suspicious lesions or rashes  NEURO: Normal strength and tone, mentation intact and speech normal  BACK: no CVA tenderness, no paralumbar tenderness  PSYCH: mentation appears normal, affect normal/bright      ASSESSMENT/PLAN:   (Z00.00) Routine general medical examination at a health care facility  (primary encounter diagnosis)  Comment: Fasting glucose and lipid panel ordered.  Healthy lifestyle modifications stressed.  Using CPAP for sleep apnea.  Recommended to have COVID-19 vaccine  Plan: Lipid panel, **Glucose FUTURE anytime          (M62.830) Back muscle spasm  Comment: Using Flexeril for occasional back spasms, prescription refilled  Plan: cyclobenzaprine (FLEXERIL) 10 MG tablet        With    (E66.01) Morbid obesity (H)  Comment: Healthy lifestyle modifications stressed including regular exercise, balanced diet, weight loss and limiting salt/caffeine/pop intake      COUNSELING:   Reviewed preventive health counseling, as reflected in patient instructions    Estimated body mass index is 42.45 kg/m  as calculated from the following:    Height as of this encounter: 1.956 m (6' 5\").    Weight as of this encounter: 162.4 kg (358 lb).     Weight management plan: Discussed healthy diet and exercise guidelines    He reports that he has never smoked. He has never used smokeless tobacco.      Counseling Resources:  ATP IV Guidelines  Pooled Cohorts Equation Calculator  FRAX Risk Assessment  ICSI Preventive Guidelines  Dietary Guidelines for Americans, 2010  USDA's MyPlate  ASA Prophylaxis  Lung CA Screening    Albaro Adan MD  [facility]  "

## 2021-04-26 ENCOUNTER — OFFICE VISIT (OUTPATIENT)
Dept: FAMILY MEDICINE | Facility: CLINIC | Age: 46
End: 2021-04-26
Payer: COMMERCIAL

## 2021-04-26 DIAGNOSIS — J02.9 SORE THROAT: ICD-10-CM

## 2021-04-26 DIAGNOSIS — B34.9 VIRAL SYNDROME: Primary | ICD-10-CM

## 2021-04-26 LAB
DEPRECATED S PYO AG THROAT QL EIA: NEGATIVE
LABORATORY COMMENT REPORT: NORMAL
SARS-COV-2 RNA RESP QL NAA+PROBE: NEGATIVE
SARS-COV-2 RNA RESP QL NAA+PROBE: NORMAL
SPECIMEN SOURCE: NORMAL
STREP GROUP A PCR: NOT DETECTED

## 2021-04-26 PROCEDURE — 99213 OFFICE O/P EST LOW 20 MIN: CPT | Performed by: FAMILY MEDICINE

## 2021-04-26 PROCEDURE — U0005 INFEC AGEN DETEC AMPLI PROBE: HCPCS | Performed by: FAMILY MEDICINE

## 2021-04-26 PROCEDURE — 87651 STREP A DNA AMP PROBE: CPT | Performed by: FAMILY MEDICINE

## 2021-04-26 PROCEDURE — U0003 INFECTIOUS AGENT DETECTION BY NUCLEIC ACID (DNA OR RNA); SEVERE ACUTE RESPIRATORY SYNDROME CORONAVIRUS 2 (SARS-COV-2) (CORONAVIRUS DISEASE [COVID-19]), AMPLIFIED PROBE TECHNIQUE, MAKING USE OF HIGH THROUGHPUT TECHNOLOGIES AS DESCRIBED BY CMS-2020-01-R: HCPCS | Performed by: FAMILY MEDICINE

## 2021-04-26 PROCEDURE — 99N1174 PR STATISTIC STREP A RAPID: Performed by: FAMILY MEDICINE

## 2021-04-26 NOTE — PROGRESS NOTES
A: viral syndrome    P: RST neg.  Await covid results.  Fluids, rest time.        S: Rashaun Camara is a 45 year old male with ST, mild cough.  A few days.  Son with similar sx before this started, sx now improved for him    No fever.  No covid exposures known.      No urine/stool changes    O:There were no vitals taken for this visit.   GEN: Alert and oriented, in no acute distress, not sob with talking  CV: RRR, no murmur  Neck: neck supple without mass or lymphadenopathy  ENT: oropharynx clear, no exudate or palate/tonsil asymmetry  RESP: lungs clear bilaterally, good effort  Walking fine, no sob    RST: negative

## 2021-09-05 DIAGNOSIS — I10 BENIGN ESSENTIAL HYPERTENSION: ICD-10-CM

## 2021-09-07 RX ORDER — LISINOPRIL 40 MG/1
TABLET ORAL
Qty: 90 TABLET | Refills: 0 | Status: SHIPPED | OUTPATIENT
Start: 2021-09-07 | End: 2021-12-15

## 2021-10-09 ENCOUNTER — HEALTH MAINTENANCE LETTER (OUTPATIENT)
Age: 46
End: 2021-10-09

## 2021-12-15 DIAGNOSIS — I10 BENIGN ESSENTIAL HYPERTENSION: ICD-10-CM

## 2021-12-20 RX ORDER — LISINOPRIL 40 MG/1
TABLET ORAL
Qty: 90 TABLET | Refills: 0 | Status: SHIPPED | OUTPATIENT
Start: 2021-12-20 | End: 2022-01-07

## 2022-01-07 ENCOUNTER — OFFICE VISIT (OUTPATIENT)
Dept: FAMILY MEDICINE | Facility: CLINIC | Age: 47
End: 2022-01-07
Payer: COMMERCIAL

## 2022-01-07 VITALS
RESPIRATION RATE: 18 BRPM | HEART RATE: 57 BPM | BODY MASS INDEX: 37.19 KG/M2 | SYSTOLIC BLOOD PRESSURE: 138 MMHG | TEMPERATURE: 97 F | DIASTOLIC BLOOD PRESSURE: 82 MMHG | OXYGEN SATURATION: 96 % | HEIGHT: 77 IN | WEIGHT: 315 LBS

## 2022-01-07 DIAGNOSIS — K57.32 DIVERTICULITIS OF COLON: ICD-10-CM

## 2022-01-07 DIAGNOSIS — F41.8 SITUATIONAL ANXIETY: ICD-10-CM

## 2022-01-07 DIAGNOSIS — I10 BENIGN ESSENTIAL HYPERTENSION: ICD-10-CM

## 2022-01-07 DIAGNOSIS — Z00.01 ENCOUNTER FOR ROUTINE ADULT PHYSICAL EXAM WITH ABNORMAL FINDINGS: Primary | ICD-10-CM

## 2022-01-07 DIAGNOSIS — E66.01 MORBID OBESITY (H): ICD-10-CM

## 2022-01-07 PROBLEM — T81.41XA INFECTION OF SUPERFICIAL INCISIONAL SURGICAL SITE AFTER PROCEDURE, INITIAL ENCOUNTER: Status: ACTIVE | Noted: 2022-01-07

## 2022-01-07 LAB
ALBUMIN SERPL-MCNC: 3.8 G/DL (ref 3.4–5)
ALP SERPL-CCNC: 58 U/L (ref 40–150)
ALT SERPL W P-5'-P-CCNC: 133 U/L (ref 0–70)
ANION GAP SERPL CALCULATED.3IONS-SCNC: 7 MMOL/L (ref 3–14)
AST SERPL W P-5'-P-CCNC: 61 U/L (ref 0–45)
BASOPHILS # BLD AUTO: 0 10E3/UL (ref 0–0.2)
BASOPHILS NFR BLD AUTO: 1 %
BILIRUB SERPL-MCNC: 0.4 MG/DL (ref 0.2–1.3)
BUN SERPL-MCNC: 15 MG/DL (ref 7–30)
CALCIUM SERPL-MCNC: 9.4 MG/DL (ref 8.5–10.1)
CHLORIDE BLD-SCNC: 107 MMOL/L (ref 94–109)
CO2 SERPL-SCNC: 26 MMOL/L (ref 20–32)
CREAT SERPL-MCNC: 0.99 MG/DL (ref 0.66–1.25)
EOSINOPHIL # BLD AUTO: 0.2 10E3/UL (ref 0–0.7)
EOSINOPHIL NFR BLD AUTO: 3 %
ERYTHROCYTE [DISTWIDTH] IN BLOOD BY AUTOMATED COUNT: 13 % (ref 10–15)
GFR SERPL CREATININE-BSD FRML MDRD: >90 ML/MIN/1.73M2
GLUCOSE BLD-MCNC: 84 MG/DL (ref 70–99)
HCT VFR BLD AUTO: 44.4 % (ref 40–53)
HGB BLD-MCNC: 15 G/DL (ref 13.3–17.7)
LYMPHOCYTES # BLD AUTO: 1.8 10E3/UL (ref 0.8–5.3)
LYMPHOCYTES NFR BLD AUTO: 30 %
MCH RBC QN AUTO: 29 PG (ref 26.5–33)
MCHC RBC AUTO-ENTMCNC: 33.8 G/DL (ref 31.5–36.5)
MCV RBC AUTO: 86 FL (ref 78–100)
MONOCYTES # BLD AUTO: 0.6 10E3/UL (ref 0–1.3)
MONOCYTES NFR BLD AUTO: 10 %
NEUTROPHILS # BLD AUTO: 3.5 10E3/UL (ref 1.6–8.3)
NEUTROPHILS NFR BLD AUTO: 57 %
PLATELET # BLD AUTO: 198 10E3/UL (ref 150–450)
POTASSIUM BLD-SCNC: 4.3 MMOL/L (ref 3.4–5.3)
PROT SERPL-MCNC: 8 G/DL (ref 6.8–8.8)
RBC # BLD AUTO: 5.18 10E6/UL (ref 4.4–5.9)
SODIUM SERPL-SCNC: 140 MMOL/L (ref 133–144)
WBC # BLD AUTO: 6.1 10E3/UL (ref 4–11)

## 2022-01-07 PROCEDURE — 36415 COLL VENOUS BLD VENIPUNCTURE: CPT | Performed by: NURSE PRACTITIONER

## 2022-01-07 PROCEDURE — 99214 OFFICE O/P EST MOD 30 MIN: CPT | Mod: 25 | Performed by: NURSE PRACTITIONER

## 2022-01-07 PROCEDURE — 99396 PREV VISIT EST AGE 40-64: CPT | Performed by: NURSE PRACTITIONER

## 2022-01-07 PROCEDURE — 85025 COMPLETE CBC W/AUTO DIFF WBC: CPT | Performed by: NURSE PRACTITIONER

## 2022-01-07 PROCEDURE — 80053 COMPREHEN METABOLIC PANEL: CPT | Performed by: NURSE PRACTITIONER

## 2022-01-07 RX ORDER — LISINOPRIL 40 MG/1
TABLET ORAL
Qty: 90 TABLET | Refills: 3 | Status: SHIPPED | OUTPATIENT
Start: 2022-01-07 | End: 2023-06-26

## 2022-01-07 RX ORDER — METRONIDAZOLE 500 MG/1
500 TABLET ORAL 2 TIMES DAILY
Qty: 14 TABLET | Refills: 0 | Status: SHIPPED | OUTPATIENT
Start: 2022-01-07 | End: 2022-02-16

## 2022-01-07 RX ORDER — CIPROFLOXACIN 500 MG/1
500 TABLET, FILM COATED ORAL 2 TIMES DAILY
Qty: 28 TABLET | Refills: 0 | Status: SHIPPED | OUTPATIENT
Start: 2022-01-07 | End: 2022-02-16

## 2022-01-07 ASSESSMENT — ENCOUNTER SYMPTOMS
ARTHRALGIAS: 0
FREQUENCY: 0
NERVOUS/ANXIOUS: 1
ABDOMINAL PAIN: 0
EYE PAIN: 0
HEMATOCHEZIA: 0
WEAKNESS: 0
DIZZINESS: 0
HEMATURIA: 0
FEVER: 0
COUGH: 0
SHORTNESS OF BREATH: 0
NAUSEA: 0
CHILLS: 0
MYALGIAS: 0
HEADACHES: 0
PALPITATIONS: 0
DIARRHEA: 0
HEARTBURN: 0
JOINT SWELLING: 0
CONSTIPATION: 0
PARESTHESIAS: 0
DYSURIA: 0
SORE THROAT: 0

## 2022-01-07 ASSESSMENT — PAIN SCALES - GENERAL: PAINLEVEL: NO PAIN (0)

## 2022-01-07 ASSESSMENT — MIFFLIN-ST. JEOR: SCORE: 2699.4

## 2022-01-07 ASSESSMENT — PATIENT HEALTH QUESTIONNAIRE - PHQ9
SUM OF ALL RESPONSES TO PHQ QUESTIONS 1-9: 3
5. POOR APPETITE OR OVEREATING: SEVERAL DAYS

## 2022-01-07 ASSESSMENT — ANXIETY QUESTIONNAIRES
1. FEELING NERVOUS, ANXIOUS, OR ON EDGE: SEVERAL DAYS
5. BEING SO RESTLESS THAT IT IS HARD TO SIT STILL: NOT AT ALL
2. NOT BEING ABLE TO STOP OR CONTROL WORRYING: NOT AT ALL
7. FEELING AFRAID AS IF SOMETHING AWFUL MIGHT HAPPEN: NOT AT ALL
3. WORRYING TOO MUCH ABOUT DIFFERENT THINGS: SEVERAL DAYS
GAD7 TOTAL SCORE: 4
6. BECOMING EASILY ANNOYED OR IRRITABLE: SEVERAL DAYS

## 2022-01-07 NOTE — PATIENT INSTRUCTIONS
Patient Education     Your Body s Response to Anxiety  Normal anxiety is part of the body s natural defense system. It's an alert to a threat that is unknown, vague, or comes from your own internal fears. While you re in this state, your feelings can range from a vague sense of worry to physical sensations such as a pounding heartbeat. These feelings make you want to react to the threat. An anxiety response is normal in many situations. But when you have an anxiety disorder, the same response can occur at the wrong times.   Anxiety can be helpful  Normal anxiety is a signal from your brain. It warns you of a threat. It's a normal response to help you prevent something. Or to decrease the bad effects of something you can't control. For example, anxiety is a normal response to situations that might harm your body, separate you from a loved one, or lose your job. The symptoms of anxiety can be physical and mental.   How does it feel?  People with anxiety may have:    Dizziness    Muscle tension or pain    Restlessness    Sleeplessness    Trouble focusing    Racing heartbeat    Fast breathing    Shaking or trembling    Stomachache    Diarrhea    Loss of energy    Sweating    Cold, clammy hands    Chest pain    Dry mouth  Anxiety can also be a problem  Anxiety can become a problem when it is hard to control, occurs for months, and interferes with important parts of your life. With an anxiety disorder, your body has the response described above, but in inappropriate ways. The response a person has depends on the anxiety disorder he or she has. With some disorders, the anxiety is way out of proportion to the threat that triggers it. With others, anxiety may occur even when there isn t a clear threat or trigger.   Who does it affect?  Some people are more likely to have lasting anxiety than others. It tends to run in families. And it affects more younger people than older people, and more women than men. But no age, race,  or gender is immune to anxiety problems.   Anxiety can be treated  The good news is that the anxiety that s disrupting your life can be treated. Check with your healthcare provider and rule out any physical problems that may be causing the anxiety symptoms. If an anxiety disorder is diagnosed, seek mental healthcare. This is an illness and it can respond to treatment. Most types of anxiety disorders will respond to talk therapy (counseling) and medicines. Working with your doctor or other healthcare provider, you can develop skills to help you cope with anxiety. You can also gain the perspective you need to overcome your fears. Good sources of support or guidance can be found at your local hospital, mental health clinic, or an employee assistance program.     How to cope with anxiety  Here are some things you can do to cope:    Do what you can.  Keep in mind that you can t control everything. Change what you can. And let the rest take its course.    Exercise. This is a great way to ease tension and help your body feel relaxed.    Stay away from caffeine and nicotine.  These can make anxiety symptoms worse.    Stay sober.  Don't use alcohol or unprescribed medicines. They only make things worse in the long run.    Learn more about anxiety disorders.  Keep track of helpful online resources and books you can use during stressful periods.    Try stress management. Try methods such as meditation.    Talk with others. Think about joining online or in-person support groups.    Get help. Find professional mental health services if your symptoms can't be managed or reduced with the above methods.  [employer] last reviewed this educational content on 4/1/2020 2000-2021 [employer]. All rights reserved. This information is not intended as a substitute for professional medical care. Always follow your healthcare professional's instructions.           Patient Education     Treating Anxiety Disorders with  Therapy    If you have an anxiety disorder, you don t have to suffer needlessly. Treatment is available. Therapy (also called counseling) is often a helpful treatment for anxiety disorders. With therapy, a trained professional (therapist) helps you face and learn to manage your anxiety. Therapy can be short-term or long-term based on your needs. In some cases, medicine may also be prescribed with therapy. It may take time before you notice how much therapy is helping, but stick with it. With therapy, you can feel better.   Cognitive behavioral therapy (CBT)  Cognitive behavioral therapy (CBT) teaches you to manage anxiety. It does this by helping you understand how you think and act when you re anxious. Research has shown CBT to be a very effective treatment for anxiety disorders. CBT involves homework and activities to build skills that teach you to cope with anxiety step by step. It can be done in a group or 1-on-1, and often takes place for a set number of sessions. CBT has 2 main parts:     Cognitive therapy. This helps you identify the negative, irrational thoughts that occur with your anxiety. You ll learn to replace these with more positive, realistic thoughts.    Behavioral therapy. This helps you change how you react to anxiety. You ll learn coping skills and methods for relaxing to help you better deal with anxiety.  Other forms of therapy  Other therapy methods may work better for you than CBT. Or, you may move from CBT to another form of therapy as your treatment needs change. This may mean meeting with a therapist by yourself or in a group. Therapy can also help you work through problems in your life, such as drug or alcohol dependence, that may be making your anxiety worse.   Getting better takes time  Therapy will help you feel better and teach you skills to help manage anxiety long term. But change doesn t happen right away. It takes a commitment from you. And treatment only works if you learn to  face the causes of your anxiety. So, you might feel worse before you feel better. This can sometimes make it hard to stick with it. But remember: Therapy is a very effective treatment. The results will be well worth it.   Helping yourself  If anxiety is wearing you down, here are some things you can do to cope:    Check with your healthcare provider and rule out any physical problems that may be causing the anxiety symptoms.    If you are diagnosed with an anxiety disorder, seek mental healthcare. This is an illness and it can respond to treatment. Most types of anxiety disorders will respond to talk therapy and medicine.    Educate yourself about anxiety disorders. Keep track of helpful online resources and books you can use during stressful periods.    Try stress management methods such as meditation.    Consider online or in-person support groups.    Don t fight your feelings. Anxiety feeds itself. The more you worry about it, the worse it gets. Instead, try to identify what might have triggered your anxiety. Then try to put this threat in perspective.    Keep in mind that you can t control everything about a situation. Change what you can and let the rest take its course.    Exercise -- it s a great way to relieve tension and help your body feel relaxed.    Examine your life for stress, and try to find ways to reduce it.    Avoid caffeine and nicotine. These can make anxiety symptoms worse.    Don't turn to alcohol or unprescribed medicines for relief. They only make things worse in the long run.  [employer] last reviewed this educational content on 5/1/2020 2000-2021 [employer]. All rights reserved. This information is not intended as a substitute for professional medical care. Always follow your healthcare professional's instructions.           Patient Education     Checking Your Blood Pressure  Step-by-Step    [employer] last reviewed this educational content on 4/1/2020 2000-2021 [employer]. All rights reserved. This information is not intended as a substitute for professional medical care. Always follow your healthcare professional's instructions.           Patient Education   Diet for Diverticular Disease  What is diverticular disease?   When the inner wall of your colon weakens and forms small pouches, you have diverticulosis. For most people, this causes no symptoms.   If the pouches become inflamed or infected, you have diverticulitis. Warning signs may include pain in the lower abdomen, chills and vomiting (throwing up).  These conditions are called diverticular disease.  What causes it?  The cause has been linked to many years of eating too little fiber. People who eat plenty of whole grains, fresh fruits and vegetables are less likely to get this disease.   Once the pouches have formed, there is no way to reverse them.   How much fiber should I get?  If you're healing from diverticulitis or surgery to remove the inflamed area, you will at first follow a low-fiber diet (less than 10 grams each day). Then, as your doctor advises, you may slowly add fiber. Increase your intake by 1 to 2 grams a day. Your goal will be 25 to 30 grams a day.   To avoid future problems, continue to get 25 to 30 grams of fiber each day.   How can fiber help?   A high-fiber diet will help you have regular bowel movements. Fiber adds bulk to the stool and helps it pass more easily. Fiber also helps prevent the pouches from growing larger or getting infected.  In the past, doctors have warned patients to avoid eating nuts, seeds and popcorn. They believed these foods could get caught in the pouches and inflame them. But recent studies do not support this idea.   Please ask your dietitian for the handout Fiber Content in Common Foods. It will show you how to get more fiber in your diet.  For informational purposes only. Not to replace the advice of your health care provider.   Copyright   2007  [facility]. All rights reserved. [employer] [id number] - REV 09/15.       Patient Education     Discharge Instructions for High Blood Pressure (Hypertension)  You have been diagnosed with high blood pressure (hypertension). This means the force of blood against your artery walls is too strong. It also means your heart is working hard to move blood. High blood pressure usually has no symptoms, but over time, it can cause serious health problems. High blood pressure raises your risk for heart attack, stroke, heart disease, heart failure, kidney disease, and vision loss. With help from your doctor, you can manage your blood pressure and protect your health.  Blood pressure measurements are given as 2 numbers. Systolic blood pressure is the upper number. This is the pressure when the heart contracts or pumps. Diastolic blood pressure is the lower number. This is the pressure when the heart relaxes between beats.  Blood pressure is categorized as normal, elevated, or stage 1 or stage 2 high blood pressure:    Normal blood pressure is systolic of less than 120 and diastolic of less than 80 (120/80) at rest    Elevated blood pressure is systolic of 120 to 129 and diastolic less than 80 at rest    Stage 1 high blood pressure is systolic is 130 to 139 or diastolic between 80 to 89 at rest    Stage 2 high blood pressure is when systolic is 140 or higher or the diastolic is 90 or higher at rest  Taking medicine    Learn to measure your own blood pressure. Keep a record of your results. Ask your doctor which readings mean that you need medical attention.    Take your blood pressure medicine exactly as directed. Don t skip doses. Missing doses can cause your blood pressure to get out of control.    If you do miss a dose (or doses) check with your healthcare provider about what to do.    Don't take medicines that contain heart stimulants, including over-the-counter medicines. Check for warnings about high blood pressure  on the label. Ask the pharmacist before purchasing something you haven't used before    Check with your doctor or pharmacist before taking a decongestant such as pseudoephedrine or phenylephrine. Some decongestants can worsen high blood pressure.    Lifestyle changes    Stay at a healthy weight. Get help to lose any extra pounds (kilograms). Often times meeting with a dietitian can help you identify changes that can be made to your diet to help with weight loss.    Cut back on salt.  ? Limit canned, dried, packaged, and fast foods.  ? Don t add salt to your food at the table.  ? Season foods with herbs instead of salt when you cook.  ? Request no added salt when you go to a restaurant.  ? The American Heart Association (AHA) says the ideal amount of sodium is no more than 1,500 mg a day.  But because Americans eat so much salt, you can make a positive change by cutting back to even 2,300 mg of sodium a day (1 teaspoon).     Follow the DASH (Dietary Approaches to Stop Hypertension) eating plan. This plan recommends vegetables, fruits, whole gains, and other heart healthy foods.    Eat food rich in potassium.    Begin an exercise program. Ask your healthcare provider how to get started. The AHA recommends aerobic exercise 3 to 4 times a week for an average of 40 minutes at a time to lower blood pressure, with your provider's approval. Simple activities such as walking or gardening can help.    Break the smoking habit. Enroll in a stop-smoking program to improve your chances of success. Ask your healthcare provider about programs and medicines to help you stop smoking.    Limit drinks that contain caffeine such as coffee, black or green tea, and cola to 2 per day.    Never take stimulants such as amphetamines or cocaine. These drugs can be deadly for someone with high blood pressure.    Control your stress. Learn ways to manage stress.    Limit alcohol to no more than 1 drink a day for women and 2 drinks a day for  men.    Follow-up care  Make a follow-up appointment as directed.  When to call your healthcare provider  Call your healthcare provider right away if you have any of the following:    Chest pain or shortness of breath ( call 911)    Moderate to severe headache    Weakness in the muscles of your face, arms, or legs    Trouble speaking    Extreme drowsiness    Confusion    Fainting or dizziness    Pulsating or rushing sound in your ears    Unexplained nosebleed    Weakness, tingling, or numbness of your face, arms, or legs    Change in vision    Blood pressure measured at home that is greater than 180/110  [employer] last reviewed this educational content on 8/1/2019 2000-2021 [employer]. All rights reserved. This information is not intended as a substitute for professional medical care. Always follow your healthcare professional's instructions.

## 2022-01-07 NOTE — PROGRESS NOTES
SUBJECTIVE:   CC: Rashaun Camara is an 46 year old male who presents for preventative health visit.       Patient has been advised of split billing requirements and indicates understanding: Yes  Healthy Habits:     Getting at least 3 servings of Calcium per day:  Yes    Bi-annual eye exam:  Yes    Dental care twice a year:  NO    Sleep apnea or symptoms of sleep apnea:  Sleep apnea    Diet:  Other    Frequency of exercise:  1 day/week    Duration of exercise:  Less than 15 minutes    Taking medications regularly:  Yes    Medication side effects:  None    PHQ-2 Total Score: 1    Additional concerns today:  Yes  Anxiety  Pertinent negatives include no abdominal pain, arthralgias, chest pain, chills, congestion, coughing, fever, headaches, joint swelling, myalgias, nausea, rash, sore throat or weakness.     HTN  Taking medications as directed  No side effects  Due for follow up labs today    Depression or Anxiety - New Diagnosis   HAS BEEN MORE PROMINENT  USING ETOH TO CONTROL   MORE OFTEN irritable with 8 yr old son and wife  Desires intervention but hesitant about medications for same.  Would be willing to try psychotherapy.       Today's PHQ-2 Score:   PHQ-2 ( 1999 Pfizer) 1/7/2022   Q1: Little interest or pleasure in doing things 0   Q2: Feeling down, depressed or hopeless 1   PHQ-2 Score 1   PHQ-2 Total Score (12-17 Years)- Positive if 3 or more points; Administer PHQ-A if positive -   Q1: Little interest or pleasure in doing things Not at all   Q2: Feeling down, depressed or hopeless Several days   PHQ-2 Score 1     DIVERTICULITIS FOR YEARS  JUST GOT OVER A RECENT BOUT  TOOK FLAGYL AND CIPRO SYMPTOMS GONE  WOULD LIKE TO HAVE ON HAND AT HOME INCASE OF FLARE   HAS KNOW IBS     Obesity   [employer] works for him has lost 100 lbs in the past and regained again  NEEDS TO KEEP UP WITH HIS FIBER   TAKING 5 PSYLLIUM A DAY  EATING ONCE A DAY most days  Working on increased activity  Would be interested in working with the  weight management team to discuss other options for weight management thru dietary changes and possible medications.         Abuse: Current or Past(Physical, Sexual or Emotional)- No  Do you feel safe in your environment? Yes        Social History     Tobacco Use     Smoking status: Never Smoker     Smokeless tobacco: Never Used   Substance Use Topics     Alcohol use: Yes     Comment: 2-3 drinks per week-occ.     If you drink alcohol do you typically have >3 drinks per day or >7 drinks per week? No    Alcohol Use 1/7/2022   Prescreen: >3 drinks/day or >7 drinks/week? No   Prescreen: >3 drinks/day or >7 drinks/week? -       Last PSA: No results found for: PSA    Reviewed orders with patient. Reviewed health maintenance and updated orders accordingly -   Lab work is in process  Labs reviewed in EPIC  BP Readings from Last 3 Encounters:   01/07/22 138/82   04/06/21 126/80   01/08/21 (!) 147/93    Wt Readings from Last 3 Encounters:   01/07/22 (!) 170.2 kg (375 lb 3.2 oz)   04/06/21 (!) 162.4 kg (358 lb)   01/08/21 (!) 154.7 kg (341 lb)                  Patient Active Problem List   Diagnosis     Benign essential hypertension     Obesity (BMI 35.0-39.9) with comorbidity (H)     Diverticulitis of colon     Diverticulitis     Infection of superficial incisional surgical site after procedure, initial encounter     Situational anxiety     Past Surgical History:   Procedure Laterality Date     APPENDECTOMY  1994     COLONOSCOPY N/A 1/8/2021    Procedure: COLONOSCOPY, WITH POLYPECTOMY AND BIOPSY;  Surgeon: Hansel Hamilton MD;  Location: [facility]     LAPAROSCOPIC HERNIORRHAPHY VENTRAL N/A 6/25/2019    Procedure: LAPAROSCOPIC ASSISTED OPEN HERNIORRHAPHY VENTRAL;  Surgeon: Jens Renner MD;  Location: [facility]       Social History     Tobacco Use     Smoking status: Never Smoker     Smokeless tobacco: Never Used   Substance Use Topics     Alcohol use: Yes     Comment: 2-3 drinks per week-occ.     Family History   Problem Relation Age  of Onset     Lung Cancer Father      Cerebrovascular Disease Paternal Grandfather          Current Outpatient Medications   Medication Sig Dispense Refill     acetaminophen (TYLENOL) 500 MG tablet Take 1,000 mg by mouth every 6 hours as needed for mild pain Taking 2 pills        ciprofloxacin (CIPRO) 500 MG tablet Take 1 tablet (500 mg) by mouth 2 times daily 28 tablet 0     cyclobenzaprine (FLEXERIL) 10 MG tablet Take 1 tablet (10 mg) by mouth 2 times daily as needed for muscle spasms 30 tablet 1     lisinopril (ZESTRIL) 40 MG tablet TAKE ONE TABLET BY MOUTH ONCE DAILY 90 tablet 3     metroNIDAZOLE (FLAGYL) 500 MG tablet Take 1 tablet (500 mg) by mouth 2 times daily 14 tablet 0     Allergies   Allergen Reactions     Bees      Cephalexin      Pcn [Penicillins] Rash       Reviewed and updated as needed this visit by clinical staff  Tobacco  Allergies  Meds  Problems  Med Hx  Surg Hx  Fam Hx  Soc Hx         Reviewed and updated as needed this visit by Provider  Tobacco  Allergies  Meds  Problems  Med Hx  Surg Hx  Fam Hx        Past Medical History:   Diagnosis Date     Benign hypertension      Diverticulitis       Past Surgical History:   Procedure Laterality Date     APPENDECTOMY  1994     COLONOSCOPY N/A 1/8/2021    Procedure: COLONOSCOPY, WITH POLYPECTOMY AND BIOPSY;  Surgeon: Hansel Hamilton MD;  Location: [facility]     LAPAROSCOPIC HERNIORRHAPHY VENTRAL N/A 6/25/2019    Procedure: LAPAROSCOPIC ASSISTED OPEN HERNIORRHAPHY VENTRAL;  Surgeon: Jens Renner MD;  Location: [facility]       Review of Systems   Constitutional: Negative for chills and fever.   HENT: Negative for congestion, ear pain, hearing loss and sore throat.    Eyes: Negative for pain and visual disturbance.   Respiratory: Negative for cough and shortness of breath.    Cardiovascular: Negative for chest pain, palpitations and peripheral edema.   Gastrointestinal: Negative for abdominal pain, constipation, diarrhea, heartburn, hematochezia and  "nausea.   Genitourinary: Negative for dysuria, frequency, genital sores, hematuria, impotence, penile discharge and urgency.   Musculoskeletal: Negative for arthralgias, joint swelling and myalgias.   Skin: Negative for rash.   Neurological: Negative for dizziness, weakness, headaches and paresthesias.   Psychiatric/Behavioral: Negative for mood changes. The patient is nervous/anxious.      CONSTITUTIONAL: NEGATIVE for fever, chills, change in weight  INTEGUMENTARY/SKIN: NEGATIVE for worrisome rashes, moles or lesions  EYES: NEGATIVE for vision changes or irritation  ENT: NEGATIVE for ear, mouth and throat problems  RESP: NEGATIVE for significant cough or SOB  CV: NEGATIVE for chest pain, palpitations or peripheral edema  GI: NEGATIVE for nausea, abdominal pain, heartburn, or change in bowel habits   male: negative for dysuria, hematuria, decreased urinary stream, erectile dysfunction, urethral discharge  MUSCULOSKELETAL: NEGATIVE for significant arthralgias or myalgia  NEURO: NEGATIVE for weakness, dizziness or paresthesias  PSYCHIATRIC: NEGATIVE for changes in mood or affect    OBJECTIVE:   /82 (BP Location: Right arm, Patient Position: Sitting, Cuff Size: Adult Large)   Pulse 57   Temp 97  F (36.1  C) (Tympanic)   Resp 18   Ht 1.956 m (6' 5.01\")   Wt (!) 170.2 kg (375 lb 3.2 oz)   SpO2 96%   BMI 44.48 kg/m      Physical Exam  GENERAL: healthy, alert and no distress  EYES: Eyes grossly normal to inspection, PERRL and conjunctivae and sclerae normal  HENT: ear canals and TM's normal, nose and mouth without ulcers or lesions  NECK: no adenopathy, no asymmetry, masses, or scars and thyroid normal to palpation  RESP: lungs clear to auscultation - no rales, rhonchi or wheezes  CV: regular rate and rhythm, normal S1 S2, no S3 or S4, no murmur, click or rub, no peripheral edema and peripheral pulses strong  ABDOMEN: soft, nontender, no hepatosplenomegaly, no masses and bowel sounds normal  MS: no gross " musculoskeletal defects noted, no edema  SKIN: scarring from recurrent boils abdominal pannus   NEURO: Normal strength and tone, mentation intact and speech normal  PSYCH: mentation appears normal, affect normal/bright    Diagnostic Test Results:  Labs reviewed in Epic  Results for orders placed or performed in visit on 01/07/22   CBC with platelets and differential     Status: None   Result Value Ref Range    WBC Count 6.1 4.0 - 11.0 10e3/uL    RBC Count 5.18 4.40 - 5.90 10e6/uL    Hemoglobin 15.0 13.3 - 17.7 g/dL    Hematocrit 44.4 40.0 - 53.0 %    MCV 86 78 - 100 fL    MCH 29.0 26.5 - 33.0 pg    MCHC 33.8 31.5 - 36.5 g/dL    RDW 13.0 10.0 - 15.0 %    Platelet Count 198 150 - 450 10e3/uL    % Neutrophils 57 %    % Lymphocytes 30 %    % Monocytes 10 %    % Eosinophils 3 %    % Basophils 1 %    Absolute Neutrophils 3.5 1.6 - 8.3 10e3/uL    Absolute Lymphocytes 1.8 0.8 - 5.3 10e3/uL    Absolute Monocytes 0.6 0.0 - 1.3 10e3/uL    Absolute Eosinophils 0.2 0.0 - 0.7 10e3/uL    Absolute Basophils 0.0 0.0 - 0.2 10e3/uL   CBC with platelets and differential     Status: None    Narrative    The following orders were created for panel order CBC with platelets and differential.  Procedure                               Abnormality         Status                     ---------                               -----------         ------                     CBC with platelets and d...[[id number]]                      Final result                 Please view results for these tests on the individual orders.       ASSESSMENT/PLAN:   Rashaun was seen today for physical, anxiety and hypertension.    Diagnoses and all orders for this visit:  Physical with abnormal findings     Benign essential hypertension  Labs today  Meeting goal continue lisinopril  -     CBC with platelets and differential; Future  -     Comprehensive metabolic panel (BMP + Alb, Alk Phos, ALT, AST, Total. Bili, TP); Future  -     lisinopril (ZESTRIL) 40 MG tablet; TAKE  "ONE TABLET BY MOUTH ONCE DAILY      Diverticulitis  Stable   Meds for flare if recurrent symptoms  Discussed gold standard care being CT and then treat if needed.  Verbalizes understanding an is in agreement.  Due to COVID would like to avoid ED care as able.   -     ciprofloxacin (CIPRO) 500 MG tablet; Take 1 tablet (500 mg) by mouth 2 times daily  -     metroNIDAZOLE (FLAGYL) 500 MG tablet; Take 1 tablet (500 mg) by mouth 2 times daily    Morbid obesity (H)  -     Comprehensive Weight Management; Future  Referral generated. 60-90 g protein daily recommended  8 glasses of water daily do not drink fluids 1 hour before or after meal  Increase activity shoot for 60 minutes of activity a day to break a sweat above what you are doing now.       Situational anxiety  -     Adult Mental Health Referral; Future        Patient has been advised of split billing requirements and indicates understanding: Yes  COUNSELING:   Reviewed preventive health counseling, as reflected in patient instructions    Estimated body mass index is 44.48 kg/m  as calculated from the following:    Height as of this encounter: 1.956 m (6' 5.01\").    Weight as of this encounter: 170.2 kg (375 lb 3.2 oz).     Weight management plan: Patient referred to endocrine and/or weight management specialty Discussed healthy diet and exercise guidelines    He reports that he has never smoked. He has never used smokeless tobacco.        Call or return to the clinic with any worsening of symptoms or no resolution. Patient/Parent verbalized understanding and is in agreement. Medication side effects reviewed.   Current Outpatient Medications   Medication Sig Dispense Refill     acetaminophen (TYLENOL) 500 MG tablet Take 1,000 mg by mouth every 6 hours as needed for mild pain Taking 2 pills        ciprofloxacin (CIPRO) 500 MG tablet Take 1 tablet (500 mg) by mouth 2 times daily 28 tablet 0     cyclobenzaprine (FLEXERIL) 10 MG tablet Take 1 tablet (10 mg) by mouth 2 " times daily as needed for muscle spasms 30 tablet 1     lisinopril (ZESTRIL) 40 MG tablet TAKE ONE TABLET BY MOUTH ONCE DAILY 90 tablet 3     metroNIDAZOLE (FLAGYL) 500 MG tablet Take 1 tablet (500 mg) by mouth 2 times daily 14 tablet 0     Chart documentation with Dragon Voice recognition Software. Although reviewed after completion, some words and grammatical errors may remain.      JARETT Horton [facility]

## 2022-01-08 ASSESSMENT — ANXIETY QUESTIONNAIRES: GAD7 TOTAL SCORE: 4

## 2022-02-16 ENCOUNTER — OFFICE VISIT (OUTPATIENT)
Dept: FAMILY MEDICINE | Facility: CLINIC | Age: 47
End: 2022-02-16
Payer: COMMERCIAL

## 2022-02-16 VITALS
TEMPERATURE: 97 F | SYSTOLIC BLOOD PRESSURE: 120 MMHG | WEIGHT: 315 LBS | HEART RATE: 72 BPM | HEIGHT: 77 IN | DIASTOLIC BLOOD PRESSURE: 82 MMHG | BODY MASS INDEX: 37.19 KG/M2 | RESPIRATION RATE: 16 BRPM

## 2022-02-16 DIAGNOSIS — A08.4 VIRAL GASTROENTERITIS: Primary | ICD-10-CM

## 2022-02-16 PROCEDURE — 99213 OFFICE O/P EST LOW 20 MIN: CPT | Performed by: NURSE PRACTITIONER

## 2022-02-16 NOTE — LETTER
February 16, 2022      Rashaun Camara  [address] [place] [id number]        To Whom It May Concern:    Rashaun Camara was seen in our clinic. He missed work 2/16/22 due viral gastroenteritis.      Sincerely,        JARETT Burciaga CNP

## 2022-02-16 NOTE — PROGRESS NOTES
"  Assessment & Plan     Viral gastroenteritis  No acute distress.  Symptoms consistent with a viral gastroenteritis.  With bloating recommend not using Imodium and continuing with a bland diet.  Discussion on getting labs for further evaluation however Vladimir declined at this time, he will follow up if symptoms do not continue to improve or any worsening symptoms.     Return in about 1 week (around 2/23/2022), or if symptoms worsen or fail to improve.    JARETT Burciaga CNP  [facility]    Subjective   Vladimir is a 46 year old who presents for the following health issues     History of Present Illness     Reason for visit:  Pressure and abdominal pain (States had dirrhea and vomiting that started 2/13/2022 )  Symptom onset:  1-3 days ago  Symptoms include:  Not eating since yesterday. hx of diverticulitis   Symptom intensity:  Moderate  Symptom progression:  Staying the same  Had these symptoms before:  No  What makes it better:  Imodium helped with diarrhea     He eats 2-3 servings of fruits and vegetables daily.He consumes 0 sweetened beverage(s) daily.He exercises with enough effort to increase his heart rate 10 to 19 minutes per day.  He exercises with enough effort to increase his heart rate 3 or less days per week.   He is taking medications regularly.     Started out with son having symptoms then Vladimir developed symptoms  Afebrile   No vomiting or diarrhea since Imodium however bloating started then    Review of Systems   Constitutional, HEENT, cardiovascular, pulmonary, gi and gu systems are negative, except as otherwise noted.      Objective    /82 (Cuff Size: Adult Large)   Pulse 72   Temp 97  F (36.1  C) (Tympanic)   Resp 16   Ht 1.956 m (6' 5\")   Wt (!) 171.5 kg (378 lb)   BMI 44.82 kg/m    Body mass index is 44.82 kg/m .  Physical Exam   GENERAL: healthy, alert and no distress  NECK: no adenopathy  RESP: lungs clear to auscultation - no rales, rhonchi or " wheezes  CV: regular rate and rhythm, normal S1 S2, no S3 or S4, no murmur  ABDOMEN: tenderness epigastric and bowel sounds normal  PSYCH: mentation appears normal, affect normal/bright

## 2022-09-11 ENCOUNTER — HEALTH MAINTENANCE LETTER (OUTPATIENT)
Age: 47
End: 2022-09-11

## 2023-02-14 NOTE — PROGRESS NOTES
"   PHYSICAL THERAPY DISCHARGE NOTE  08/02/17 0800   Signing Clinician's Name / Credentials   Signing clinician's name / credentials Gloria Puckett, PT, DPT, OCS   Session Number   Session Number 4 [employer]   Progress Note/Recertification   Progress Note Due Date 08/30/17   Adult Goals   PT Ortho Eval Goals 1;2;3;4   Ortho Goal 1   Goal Identifier 1   Goal Description Patient will be able to walk 200 ft without a limp or pain, demonstrating normal gait pattern.    Target Date 08/09/17   Ortho Goal 2   Goal Identifier 2   Goal Description Patient will be able to ascend/descend stairs, no rail, demonstrating normal gait mechanics and minimal pain.   Target Date 08/30/17   Ortho Goal 3   Goal Identifier 3   Goal Description Patient will to walk 1 mile with minimal pain.   Target Date 08/30/17   Ortho Goal 4   Goal Identifier 4   Goal Description Patient will be able to return to work with pain 2/10 or less at end of day.   Target Date 08/30/17   Subjective Report   Subjective Report Patient reports no pain in the knee. Was sore after last visit, took it easy the next day.     Objective Measures   Objective Measures Objective Measure 1   Objective Measure 1   Objective Measure L Knee Extension ROM   Details 3 - 0 - 135   Treatment Interventions   Interventions Therapeutic Procedure/Exercise;Neuromuscular Re-education;Gait Training   Therapeutic Procedure/exercise   Minutes 25   Skilled Intervention HEP instruction, strength, flexibility to decrease pain and improve function   Patient Response quad fatigued by end, 25% cues for form   Treatment Detail instructed patient in performing SL hip abduction in slight extension x 10 B, supine knee extension stretch x 2 min, supine SLR slowly x 10 cues to keep knee straight, 4\" lateral step down x 10 cues to lightly tap R heel on floor, wall slides to 45-60* 10' holds cues to inc WS onto L LE (all quad exercises performed with Russian  stimulation on quad 10' on/10' off 2 channels " simultaneous)   Gait Training   Minutes 5   Skilled Intervention gait training   Patient Response improved gait pattern   Treatment Detail observation of gait with cues for heel-toe strike, full extension at heel strike, increased stride length. Pt ambulated in front of mirror for visual feedback as well. Backwards walking 20' x 4.   Education   Learner Patient   Readiness Eager   Method Booklet/handout;Explanation;Demonstration   Response Verbalizes Understanding;Demonstrates Understanding   Plan   Home program above ex   Plan for next session NMES, progress ex   Comments   Comments pt would like to return to work next week. pt to see Dr. Monroy this week for evaluation.    Total Session Time   Total Treatment Time (sum of timed and untimed services) 30, te2     Patient did not return for follow up treatments as directed.  Goal status and current objective information is therefore unknown.  The daily note from the patient's last visit will serve as the discharge note. Discharge from PT services at this time for this episode of treatment. Please see attached documentation under this episode of care for further information including dates of service, start of care date, referring physician, Dx, treatment plan, treatments, etc.    Please contact me with any questions or concerns.  Thank you for your referral.    Gloria Puckett, PT, DPT, OCS  Physical Therapist, Orthopedic Certified Specialist  [facility]  [phone]       Home

## 2023-05-06 ENCOUNTER — HEALTH MAINTENANCE LETTER (OUTPATIENT)
Age: 48
End: 2023-05-06

## 2023-07-21 ENCOUNTER — OFFICE VISIT (OUTPATIENT)
Dept: FAMILY MEDICINE | Facility: CLINIC | Age: 48
End: 2023-07-21
Payer: COMMERCIAL

## 2023-07-21 VITALS
OXYGEN SATURATION: 98 % | HEIGHT: 77 IN | RESPIRATION RATE: 16 BRPM | DIASTOLIC BLOOD PRESSURE: 86 MMHG | HEART RATE: 59 BPM | SYSTOLIC BLOOD PRESSURE: 126 MMHG | TEMPERATURE: 97 F | WEIGHT: 315 LBS | BODY MASS INDEX: 37.19 KG/M2

## 2023-07-21 DIAGNOSIS — E66.01 MORBID OBESITY (H): ICD-10-CM

## 2023-07-21 DIAGNOSIS — Z00.00 ROUTINE GENERAL MEDICAL EXAMINATION AT A HEALTH CARE FACILITY: Primary | ICD-10-CM

## 2023-07-21 DIAGNOSIS — I10 BENIGN ESSENTIAL HYPERTENSION: ICD-10-CM

## 2023-07-21 DIAGNOSIS — Z13.6 CARDIOVASCULAR SCREENING; LDL GOAL LESS THAN 130: ICD-10-CM

## 2023-07-21 PROBLEM — Z87.19 HISTORY OF DIVERTICULITIS: Status: ACTIVE | Noted: 2020-09-23

## 2023-07-21 PROBLEM — K57.92 DIVERTICULITIS: Status: RESOLVED | Noted: 2020-09-23 | Resolved: 2023-07-21

## 2023-07-21 PROBLEM — T81.41XA INFECTION OF SUPERFICIAL INCISIONAL SURGICAL SITE AFTER PROCEDURE, INITIAL ENCOUNTER: Status: RESOLVED | Noted: 2022-01-07 | Resolved: 2023-07-21

## 2023-07-21 LAB
ALBUMIN SERPL BCG-MCNC: 4 G/DL (ref 3.5–5.2)
ALP SERPL-CCNC: 70 U/L (ref 40–129)
ALT SERPL W P-5'-P-CCNC: 63 U/L (ref 0–70)
ANION GAP SERPL CALCULATED.3IONS-SCNC: 9 MMOL/L (ref 7–15)
AST SERPL W P-5'-P-CCNC: 31 U/L (ref 0–45)
BILIRUB SERPL-MCNC: 0.4 MG/DL
BUN SERPL-MCNC: 14 MG/DL (ref 6–20)
CALCIUM SERPL-MCNC: 9.3 MG/DL (ref 8.6–10)
CHLORIDE SERPL-SCNC: 105 MMOL/L (ref 98–107)
CHOLEST SERPL-MCNC: 176 MG/DL
CREAT SERPL-MCNC: 1 MG/DL (ref 0.67–1.17)
DEPRECATED HCO3 PLAS-SCNC: 26 MMOL/L (ref 22–29)
GFR SERPL CREATININE-BSD FRML MDRD: >90 ML/MIN/1.73M2
GLUCOSE SERPL-MCNC: 107 MG/DL (ref 70–99)
HBA1C MFR BLD: 5.3 % (ref 0–5.6)
HDLC SERPL-MCNC: 39 MG/DL
LDLC SERPL CALC-MCNC: 118 MG/DL
NONHDLC SERPL-MCNC: 137 MG/DL
POTASSIUM SERPL-SCNC: 4.8 MMOL/L (ref 3.4–5.3)
PROT SERPL-MCNC: 7 G/DL (ref 6.4–8.3)
SODIUM SERPL-SCNC: 140 MMOL/L (ref 136–145)
TRIGL SERPL-MCNC: 97 MG/DL

## 2023-07-21 PROCEDURE — 99213 OFFICE O/P EST LOW 20 MIN: CPT | Mod: 25 | Performed by: PHYSICIAN ASSISTANT

## 2023-07-21 PROCEDURE — 80053 COMPREHEN METABOLIC PANEL: CPT | Performed by: PHYSICIAN ASSISTANT

## 2023-07-21 PROCEDURE — 36415 COLL VENOUS BLD VENIPUNCTURE: CPT | Performed by: PHYSICIAN ASSISTANT

## 2023-07-21 PROCEDURE — 83036 HEMOGLOBIN GLYCOSYLATED A1C: CPT | Performed by: PHYSICIAN ASSISTANT

## 2023-07-21 PROCEDURE — 99396 PREV VISIT EST AGE 40-64: CPT | Performed by: PHYSICIAN ASSISTANT

## 2023-07-21 PROCEDURE — 80061 LIPID PANEL: CPT | Performed by: PHYSICIAN ASSISTANT

## 2023-07-21 RX ORDER — LISINOPRIL 40 MG/1
40 TABLET ORAL DAILY
Qty: 90 TABLET | Refills: 3 | Status: SHIPPED | OUTPATIENT
Start: 2023-07-21

## 2023-07-21 ASSESSMENT — ENCOUNTER SYMPTOMS
SORE THROAT: 0
DIZZINESS: 0
FEVER: 0
NAUSEA: 0
ARTHRALGIAS: 0
DIARRHEA: 0
DYSURIA: 0
JOINT SWELLING: 0
PARESTHESIAS: 0
HEADACHES: 0
NERVOUS/ANXIOUS: 0
CONSTIPATION: 0
HEMATURIA: 0
EYE PAIN: 0
COUGH: 0
HEMATOCHEZIA: 0
WEAKNESS: 0
PALPITATIONS: 0
FREQUENCY: 0
CHILLS: 0
HEARTBURN: 0
SHORTNESS OF BREATH: 0
ABDOMINAL PAIN: 0
MYALGIAS: 0

## 2023-07-21 ASSESSMENT — PAIN SCALES - GENERAL: PAINLEVEL: NO PAIN (0)

## 2023-07-21 NOTE — NURSING NOTE
"Chief Complaint   Patient presents with     Physical     BP (!) 126/90   Pulse 59   Temp 97  F (36.1  C) (Tympanic)   Resp 16   Ht 1.956 m (6' 5\")   Wt (!) 174.6 kg (385 lb)   SpO2 98%   BMI 45.65 kg/m   Estimated body mass index is 45.65 kg/m  as calculated from the following:    Height as of this encounter: 1.956 m (6' 5\").    Weight as of this encounter: 174.6 kg (385 lb).  Patient presents to the clinic using No DME      Health Maintenance that is potentially due pending provider review:    Health Maintenance Due   Topic Date Due     ANNUAL REVIEW OF HM ORDERS  Never done     HEPATITIS B IMMUNIZATION (1 of 3 - 3-dose series) Never done     COVID-19 Vaccine (1) Never done     HIV SCREENING  Never done     HEPATITIS C SCREENING  Never done     DTAP/TDAP/TD IMMUNIZATION (1 - Tdap) Never done     YEARLY PREVENTIVE VISIT  01/07/2023        n/a        "

## 2023-07-21 NOTE — PROGRESS NOTES
SUBJECTIVE:   CC: Vladimir is an 47 year old who presents for preventative health visit.       7/21/2023     6:58 AM   Additional Questions   Roomed by Alia WINSLOW   Accompanied by Self         7/21/2023     6:58 AM   Patient Reported Additional Medications   Patient reports taking the following new medications needs refill on lisinopril     Healthy Habits:     Getting at least 3 servings of Calcium per day:  NO    Bi-annual eye exam:  Yes    Dental care twice a year:  NO    Sleep apnea or symptoms of sleep apnea:  Sleep apnea    Diet:  Other    Frequency of exercise:  2-3 days/week    Duration of exercise:  15-30 minutes    Taking medications regularly:  Yes    Medication side effects:  None    Additional concerns today:  No    Today's PHQ-2 Score:       7/21/2023     6:52 AM   PHQ-2 ( 1999 Pfizer)   Q1: Little interest or pleasure in doing things 0   Q2: Feeling down, depressed or hopeless 0   PHQ-2 Score 0   Q1: Little interest or pleasure in doing things Not at all   Q2: Feeling down, depressed or hopeless Not at all   PHQ-2 Score 0     Hypertension Follow-up    Do you check your blood pressure regularly outside of the clinic? No     Are you following a low salt diet? No    Are your blood pressures ever more than 140 on the top number (systolic) OR more   than 90 on the bottom number (diastolic), for example 140/90?     Social History     Tobacco Use     Smoking status: Never     Smokeless tobacco: Never   Substance Use Topics     Alcohol use: Yes     Comment: 2-3 drinks per week-occ.             7/21/2023     6:51 AM   Alcohol Use   Prescreen: >3 drinks/day or >7 drinks/week? No       Last PSA: No results found for: PSA    Reviewed orders with patient. Reviewed health maintenance and updated orders accordingly - Yes    Reviewed and updated as needed this visit by clinical staff   Tobacco  Allergies  Meds  Problems  Med Hx  Surg Hx  Fam Hx          Reviewed and updated as needed this visit by Provider   Tobacco  " Allergies  Meds  Problems  Med Hx  Surg Hx  Fam Hx           Review of Systems   Constitutional: Negative for chills and fever.   HENT: Negative for congestion, ear pain, hearing loss and sore throat.    Eyes: Negative for pain and visual disturbance.   Respiratory: Negative for cough and shortness of breath.    Cardiovascular: Negative for chest pain, palpitations and peripheral edema.   Gastrointestinal: Negative for abdominal pain, constipation, diarrhea, heartburn, hematochezia and nausea.   Genitourinary: Negative for dysuria, frequency, genital sores, hematuria, impotence, penile discharge and urgency.   Musculoskeletal: Negative for arthralgias, joint swelling and myalgias.   Skin: Negative for rash.   Neurological: Negative for dizziness, weakness, headaches and paresthesias.   Psychiatric/Behavioral: Negative for mood changes. The patient is not nervous/anxious.      OBJECTIVE:   /86   Pulse 59   Temp 97  F (36.1  C) (Tympanic)   Resp 16   Ht 1.956 m (6' 5\")   Wt (!) 174.6 kg (385 lb)   SpO2 98%   BMI 45.65 kg/m      Physical Exam  GENERAL: healthy, alert and no distress  EYES: Eyes grossly normal to inspection, PERRL and conjunctivae and sclerae normal  HENT: nose and mouth without ulcers or lesions  NECK: no adenopathy, no asymmetry, masses, or scars and thyroid normal to palpation  RESP: lungs clear to auscultation - no rales, rhonchi or wheezes  CV: regular rate and rhythm, normal S1 S2, no S3 or S4, no murmur, click or rub, no peripheral edema and peripheral pulses strong  ABDOMEN: soft, nontender, no hepatosplenomegaly, no masses   MS: no gross musculoskeletal defects noted, no edema  SKIN: no suspicious lesions or rashes  NEURO: mentation intact and speech normal  PSYCH: mentation appears normal, affect normal/bright    ASSESSMENT/PLAN:   Routine general medical examination at a health care facility  47 yr old here for physical exam. Last physical exam done 1 year ago. Discussed " "preventative screenings which are updated below. Counseled on immunizations and healthy lifestyle. Follow-up in 1 year for repeat physical exam.   - REVIEW OF HEALTH MAINTENANCE PROTOCOL ORDERS    Benign essential hypertension  Well controlled on current regimen, 126/86 today. Encouraged to continue healthy diet, regular exercise, and weight loss.  - lisinopril (ZESTRIL) 40 MG tablet; Take 1 tablet (40 mg) by mouth daily  - Comprehensive metabolic panel; Future    Obesity (BMI 35.0-39.9) with comorbidity (H)  He is trying the keto diet and fasting for weight loss. Educated on risks and benefits of medical management. He is not ready to start it today. He will try to increase diet and exercise efforts over the next 6 months and follow up if he would like to try pharmacologic therapy in the future.  - Hemoglobin A1c; Future    CARDIOVASCULAR SCREENING; LDL GOAL LESS THAN 130  Due for screening.  - Lipid panel reflex to direct LDL Fasting; Future    Patient has been advised of split billing requirements and indicates understanding: Yes    COUNSELING:   Reviewed preventive health counseling, as reflected in patient instructions    BMI:   Estimated body mass index is 45.65 kg/m  as calculated from the following:    Height as of this encounter: 1.956 m (6' 5\").    Weight as of this encounter: 174.6 kg (385 lb).   Weight management plan: Discussed healthy diet and exercise guidelines    He reports that he has never smoked. He has never used smokeless tobacco.    CHIQUI Monge-Student    Physician Attestation   I, Tor Cordero PA-C, was present with the medical/HARPER student who participated in the service and in the documentation of the note.  I have verified the history and personally performed the physical exam and medical decision making.  I agree with the assessment and plan of care as documented in the note.      Tor Cordero PA-C     [facility]  "

## 2024-05-14 ENCOUNTER — OFFICE VISIT (OUTPATIENT)
Dept: FAMILY MEDICINE | Facility: CLINIC | Age: 49
End: 2024-05-14
Payer: COMMERCIAL

## 2024-05-14 VITALS
BODY MASS INDEX: 38.36 KG/M2 | RESPIRATION RATE: 24 BRPM | OXYGEN SATURATION: 94 % | WEIGHT: 315 LBS | HEIGHT: 76 IN | DIASTOLIC BLOOD PRESSURE: 88 MMHG | TEMPERATURE: 97.2 F | HEART RATE: 60 BPM | SYSTOLIC BLOOD PRESSURE: 132 MMHG

## 2024-05-14 DIAGNOSIS — M25.511 ACUTE PAIN OF RIGHT SHOULDER: Primary | ICD-10-CM

## 2024-05-14 DIAGNOSIS — M77.8 SHOULDER TENDINITIS, RIGHT: ICD-10-CM

## 2024-05-14 PROCEDURE — 99213 OFFICE O/P EST LOW 20 MIN: CPT | Performed by: FAMILY MEDICINE

## 2024-05-14 RX ORDER — PREDNISONE 20 MG/1
40 TABLET ORAL DAILY
Qty: 10 TABLET | Refills: 0 | Status: SHIPPED | OUTPATIENT
Start: 2024-05-14 | End: 2024-05-19

## 2024-05-14 RX ORDER — NAPROXEN 500 MG/1
500 TABLET ORAL 2 TIMES DAILY WITH MEALS
Qty: 10 TABLET | Refills: 0 | Status: SHIPPED | OUTPATIENT
Start: 2024-05-14 | End: 2024-05-19

## 2024-05-14 ASSESSMENT — PAIN SCALES - GENERAL: PAINLEVEL: SEVERE PAIN (6)

## 2024-05-14 NOTE — PATIENT INSTRUCTIONS
Start prednisone tomorrow morning and take for 5 days.  After that 5 days, start naproxen as prescribed. Take this with food.  Drink at least 8 full glasses of water a day to stay hydrated.    Avoid activities that increase pain for now.       I have reviewed and confirmed nurses' notes...

## 2024-05-14 NOTE — PROGRESS NOTES
Assessment & Plan     Acute pain of right shoulder  Atraumatic acute onset pain with LROM.  Most consistent with tendinitis vs strain. Less suspect for rotator cuff tear. May also have DJD.  Trial of oral steroid then oral naproxen to reduce inflammation and pain.   Advised exercises; printed in AVS.  Activity as tolerated.  Reassess if not better after 2 weeks. Consider imaging then.  - predniSONE (DELTASONE) 20 MG tablet  Dispense: 10 tablet; Refill: 0  - naproxen (NAPROSYN) 500 MG tablet  Dispense: 10 tablet; Refill: 0    Shoulder tendinitis, right  See above  - predniSONE (DELTASONE) 20 MG tablet  Dispense: 10 tablet; Refill: 0  - naproxen (NAPROSYN) 500 MG tablet  Dispense: 10 tablet; Refill: 0      Patient Instructions   Start prednisone tomorrow morning and take for 5 days.  After that 5 days, start naproxen as prescribed. Take this with food.  Drink at least 8 full glasses of water a day to stay hydrated.    Avoid activities that increase pain for now.      Ayana Gilbert is a 48 year old, presenting for the following health issues:  Joint Pain (X2 weeks, right, Shoulder, upper back and neck pain, NKI, )        5/14/2024    10:44 AM   Additional Questions   Roomed by Monalisa MAS MA   Accompanied by self         5/14/2024    10:44 AM   Patient Reported Additional Medications   Patient reports taking the following new medications Apple cider vinegar tablets unknown dose two tabs daily     History of Present Illness       Reason for visit:  Shoulder and joint pain  Symptom onset:  1-2 weeks ago  Symptom intensity:  Moderate  Symptom progression:  Staying the same  Had these symptoms before:  No    He eats 0-1 servings of fruits and vegetables daily.He consumes 0 sweetened beverage(s) daily.He exercises with enough effort to increase his heart rate 10 to 19 minutes per day.  He exercises with enough effort to increase his heart rate 3 or less days per week. He is missing 1 dose(s) of medications per week.    "    Musculoskeletal problem/pain    Duration: 2 weeks  Description  Location: right shoulder mainly; may involve upper back, neck spine and right shoulder blade  Intensity:  moderate, severe  Accompanying signs and symptoms: radiation of pain to lateral and anterior aspect of right upper arm, and limitation of movement due to pain  History  Previous similar problem: no   Previous evaluation:  none  Precipitating or alleviating factors:  Trauma or overuse: patient denies any trauma or repetitive use  Patient works installing liquid CO2 tanks.  Aggravating factors include: raising right upper extremity overhead; twisting shoulder outwards; try to scratch back  Therapies tried and outcome: Biofreeze - initially helpful but after a few hours makes it feel worse.         Review of Systems  Constitutional, neuro, ENT, endocrine, pulmonary, cardiac, gastrointestinal, genitourinary, musculoskeletal, integument and psychiatric systems are negative, except as otherwise noted.      Objective    /88 (BP Location: Right arm, Patient Position: Sitting, Cuff Size: Adult Large)   Pulse 60   Temp 97.2  F (36.2  C) (Tympanic)   Resp 24   Ht 1.937 m (6' 4.25\")   Wt (!) 177.5 kg (391 lb 6.4 oz)   SpO2 94%   BMI 47.33 kg/m    Body mass index is 47.33 kg/m .  Physical Exam   GENERAL: alert and no distress  SHOULDER Exam-Right   Inspection: no swelling, no bruising, no discoloration, no obvious deformity, no asymmetry, no glenohumeral joint anterior bulge, no distal clavicle elevation, no muscle atrophy, no scapular winging   Tenderness of: none   Range of Motion: Active- limited due to pain.  Range of Motion: Passive- limited due to pain., crepitations - none palpable   Strength: forward flexion- 5/5, painful, abduction- 5/5, painful, internal rotation- 5/5, painful, and external rotation- 5/5, painful   Special tests: Positive painful arc- POSITIVE, cross arm adduction- POSITIVE, and Scapular assistance test: improved pain  "   Opposite shoulder with no LROM or painful ROM     SKIN: no suspicious lesions, no rashes     No results found for any visits on 05/14/24.        Signed Electronically by: Orlando Mclain MD

## 2024-09-21 ENCOUNTER — HEALTH MAINTENANCE LETTER (OUTPATIENT)
Age: 49
End: 2024-09-21

## 2024-10-25 DIAGNOSIS — I10 BENIGN ESSENTIAL HYPERTENSION: ICD-10-CM

## 2024-10-25 RX ORDER — LISINOPRIL 40 MG/1
40 TABLET ORAL DAILY
Qty: 90 TABLET | Refills: 1 | OUTPATIENT
Start: 2024-10-25

## 2024-10-25 NOTE — TELEPHONE ENCOUNTER
Pending Prescriptions:                       Disp   Refills    lisinopril (ZESTRIL) 40 MG tablet [Pharmac*90 tab*1        Sig: TAKE 1 TABLET (40 MG) BY MOUTH DAILY    Routing refill request to provider for review/approval because:  Labs not current:  GFR, potassium    Potassium   Date Value Ref Range Status   07/21/2023 4.8 3.4 - 5.3 mmol/L Final   01/07/2022 4.3 3.4 - 5.3 mmol/L Final   09/30/2020 4.4 3.4 - 5.3 mmol/L Final     GFR Estimate   Date Value Ref Range Status   07/21/2023 >90 >60 mL/min/1.73m2 Final   09/30/2020 75 >60 mL/min/[1.73_m2] Final     Comment:     Non  GFR Calc  Starting 12/18/2018, serum creatinine based estimated GFR (eGFR) will be   calculated using the Chronic Kidney Disease Epidemiology Collaboration   (CKD-EPI) equation.       Ayaka Jones RN  [facility]

## 2024-10-28 RX ORDER — LISINOPRIL 40 MG/1
40 TABLET ORAL DAILY
Qty: 30 TABLET | Refills: 0 | Status: SHIPPED | OUTPATIENT
Start: 2024-10-28

## 2024-11-15 ENCOUNTER — OFFICE VISIT (OUTPATIENT)
Dept: FAMILY MEDICINE | Facility: CLINIC | Age: 49
End: 2024-11-15
Payer: COMMERCIAL

## 2024-11-15 VITALS
HEART RATE: 110 BPM | WEIGHT: 315 LBS | TEMPERATURE: 98.7 F | SYSTOLIC BLOOD PRESSURE: 147 MMHG | BODY MASS INDEX: 37.19 KG/M2 | OXYGEN SATURATION: 96 % | HEIGHT: 77 IN | RESPIRATION RATE: 16 BRPM | DIASTOLIC BLOOD PRESSURE: 90 MMHG

## 2024-11-15 DIAGNOSIS — M62.830 BACK MUSCLE SPASM: ICD-10-CM

## 2024-11-15 DIAGNOSIS — I10 BENIGN ESSENTIAL HYPERTENSION: Primary | ICD-10-CM

## 2024-11-15 DIAGNOSIS — Z00.00 ROUTINE GENERAL MEDICAL EXAMINATION AT A HEALTH CARE FACILITY: ICD-10-CM

## 2024-11-15 DIAGNOSIS — Z13.6 CARDIOVASCULAR SCREENING; LDL GOAL LESS THAN 130: ICD-10-CM

## 2024-11-15 PROBLEM — G47.33 OSA (OBSTRUCTIVE SLEEP APNEA): Status: ACTIVE | Noted: 2024-11-15

## 2024-11-15 LAB
ANION GAP SERPL CALCULATED.3IONS-SCNC: 11 MMOL/L (ref 7–15)
BUN SERPL-MCNC: 15.8 MG/DL (ref 6–20)
CALCIUM SERPL-MCNC: 9.2 MG/DL (ref 8.8–10.4)
CHLORIDE SERPL-SCNC: 103 MMOL/L (ref 98–107)
CHOLEST SERPL-MCNC: 181 MG/DL
CREAT SERPL-MCNC: 0.95 MG/DL (ref 0.67–1.17)
EGFRCR SERPLBLD CKD-EPI 2021: >90 ML/MIN/1.73M2
FASTING STATUS PATIENT QL REPORTED: NO
FASTING STATUS PATIENT QL REPORTED: NO
GLUCOSE SERPL-MCNC: 97 MG/DL (ref 70–99)
HCO3 SERPL-SCNC: 23 MMOL/L (ref 22–29)
HDLC SERPL-MCNC: 37 MG/DL
LDLC SERPL CALC-MCNC: 127 MG/DL
NONHDLC SERPL-MCNC: 144 MG/DL
POTASSIUM SERPL-SCNC: 4.3 MMOL/L (ref 3.4–5.3)
SODIUM SERPL-SCNC: 137 MMOL/L (ref 135–145)
TRIGL SERPL-MCNC: 83 MG/DL

## 2024-11-15 PROCEDURE — 80048 BASIC METABOLIC PNL TOTAL CA: CPT | Performed by: PHYSICIAN ASSISTANT

## 2024-11-15 PROCEDURE — 80061 LIPID PANEL: CPT | Performed by: PHYSICIAN ASSISTANT

## 2024-11-15 PROCEDURE — 36415 COLL VENOUS BLD VENIPUNCTURE: CPT | Performed by: PHYSICIAN ASSISTANT

## 2024-11-15 RX ORDER — LISINOPRIL 40 MG/1
40 TABLET ORAL DAILY
Qty: 90 TABLET | Refills: 3 | Status: SHIPPED | OUTPATIENT
Start: 2024-11-15

## 2024-11-15 RX ORDER — HYDROCHLOROTHIAZIDE 12.5 MG/1
12.5 TABLET ORAL DAILY
Qty: 90 TABLET | Refills: 3 | Status: SHIPPED | OUTPATIENT
Start: 2024-11-15

## 2024-11-15 RX ORDER — CYCLOBENZAPRINE HCL 10 MG
10 TABLET ORAL 2 TIMES DAILY PRN
Qty: 30 TABLET | Refills: 3 | Status: SHIPPED | OUTPATIENT
Start: 2024-11-15

## 2024-11-15 SDOH — HEALTH STABILITY: PHYSICAL HEALTH: ON AVERAGE, HOW MANY DAYS PER WEEK DO YOU ENGAGE IN MODERATE TO STRENUOUS EXERCISE (LIKE A BRISK WALK)?: 2 DAYS

## 2024-11-15 ASSESSMENT — SOCIAL DETERMINANTS OF HEALTH (SDOH): HOW OFTEN DO YOU GET TOGETHER WITH FRIENDS OR RELATIVES?: ONCE A WEEK

## 2024-11-15 ASSESSMENT — PAIN SCALES - GENERAL: PAINLEVEL_OUTOF10: NO PAIN (0)

## 2024-11-15 NOTE — PATIENT INSTRUCTIONS
Patient Education   Preventive Care Advice   This is general advice given by our system to help you stay healthy. However, your care team may have specific advice just for you. Please talk to your care team about your preventive care needs.  Nutrition  Eat 5 or more servings of fruits and vegetables each day.  Try wheat bread, brown rice and whole grain pasta (instead of white bread, rice, and pasta).  Get enough calcium and vitamin D. Check the label on foods and aim for 100% of the RDA (recommended daily allowance).  Lifestyle  Exercise at least 150 minutes each week  (30 minutes a day, 5 days a week).  Do muscle strengthening activities 2 days a week. These help control your weight and prevent disease.  No smoking.  Wear sunscreen to prevent skin cancer.  Have a dental exam and cleaning every 6 months.  Yearly exams  See your health care team every year to talk about:  Any changes in your health.  Any medicines your care team has prescribed.  Preventive care, family planning, and ways to prevent chronic diseases.  Shots (vaccines)   HPV shots (up to age 26), if you've never had them before.  Hepatitis B shots (up to age 59), if you've never had them before.  COVID-19 shot: Get this shot when it's due.  Flu shot: Get a flu shot every year.  Tetanus shot: Get a tetanus shot every 10 years.  Pneumococcal, hepatitis A, and RSV shots: Ask your care team if you need these based on your risk.  Shingles shot (for age 50 and up)  General health tests  Diabetes screening:  Starting at age 35, Get screened for diabetes at least every 3 years.  If you are younger than age 35, ask your care team if you should be screened for diabetes.  Cholesterol test: At age 39, start having a cholesterol test every 5 years, or more often if advised.  Bone density scan (DEXA): At age 50, ask your care team if you should have this scan for osteoporosis (brittle bones).  Hepatitis C: Get tested at least once in your life.  STIs (sexually  transmitted infections)  Before age 24: Ask your care team if you should be screened for STIs.  After age 24: Get screened for STIs if you're at risk. You are at risk for STIs (including HIV) if:  You are sexually active with more than one person.  You don't use condoms every time.  You or a partner was diagnosed with a sexually transmitted infection.  If you are at risk for HIV, ask about PrEP medicine to prevent HIV.  Get tested for HIV at least once in your life, whether you are at risk for HIV or not.  Cancer screening tests  Cervical cancer screening: If you have a cervix, begin getting regular cervical cancer screening tests starting at age 21.  Breast cancer scan (mammogram): If you've ever had breasts, begin having regular mammograms starting at age 40. This is a scan to check for breast cancer.  Colon cancer screening: It is important to start screening for colon cancer at age 45.  Have a colonoscopy test every 10 years (or more often if you're at risk) Or, ask your provider about stool tests like a FIT test every year or Cologuard test every 3 years.  To learn more about your testing options, visit:   .  For help making a decision, visit:   https://bit.ly/aj25542.  Prostate cancer screening test: If you have a prostate, ask your care team if a prostate cancer screening test (PSA) at age 55 is right for you.  Lung cancer screening: If you are a current or former smoker ages 50 to 80, ask your care team if ongoing lung cancer screenings are right for you.  For informational purposes only. Not to replace the advice of your health care provider. Copyright   2023 [employer]. All rights reserved. Clinically reviewed by the [facility] Transitions Program. [employer] [id number] - REV 01/24.

## 2024-11-15 NOTE — PROGRESS NOTES
"Preventive Care Visit  [facility]  Tor Cordero PA-C, Family Medicine  Nov 15, 2024      Assessment & Plan   Benign essential hypertension  Elevated here. He has been working on lifestyle changes, but will add hydrochlorothiazide to his regimen. Recheck BMP. Follow-up in 2-4 weeks with RN for BP check and lab.   - BASIC METABOLIC PANEL; Future  - lisinopril (ZESTRIL) 40 MG tablet; Take 1 tablet (40 mg) by mouth daily.  - hydroCHLOROthiazide 12.5 MG tablet; Take 1 tablet (12.5 mg) by mouth daily.    Back muscle spasm  Stable on current regimen. Refilled.   - cyclobenzaprine (FLEXERIL) 10 MG tablet; Take 1 tablet (10 mg) by mouth 2 times daily as needed for muscle spasms.    Routine general medical examination at a health care facility  49 yr old here for physical exam. Last physical exam done 1 year ago. Discussed preventative screenings which are updated below. Counseled on immunizations and healthy lifestyle. Follow-up in 1 year for repeat physical exam.     CARDIOVASCULAR SCREENING; LDL GOAL LESS THAN 130  Due for screening.   - Lipid panel reflex to direct LDL Fasting; Future    Patient has been advised of split billing requirements and indicates understanding: Yes      BMI  Estimated body mass index is 46.84 kg/m  as calculated from the following:    Height as of this encounter: 1.956 m (6' 5\").    Weight as of this encounter: 179.2 kg (395 lb).     Counseling  Appropriate preventive services were addressed with this patient via screening, questionnaire, or discussion as appropriate for fall prevention, nutrition, physical activity, Tobacco-use cessation, social engagement, weight loss and cognition.  Checklist reviewing preventive services available has been given to the patient.  Reviewed patient's diet, addressing concerns and/or questions.   He is at risk for lack of exercise and has been provided with information to increase physical activity for the benefit of his " well-being.   The patient was instructed to see the dentist every 6 months.     Ayana Gilbert is a 49 year old, presenting for the following:  Hypertension        11/15/2024    10:20 AM   Additional Questions   Roomed by nallely   Accompanied by self          HPI  Pt requesting refill for flexeril that he takes intermittently for back pain    - ongoing intermittent back pain for 15 years    - , does a lot of sitting leading to some lower leg swelling   - takes 1 tablet at night as needed, approx. 1 time every 3 months    - makes him tired, but only takes it at night    Pt presenting for BP check.    - Adherent to medication regimen   - Does not check at home    - No cough or dizziness   - Reports sleep apnea dx from approx 7 years ago. Uses CPAP every night.     Hypertension Follow-up    Do you check your blood pressure regularly outside of the clinic? No   Are you following a low salt diet? No  Are your blood pressures ever more than 140 on the top number (systolic) OR more   than 90 on the bottom number (diastolic), for example 140/90? Not  checking at home         11/15/2024   General Health   How would you rate your overall physical health? Good   Feel stress (tense, anxious, or unable to sleep) Not at all            11/15/2024   Nutrition   Three or more servings of calcium each day? (!) NO   Diet: Breakfast skipped    Other   If other, please elaborate: keto   How many servings of fruit and vegetables per day? (!) 2-3   How many sweetened beverages each day? 0-1       Multiple values from one day are sorted in reverse-chronological order         11/15/2024   Exercise   Days per week of moderate/strenous exercise 2 days      (!) EXERCISE CONCERN      11/15/2024   Social Factors   Frequency of gathering with friends or relatives Once a week   Worry food won't last until get money to buy more No   Food not last or not have enough money for food? No   Do you have housing? (Housing is defined as  stable permanent housing and does not include staying ouside in a car, in a tent, in an abandoned building, in an overnight shelter, or couch-surfing.) Yes   Are you worried about losing your housing? No   Lack of transportation? No   Unable to get utilities (heat,electricity)? No            11/15/2024   Dental   Dentist two times every year? (!) NO            11/15/2024   TB Screening   Were you born outside of the [place]? No              Today's PHQ-2 Score:       5/14/2024    10:35 AM   PHQ-2 ( 1999 Pfizer)   Q1: Little interest or pleasure in doing things 0    Q2: Feeling down, depressed or hopeless 0    PHQ-2 Score 0   Q1: Little interest or pleasure in doing things Not at all   Q2: Feeling down, depressed or hopeless Not at all   PHQ-2 Score 0       Patient-reported         11/15/2024   Substance Use   Alcohol more than 3/day or more than 7/wk No   Do you use any other substances recreationally? No        Social History     Tobacco Use    Smoking status: Never    Smokeless tobacco: Never   Vaping Use    Vaping status: Never Used   Substance Use Topics    Alcohol use: Yes     Comment: 2-3 drinks per week-occ.    Drug use: No             11/15/2024   One time HIV Screening   Previous HIV test? No          11/15/2024   STI Screening   New sexual partner(s) since last STI/HIV test? (!) DECLINE      ASCVD Risk   The 10-year ASCVD risk score (Abby LYNCH, et al., 2019) is: 4.2%    Values used to calculate the score:      Age: 49 years      Sex: Male      Is Non- : No      Diabetic: No      Tobacco smoker: No      Systolic Blood Pressure: 132 mmHg      Is BP treated: Yes      HDL Cholesterol: 39 mg/dL      Total Cholesterol: 176 mg/dL        11/15/2024   Contraception/Family Planning   Questions about contraception or family planning No           Reviewed and updated as needed this visit by Provider                      Review of Systems  See HPI      Objective    Exam  There were no vitals  "taken for this visit.   Estimated body mass index is 47.33 kg/m  as calculated from the following:    Height as of 5/14/24: 1.937 m (6' 4.25\").    Weight as of 5/14/24: 177.5 kg (391 lb 6.4 oz).    Physical Exam  Constitutional: healthy, alert, and no distress  Head: Normocephalic. Atraumatic  Eyes: No conjunctival injection, sclera anicteric  Neck: supple, no thyromegaly, nodules or asymmetry of the thyroid. No cervical LAD.  Cardiovascular: RRR. No murmurs, clicks, gallops, or rubs. No peripheral edema.   Respiratory: No resp distress. Lungs CTAB bilaterally.   Musculoskeletal: extremities normal- no gross deformities noted, and normal muscle tone  Skin: no suspicious lesions or rashes  Neurologic: Gait normal. CN 2-12 grossly intact  Psychiatric: mentation appears normal and affect normal/bright     Physician Attestation   I, Tor Cordero PA-C, was present with the medical/HARPER student who participated in the service and in the documentation of the note.  I have verified the history and personally performed the physical exam and medical decision making.  I agree with the assessment and plan of care as documented in the note.      Tor Cordero PA-C     "

## 2025-01-06 ENCOUNTER — OFFICE VISIT (OUTPATIENT)
Dept: FAMILY MEDICINE | Facility: CLINIC | Age: 50
End: 2025-01-06
Payer: COMMERCIAL

## 2025-01-06 VITALS
SYSTOLIC BLOOD PRESSURE: 134 MMHG | OXYGEN SATURATION: 96 % | DIASTOLIC BLOOD PRESSURE: 84 MMHG | RESPIRATION RATE: 16 BRPM | WEIGHT: 315 LBS | BODY MASS INDEX: 37.19 KG/M2 | HEIGHT: 77 IN | TEMPERATURE: 97.4 F | HEART RATE: 57 BPM

## 2025-01-06 DIAGNOSIS — I10 BENIGN ESSENTIAL HYPERTENSION: ICD-10-CM

## 2025-01-06 DIAGNOSIS — J01.00 ACUTE NON-RECURRENT MAXILLARY SINUSITIS: ICD-10-CM

## 2025-01-06 DIAGNOSIS — H10.33 ACUTE BACTERIAL CONJUNCTIVITIS OF BOTH EYES: Primary | ICD-10-CM

## 2025-01-06 DIAGNOSIS — K58.0 IRRITABLE BOWEL SYNDROME WITH DIARRHEA: ICD-10-CM

## 2025-01-06 PROCEDURE — 99213 OFFICE O/P EST LOW 20 MIN: CPT | Performed by: NURSE PRACTITIONER

## 2025-01-06 RX ORDER — DOXYCYCLINE 100 MG/1
100 CAPSULE ORAL 2 TIMES DAILY
Qty: 14 CAPSULE | Refills: 0 | Status: SHIPPED | OUTPATIENT
Start: 2025-01-06 | End: 2025-01-13

## 2025-01-06 RX ORDER — POLYMYXIN B SULFATE AND TRIMETHOPRIM 1; 10000 MG/ML; [USP'U]/ML
1-2 SOLUTION OPHTHALMIC EVERY 4 HOURS
Qty: 10 ML | Refills: 0 | Status: SHIPPED | OUTPATIENT
Start: 2025-01-06 | End: 2025-01-13

## 2025-01-06 ASSESSMENT — PAIN SCALES - GENERAL: PAINLEVEL_OUTOF10: MILD PAIN (2)

## 2025-01-06 ASSESSMENT — ENCOUNTER SYMPTOMS: EYE PAIN: 1

## 2025-01-06 NOTE — PROGRESS NOTES
"  Assessment & Plan     Acute bacterial conjunctivitis of both eyes  Start drops today, likely related to sinsuitis. Follow up if not improving.   - polymixin b-trimethoprim (POLYTRIM) 49041-1.1 UNIT/ML-% ophthalmic solution; Place 1-2 drops into both eyes every 4 hours for 7 days.    Acute non-recurrent maxillary sinusitis  Symptoms for >3 weeks with tenderness and new conjunctivitis. Discussed treatment for sinusitis. Continue with symptomatic management.   - doxycycline hyclate (VIBRAMYCIN) 100 MG capsule; Take 1 capsule (100 mg) by mouth 2 times daily for 7 days.    Irritable bowel syndrome with diarrhea  Symptoms present and bothersome impacting daily life. Has not yet seen GI recommended further evaluation to discuss management and treatment of symptoms. Referral placed.   - Adult GI  Referral - Consult Only; Future    Benign essential hypertension  Unable to tolerate the hydrochlorothiazide due to side effect of urination. With rest his BP did regulate. No new additional medications prescribed today however, discussed with patient that lifestyle changes will help with BP management as well. If persistently elevated consider amlodipine.       BMI  Estimated body mass index is 48.16 kg/m  as calculated from the following:    Height as of this encounter: 1.943 m (6' 4.5\").    Weight as of this encounter: 181.8 kg (400 lb 14.4 oz).           Ayana Gilbert is a 49 year old, presenting for the following health issues:  Eye Problem        1/6/2025    10:54 AM   Additional Questions   Roomed by Ashley     History of Present Illness       Reason for visit:  Eye issues  Symptom onset:  1-3 days ago   He is taking medications regularly.         Acute Illness  Acute illness concerns: Ongoing congestion  Onset/Duration: 2 weeks  Symptoms:  Fever: YES- over a week ago  Chills/Sweats: No  Headache (location?): No  Sinus Pressure: No  Conjunctivitis:  YES  Ear Pain: no  Rhinorrhea: No  Congestion: YES  Sore " "Throat: No  Cough: YES-non-productive  Wheeze: No  Decreased Appetite: No  Nausea: No  Vomiting: No  Diarrhea: No  Dysuria/Freq.: No  Dysuria or Hematuria: No  Fatigue/Achiness: YES- but resolved  Sick/Strep Exposure: No  Therapies tried and outcome: Nyquil, sudafed sinus, cough drops    Eye(s) Problem  Onset/Duration: Intermittent x2 weeks, worsening x1 week  Description:   Location: Bilateral - Left is much worse  Pain: YES  Redness: YES  Accompanying Signs & Symptoms:  Discharge/mattering: YES  Swelling: YES  Visual changes: YES  Fever: YES- over 1 week ago  Nasal Congestion: YES  Bothered by bright lights: YES  History:  Trauma: No  Foreign body exposure: No  Wearing contacts: No  Precipitating or alleviating factors: Head cold  Therapies tried and outcome: OTC eye drops a couple weeks ago    BP rechecked today in clinic, improved with rest. He is not tolerating his hydrochlorothiazide due to increased urination with the medication.     Mentions difficulties with IBS, has diarrhea after eating. Due to this he only eats 1 x daily at or around 4pm. This is when he is home (he is a ). He needs to be near a bathroom as he will have diarrhea following meals.       Review of Systems  Constitutional, HEENT, cardiovascular, pulmonary, gi and gu systems are negative, except as otherwise noted.      Objective    /84   Pulse 57   Temp 97.4  F (36.3  C) (Tympanic)   Resp 16   Ht 1.943 m (6' 4.5\")   Wt (!) 181.8 kg (400 lb 14.4 oz)   SpO2 96%   BMI 48.16 kg/m    Body mass index is 48.16 kg/m .    Physical Exam   GENERAL: alert and no distress  EYES: Eyes grossly normal to inspection and conjunctiva/corneas- conjunctival injection bilateral left worse than right and clear colored discharge present bilateral  HENT: ear canals and TM's normal, nose and mouth without ulcers or lesions  NECK: no adenopathy, no asymmetry, masses, or scars  RESP: lungs clear to auscultation - no rales, rhonchi or " wheezes  CV: regular rate and rhythm, normal S1 S2, no S3 or S4, no murmur, click or rub, no peripheral edema  MS: no gross musculoskeletal defects noted, no edema  PSYCH: mentation appears normal, affect normal/bright            Signed Electronically by: JARETT Eason CNP

## 2025-08-28 ENCOUNTER — NURSE TRIAGE (OUTPATIENT)
Dept: NURSING | Facility: CLINIC | Age: 50
End: 2025-08-28
Payer: COMMERCIAL

## (undated) DEVICE — SU VICRYL 0 CT-1 36" J946H

## (undated) DEVICE — ESU ENDO SCISSORS 5MM CVD 5DCS

## (undated) DEVICE — SU PROLENE 0 CT-1 30" 8424H

## (undated) DEVICE — BLADE KNIFE SURG 10 371110

## (undated) DEVICE — Device

## (undated) DEVICE — SOL NACL 0.9% IRRIG 1000ML BOTTLE 2F7124

## (undated) DEVICE — TUBING SUCTION 12"X1/4" N612

## (undated) DEVICE — DECANTER VIAL 2006S

## (undated) DEVICE — SOL NACL 0.9% IRRIG 1000ML BOTTLE 07138-09

## (undated) DEVICE — ADH SKIN CLOSURE PREMIERPRO EXOFIN 1.0ML 3470

## (undated) DEVICE — ENDO TROCAR FIRST ENTRY KII FIOS ADV FIX 05X100MM CFF03

## (undated) DEVICE — DRAPE POUCH INSTRUMENT 3 POCKET 1018L

## (undated) DEVICE — DEVICE SUTURE GRASPER TROCAR CLOSURE 14GAX15CM PMI-TC-SG

## (undated) DEVICE — SUCTION TIP YANKAUER STR K87

## (undated) DEVICE — ESU CORD MONOPOLAR 10'  E0510

## (undated) DEVICE — SU VICRYL 4-0 PS-2 18" UND J496G

## (undated) DEVICE — DRAPE IOBAN LG .375X23.5" 6648EZ

## (undated) DEVICE — SPONGE LAP 18X18" 1515

## (undated) DEVICE — PREP CHLORAPREP 26ML TINTED ORANGE  260815

## (undated) DEVICE — GLOVE PROTEXIS W/NEU-THERA 7.5  2D73TE75

## (undated) DEVICE — ESU PENCIL W/COATED BLADE E2450H

## (undated) DEVICE — DEVICE ABSORBABLE TACK 5MM SINGLE ABSTACK30

## (undated) DEVICE — DEVICE SUTURE PASSER 14GA WECK EFX EFXSP2

## (undated) DEVICE — BNDG ABDOMINAL BINDER 9X62-84" 79-89210

## (undated) DEVICE — SOL WATER IRRIG 1000ML BOTTLE 2F7114

## (undated) DEVICE — ENDO TROCAR FIRST ENTRY KII FIOS ADV FIX 11X100MM CFF33

## (undated) DEVICE — ENDO TROCAR SLEEVE KII ADV FIXATION 05X100MM CFS02

## (undated) DEVICE — GOWN XLG DISP 9545

## (undated) RX ORDER — LIDOCAINE HYDROCHLORIDE 10 MG/ML
INJECTION, SOLUTION EPIDURAL; INFILTRATION; INTRACAUDAL; PERINEURAL
Status: DISPENSED
Start: 2019-06-25

## (undated) RX ORDER — ONDANSETRON 2 MG/ML
INJECTION INTRAMUSCULAR; INTRAVENOUS
Status: DISPENSED
Start: 2019-06-25

## (undated) RX ORDER — HYDROXYZINE HYDROCHLORIDE 50 MG/1
TABLET, FILM COATED ORAL
Status: DISPENSED
Start: 2019-06-25

## (undated) RX ORDER — BUPIVACAINE HYDROCHLORIDE AND EPINEPHRINE 5; 5 MG/ML; UG/ML
INJECTION, SOLUTION EPIDURAL; INTRACAUDAL; PERINEURAL
Status: DISPENSED
Start: 2019-06-25

## (undated) RX ORDER — HYDROMORPHONE HYDROCHLORIDE 1 MG/ML
INJECTION, SOLUTION INTRAMUSCULAR; INTRAVENOUS; SUBCUTANEOUS
Status: DISPENSED
Start: 2019-06-25

## (undated) RX ORDER — GABAPENTIN 300 MG/1
CAPSULE ORAL
Status: DISPENSED
Start: 2019-06-25

## (undated) RX ORDER — FENTANYL CITRATE 50 UG/ML
INJECTION, SOLUTION INTRAMUSCULAR; INTRAVENOUS
Status: DISPENSED
Start: 2019-06-25

## (undated) RX ORDER — CELECOXIB 200 MG/1
CAPSULE ORAL
Status: DISPENSED
Start: 2019-06-25

## (undated) RX ORDER — DEXAMETHASONE SODIUM PHOSPHATE 4 MG/ML
INJECTION, SOLUTION INTRA-ARTICULAR; INTRALESIONAL; INTRAMUSCULAR; INTRAVENOUS; SOFT TISSUE
Status: DISPENSED
Start: 2019-06-25

## (undated) RX ORDER — PROPOFOL 10 MG/ML
INJECTION, EMULSION INTRAVENOUS
Status: DISPENSED
Start: 2019-06-25

## (undated) RX ORDER — ACETAMINOPHEN 325 MG/1
TABLET ORAL
Status: DISPENSED
Start: 2019-06-25